# Patient Record
Sex: MALE | Race: WHITE | Employment: OTHER | URBAN - METROPOLITAN AREA
[De-identification: names, ages, dates, MRNs, and addresses within clinical notes are randomized per-mention and may not be internally consistent; named-entity substitution may affect disease eponyms.]

---

## 2017-02-06 ENCOUNTER — OFFICE VISIT (OUTPATIENT)
Dept: CARDIOLOGY CLINIC | Age: 57
End: 2017-02-06

## 2017-02-06 VITALS
HEIGHT: 68 IN | DIASTOLIC BLOOD PRESSURE: 80 MMHG | HEART RATE: 64 BPM | BODY MASS INDEX: 36.59 KG/M2 | WEIGHT: 241.4 LBS | SYSTOLIC BLOOD PRESSURE: 110 MMHG

## 2017-02-06 DIAGNOSIS — I10 ESSENTIAL HYPERTENSION: ICD-10-CM

## 2017-02-06 DIAGNOSIS — I42.9 CARDIOMYOPATHY (HCC): Primary | ICD-10-CM

## 2017-02-06 DIAGNOSIS — R55 PRE-SYNCOPE: ICD-10-CM

## 2017-02-06 PROCEDURE — 99214 OFFICE O/P EST MOD 30 MIN: CPT | Performed by: INTERNAL MEDICINE

## 2017-03-28 ENCOUNTER — OFFICE VISIT (OUTPATIENT)
Dept: CARDIOLOGY CLINIC | Age: 57
End: 2017-03-28

## 2017-03-28 VITALS
HEART RATE: 56 BPM | WEIGHT: 242 LBS | SYSTOLIC BLOOD PRESSURE: 138 MMHG | DIASTOLIC BLOOD PRESSURE: 80 MMHG | BODY MASS INDEX: 36.8 KG/M2

## 2017-03-28 DIAGNOSIS — I10 ESSENTIAL HYPERTENSION: ICD-10-CM

## 2017-03-28 DIAGNOSIS — I42.9 CARDIOMYOPATHY (HCC): ICD-10-CM

## 2017-03-28 DIAGNOSIS — R07.9 CHEST PAIN, UNSPECIFIED TYPE: Primary | ICD-10-CM

## 2017-03-28 DIAGNOSIS — R55 PRE-SYNCOPE: ICD-10-CM

## 2017-03-28 PROCEDURE — 93000 ELECTROCARDIOGRAM COMPLETE: CPT | Performed by: INTERNAL MEDICINE

## 2017-03-28 PROCEDURE — 99214 OFFICE O/P EST MOD 30 MIN: CPT | Performed by: INTERNAL MEDICINE

## 2017-04-05 ENCOUNTER — OFFICE VISIT (OUTPATIENT)
Dept: ORTHOPEDIC SURGERY | Age: 57
End: 2017-04-05

## 2017-04-05 VITALS
DIASTOLIC BLOOD PRESSURE: 77 MMHG | HEIGHT: 68 IN | BODY MASS INDEX: 36.37 KG/M2 | SYSTOLIC BLOOD PRESSURE: 140 MMHG | HEART RATE: 53 BPM | WEIGHT: 240 LBS

## 2017-04-05 DIAGNOSIS — M25.512 LEFT SHOULDER PAIN, UNSPECIFIED CHRONICITY: Primary | ICD-10-CM

## 2017-04-05 DIAGNOSIS — M19.012 PRIMARY OSTEOARTHRITIS OF LEFT SHOULDER: ICD-10-CM

## 2017-04-05 PROCEDURE — 99214 OFFICE O/P EST MOD 30 MIN: CPT | Performed by: ORTHOPAEDIC SURGERY

## 2017-04-05 PROCEDURE — 73030 X-RAY EXAM OF SHOULDER: CPT | Performed by: ORTHOPAEDIC SURGERY

## 2017-04-14 DIAGNOSIS — M19.012 PRIMARY OSTEOARTHRITIS OF LEFT SHOULDER: Primary | ICD-10-CM

## 2017-05-10 ENCOUNTER — OFFICE VISIT (OUTPATIENT)
Dept: ORTHOPEDIC SURGERY | Age: 57
End: 2017-05-10

## 2017-05-10 VITALS
DIASTOLIC BLOOD PRESSURE: 60 MMHG | WEIGHT: 230 LBS | HEIGHT: 68 IN | BODY MASS INDEX: 34.86 KG/M2 | SYSTOLIC BLOOD PRESSURE: 101 MMHG | HEART RATE: 61 BPM

## 2017-05-10 DIAGNOSIS — M19.012 PRIMARY OSTEOARTHRITIS OF LEFT SHOULDER: Primary | ICD-10-CM

## 2017-05-10 PROCEDURE — 99213 OFFICE O/P EST LOW 20 MIN: CPT | Performed by: ORTHOPAEDIC SURGERY

## 2017-05-10 PROCEDURE — L3670 SO ACRO/CLAV CAN WEB PRE OTS: HCPCS | Performed by: ORTHOPAEDIC SURGERY

## 2017-05-12 ENCOUNTER — TELEPHONE (OUTPATIENT)
Dept: ORTHOPEDIC SURGERY | Age: 57
End: 2017-05-12

## 2017-05-22 ENCOUNTER — OFFICE VISIT (OUTPATIENT)
Dept: CARDIOLOGY CLINIC | Age: 57
End: 2017-05-22

## 2017-05-22 VITALS
HEART RATE: 58 BPM | WEIGHT: 232 LBS | DIASTOLIC BLOOD PRESSURE: 68 MMHG | SYSTOLIC BLOOD PRESSURE: 116 MMHG | BODY MASS INDEX: 35.28 KG/M2

## 2017-05-22 DIAGNOSIS — I42.9 CARDIOMYOPATHY (HCC): ICD-10-CM

## 2017-05-22 DIAGNOSIS — I10 ESSENTIAL HYPERTENSION: ICD-10-CM

## 2017-05-22 DIAGNOSIS — R07.9 CHEST PAIN, UNSPECIFIED TYPE: Primary | ICD-10-CM

## 2017-05-22 PROCEDURE — 99214 OFFICE O/P EST MOD 30 MIN: CPT | Performed by: INTERNAL MEDICINE

## 2017-05-30 DIAGNOSIS — I42.9 CARDIOMYOPATHY (HCC): ICD-10-CM

## 2017-06-01 RX ORDER — CARVEDILOL 25 MG/1
25 TABLET ORAL 2 TIMES DAILY WITH MEALS
Qty: 60 TABLET | Refills: 5 | Status: SHIPPED | OUTPATIENT
Start: 2017-06-01 | End: 2017-09-07 | Stop reason: SINTOL

## 2017-07-20 ENCOUNTER — TELEPHONE (OUTPATIENT)
Dept: CARDIOLOGY CLINIC | Age: 57
End: 2017-07-20

## 2017-07-20 DIAGNOSIS — I15.9 SECONDARY HYPERTENSION: ICD-10-CM

## 2017-07-20 DIAGNOSIS — I50.32 CHRONIC DIASTOLIC HF (HEART FAILURE) (HCC): Primary | ICD-10-CM

## 2017-07-20 DIAGNOSIS — I10 ESSENTIAL HYPERTENSION: ICD-10-CM

## 2017-07-20 DIAGNOSIS — I50.22 CHRONIC SYSTOLIC HF (HEART FAILURE) (HCC): ICD-10-CM

## 2017-07-20 RX ORDER — VALSARTAN AND HYDROCHLOROTHIAZIDE 160; 12.5 MG/1; MG/1
1 TABLET, FILM COATED ORAL 2 TIMES DAILY
Qty: 60 TABLET | Refills: 1 | Status: SHIPPED | OUTPATIENT
Start: 2017-07-20 | End: 2017-09-14 | Stop reason: SDUPTHER

## 2017-07-21 DIAGNOSIS — I15.9 SECONDARY HYPERTENSION: ICD-10-CM

## 2017-07-21 DIAGNOSIS — I50.32 CHRONIC DIASTOLIC HF (HEART FAILURE) (HCC): ICD-10-CM

## 2017-07-21 DIAGNOSIS — I50.22 CHRONIC SYSTOLIC HF (HEART FAILURE) (HCC): ICD-10-CM

## 2017-07-21 LAB
ANION GAP SERPL CALCULATED.3IONS-SCNC: 17 MMOL/L (ref 3–16)
BUN BLDV-MCNC: 51 MG/DL (ref 7–20)
CALCIUM SERPL-MCNC: 9.3 MG/DL (ref 8.3–10.6)
CHLORIDE BLD-SCNC: 96 MMOL/L (ref 99–110)
CO2: 23 MMOL/L (ref 21–32)
CREAT SERPL-MCNC: 1.6 MG/DL (ref 0.9–1.3)
GFR AFRICAN AMERICAN: 54
GFR NON-AFRICAN AMERICAN: 44
GLUCOSE BLD-MCNC: 139 MG/DL (ref 70–99)
POTASSIUM SERPL-SCNC: 4.4 MMOL/L (ref 3.5–5.1)
SODIUM BLD-SCNC: 136 MMOL/L (ref 136–145)

## 2017-07-24 ENCOUNTER — OFFICE VISIT (OUTPATIENT)
Dept: CARDIOLOGY CLINIC | Age: 57
End: 2017-07-24

## 2017-07-24 ENCOUNTER — TELEPHONE (OUTPATIENT)
Dept: CARDIOLOGY CLINIC | Age: 57
End: 2017-07-24

## 2017-07-24 VITALS
WEIGHT: 238.6 LBS | BODY MASS INDEX: 36.16 KG/M2 | HEART RATE: 62 BPM | DIASTOLIC BLOOD PRESSURE: 74 MMHG | SYSTOLIC BLOOD PRESSURE: 130 MMHG | HEIGHT: 68 IN

## 2017-07-24 DIAGNOSIS — I42.9 CARDIOMYOPATHY (HCC): Primary | ICD-10-CM

## 2017-07-24 DIAGNOSIS — I10 ESSENTIAL HYPERTENSION: ICD-10-CM

## 2017-07-24 DIAGNOSIS — I50.32 CHRONIC DIASTOLIC HEART FAILURE (HCC): ICD-10-CM

## 2017-07-24 DIAGNOSIS — I50.22 CHRONIC SYSTOLIC HEART FAILURE (HCC): ICD-10-CM

## 2017-07-24 DIAGNOSIS — R07.9 CHEST PAIN, UNSPECIFIED TYPE: ICD-10-CM

## 2017-07-24 DIAGNOSIS — I10 HYPERTENSION, BENIGN: Primary | ICD-10-CM

## 2017-07-24 DIAGNOSIS — I42.9 CARDIOMYOPATHY (HCC): ICD-10-CM

## 2017-07-24 PROCEDURE — 99214 OFFICE O/P EST MOD 30 MIN: CPT | Performed by: INTERNAL MEDICINE

## 2017-07-26 ENCOUNTER — TELEPHONE (OUTPATIENT)
Dept: CARDIOLOGY CLINIC | Age: 57
End: 2017-07-26

## 2017-07-28 ENCOUNTER — TELEPHONE (OUTPATIENT)
Dept: INTERNAL MEDICINE | Age: 57
End: 2017-07-28

## 2017-08-02 ENCOUNTER — TELEPHONE (OUTPATIENT)
Dept: ORTHOPEDIC SURGERY | Age: 57
End: 2017-08-02

## 2017-08-03 ENCOUNTER — OFFICE VISIT (OUTPATIENT)
Dept: CARDIOLOGY CLINIC | Age: 57
End: 2017-08-03

## 2017-08-03 VITALS
DIASTOLIC BLOOD PRESSURE: 60 MMHG | HEART RATE: 60 BPM | SYSTOLIC BLOOD PRESSURE: 100 MMHG | WEIGHT: 240 LBS | BODY MASS INDEX: 36.49 KG/M2

## 2017-08-03 DIAGNOSIS — I42.9 CARDIOMYOPATHY, UNSPECIFIED TYPE (HCC): Primary | ICD-10-CM

## 2017-08-03 DIAGNOSIS — I50.32 CHRONIC DIASTOLIC HEART FAILURE (HCC): ICD-10-CM

## 2017-08-03 DIAGNOSIS — I42.9 CARDIOMYOPATHY, UNSPECIFIED TYPE (HCC): ICD-10-CM

## 2017-08-03 DIAGNOSIS — I50.22 CHRONIC SYSTOLIC HEART FAILURE (HCC): ICD-10-CM

## 2017-08-03 DIAGNOSIS — I10 HYPERTENSION, BENIGN: ICD-10-CM

## 2017-08-03 DIAGNOSIS — I42.9 CARDIOMYOPATHY (HCC): ICD-10-CM

## 2017-08-03 LAB
ANION GAP SERPL CALCULATED.3IONS-SCNC: 15 MMOL/L (ref 3–16)
BUN BLDV-MCNC: 33 MG/DL (ref 7–20)
CALCIUM SERPL-MCNC: 9.2 MG/DL (ref 8.3–10.6)
CHLORIDE BLD-SCNC: 100 MMOL/L (ref 99–110)
CO2: 24 MMOL/L (ref 21–32)
CREAT SERPL-MCNC: 1.4 MG/DL (ref 0.9–1.3)
GFR AFRICAN AMERICAN: >60
GFR NON-AFRICAN AMERICAN: 52
GLUCOSE BLD-MCNC: 106 MG/DL (ref 70–99)
POTASSIUM SERPL-SCNC: 4.2 MMOL/L (ref 3.5–5.1)
SODIUM BLD-SCNC: 139 MMOL/L (ref 136–145)

## 2017-08-03 PROCEDURE — 93000 ELECTROCARDIOGRAM COMPLETE: CPT | Performed by: INTERNAL MEDICINE

## 2017-08-03 PROCEDURE — 99215 OFFICE O/P EST HI 40 MIN: CPT | Performed by: INTERNAL MEDICINE

## 2017-08-03 RX ORDER — ASPIRIN 81 MG/1
81 TABLET ORAL DAILY
Qty: 30 TABLET | Refills: 3 | Status: ON HOLD | OUTPATIENT
Start: 2017-08-03 | End: 2017-10-27 | Stop reason: HOSPADM

## 2017-08-03 RX ORDER — ATORVASTATIN CALCIUM 20 MG/1
20 TABLET, FILM COATED ORAL DAILY
Qty: 30 TABLET | Refills: 3 | Status: SHIPPED | OUTPATIENT
Start: 2017-08-03 | End: 2017-08-15

## 2017-08-04 ENCOUNTER — TELEPHONE (OUTPATIENT)
Dept: ORTHOPEDIC SURGERY | Age: 57
End: 2017-08-04

## 2017-08-15 ENCOUNTER — OFFICE VISIT (OUTPATIENT)
Dept: INTERNAL MEDICINE | Age: 57
End: 2017-08-15

## 2017-08-15 VITALS — BODY MASS INDEX: 36.04 KG/M2 | SYSTOLIC BLOOD PRESSURE: 126 MMHG | WEIGHT: 237 LBS | DIASTOLIC BLOOD PRESSURE: 70 MMHG

## 2017-08-15 DIAGNOSIS — I10 HYPERTENSION, BENIGN: Primary | ICD-10-CM

## 2017-08-15 DIAGNOSIS — I50.22 CHRONIC SYSTOLIC HEART FAILURE (HCC): ICD-10-CM

## 2017-08-15 DIAGNOSIS — Z12.5 PROSTATE CANCER SCREENING: ICD-10-CM

## 2017-08-15 DIAGNOSIS — Z11.4 SCREENING FOR HIV (HUMAN IMMUNODEFICIENCY VIRUS): ICD-10-CM

## 2017-08-15 DIAGNOSIS — N52.9 ERECTILE DYSFUNCTION, UNSPECIFIED ERECTILE DYSFUNCTION TYPE: ICD-10-CM

## 2017-08-15 DIAGNOSIS — E66.8 MODERATE OBESITY: ICD-10-CM

## 2017-08-15 DIAGNOSIS — N18.3 CKD (CHRONIC KIDNEY DISEASE), STAGE 3 (MODERATE): ICD-10-CM

## 2017-08-15 DIAGNOSIS — Z23 NEED FOR PNEUMOCOCCAL VACCINATION: ICD-10-CM

## 2017-08-15 DIAGNOSIS — Z11.59 NEED FOR HEPATITIS C SCREENING TEST: ICD-10-CM

## 2017-08-15 DIAGNOSIS — Z13.1 DIABETES MELLITUS SCREENING: ICD-10-CM

## 2017-08-15 LAB
BASOPHILS ABSOLUTE: 0.1 K/UL (ref 0–0.2)
BASOPHILS RELATIVE PERCENT: 0.9 %
CHOLESTEROL, TOTAL: 200 MG/DL (ref 0–199)
EOSINOPHILS ABSOLUTE: 0.5 K/UL (ref 0–0.6)
EOSINOPHILS RELATIVE PERCENT: 5.7 %
HCT VFR BLD CALC: 40.7 % (ref 40.5–52.5)
HDLC SERPL-MCNC: 33 MG/DL (ref 40–60)
HEMOGLOBIN: 14.2 G/DL (ref 13.5–17.5)
HEPATITIS C ANTIBODY INTERPRETATION: NORMAL
LDL CHOLESTEROL CALCULATED: 119 MG/DL
LYMPHOCYTES ABSOLUTE: 1.8 K/UL (ref 1–5.1)
LYMPHOCYTES RELATIVE PERCENT: 19.3 %
MCH RBC QN AUTO: 30.6 PG (ref 26–34)
MCHC RBC AUTO-ENTMCNC: 34.9 G/DL (ref 31–36)
MCV RBC AUTO: 87.8 FL (ref 80–100)
MONOCYTES ABSOLUTE: 0.6 K/UL (ref 0–1.3)
MONOCYTES RELATIVE PERCENT: 6.8 %
NEUTROPHILS ABSOLUTE: 6.1 K/UL (ref 1.7–7.7)
NEUTROPHILS RELATIVE PERCENT: 67.3 %
PDW BLD-RTO: 13.6 % (ref 12.4–15.4)
PLATELET # BLD: 135 K/UL (ref 135–450)
PMV BLD AUTO: 8.7 FL (ref 5–10.5)
PROSTATE SPECIFIC ANTIGEN: 0.36 NG/ML (ref 0–4)
RBC # BLD: 4.64 M/UL (ref 4.2–5.9)
TRIGL SERPL-MCNC: 241 MG/DL (ref 0–150)
TSH SERPL DL<=0.05 MIU/L-ACNC: 0.85 UIU/ML (ref 0.27–4.2)
VLDLC SERPL CALC-MCNC: 48 MG/DL
WBC # BLD: 9.1 K/UL (ref 4–11)

## 2017-08-15 PROCEDURE — 99214 OFFICE O/P EST MOD 30 MIN: CPT | Performed by: HOSPITALIST

## 2017-08-15 PROCEDURE — 90732 PPSV23 VACC 2 YRS+ SUBQ/IM: CPT | Performed by: HOSPITALIST

## 2017-08-15 PROCEDURE — 90471 IMMUNIZATION ADMIN: CPT | Performed by: HOSPITALIST

## 2017-08-15 RX ORDER — VITAMIN E 268 MG
400 CAPSULE ORAL DAILY
COMMUNITY

## 2017-08-15 RX ORDER — VITAMIN B COMPLEX
4 TABLET ORAL
COMMUNITY

## 2017-08-15 ASSESSMENT — PATIENT HEALTH QUESTIONNAIRE - PHQ9
2. FEELING DOWN, DEPRESSED OR HOPELESS: 1
SUM OF ALL RESPONSES TO PHQ9 QUESTIONS 1 & 2: 2
1. LITTLE INTEREST OR PLEASURE IN DOING THINGS: 1
SUM OF ALL RESPONSES TO PHQ QUESTIONS 1-9: 2

## 2017-08-16 ENCOUNTER — HOSPITAL ENCOUNTER (OUTPATIENT)
Dept: NUCLEAR MEDICINE | Age: 57
Discharge: HOME | End: 2017-08-16
Attending: INTERNAL MEDICINE | Admitting: INTERNAL MEDICINE

## 2017-08-16 DIAGNOSIS — I42.9 CARDIOMYOPATHY, UNSPECIFIED TYPE (HCC): ICD-10-CM

## 2017-08-16 DIAGNOSIS — I42.9 CARDIOMYOPATHY (HCC): ICD-10-CM

## 2017-08-16 LAB
ESTIMATED AVERAGE GLUCOSE: 131.2 MG/DL
HBA1C MFR BLD: 6.2 %
HIV-1 AND HIV-2 ANTIBODIES: NORMAL
LV EF: 47 %
LVEF MODALITY: NORMAL

## 2017-08-16 RX ADMIN — Medication 30 MILLICURIE: at 12:55

## 2017-08-31 ENCOUNTER — TELEPHONE (OUTPATIENT)
Dept: CARDIOLOGY CLINIC | Age: 57
End: 2017-08-31

## 2017-09-01 ENCOUNTER — TELEPHONE (OUTPATIENT)
Dept: INTERNAL MEDICINE | Age: 57
End: 2017-09-01

## 2017-09-07 RX ORDER — METOPROLOL TARTRATE 50 MG/1
50 TABLET, FILM COATED ORAL 2 TIMES DAILY
Qty: 60 TABLET | Refills: 3 | Status: SHIPPED | OUTPATIENT
Start: 2017-09-07 | End: 2018-02-19 | Stop reason: SDUPTHER

## 2017-09-14 DIAGNOSIS — I10 ESSENTIAL HYPERTENSION: ICD-10-CM

## 2017-09-15 RX ORDER — VALSARTAN AND HYDROCHLOROTHIAZIDE 160; 12.5 MG/1; MG/1
TABLET, FILM COATED ORAL
Qty: 60 TABLET | Refills: 6 | Status: SHIPPED | OUTPATIENT
Start: 2017-09-15 | End: 2018-02-20 | Stop reason: ALTCHOICE

## 2017-10-03 ENCOUNTER — TELEPHONE (OUTPATIENT)
Dept: ORTHOPEDIC SURGERY | Age: 57
End: 2017-10-03

## 2017-10-09 RX ORDER — SODIUM CHLORIDE, SODIUM LACTATE, POTASSIUM CHLORIDE, CALCIUM CHLORIDE 600; 310; 30; 20 MG/100ML; MG/100ML; MG/100ML; MG/100ML
INJECTION, SOLUTION INTRAVENOUS CONTINUOUS
Status: CANCELLED | OUTPATIENT
Start: 2017-10-09

## 2017-10-10 ENCOUNTER — HOSPITAL ENCOUNTER (OUTPATIENT)
Dept: PREADMISSION TESTING | Age: 57
Discharge: HOME | End: 2017-10-10
Attending: ORTHOPAEDIC SURGERY | Admitting: ORTHOPAEDIC SURGERY

## 2017-10-10 VITALS
TEMPERATURE: 98.3 F | RESPIRATION RATE: 16 BRPM | DIASTOLIC BLOOD PRESSURE: 70 MMHG | WEIGHT: 240 LBS | HEIGHT: 68 IN | HEART RATE: 50 BPM | BODY MASS INDEX: 36.37 KG/M2 | SYSTOLIC BLOOD PRESSURE: 126 MMHG | OXYGEN SATURATION: 99 %

## 2017-10-10 LAB
ABO/RH: NORMAL
ANION GAP SERPL CALCULATED.3IONS-SCNC: 13 MMOL/L (ref 3–16)
ANTIBODY SCREEN: NORMAL
APTT: 24.1 SEC (ref 24.1–34.9)
BASOPHILS ABSOLUTE: 0.1 K/UL (ref 0–0.2)
BASOPHILS RELATIVE PERCENT: 1 %
BILIRUBIN URINE: NEGATIVE
BLOOD, URINE: NEGATIVE
BUN BLDV-MCNC: 24 MG/DL (ref 7–20)
CALCIUM SERPL-MCNC: 9.3 MG/DL (ref 8.3–10.6)
CHLORIDE BLD-SCNC: 100 MMOL/L (ref 99–110)
CLARITY: CLEAR
CO2: 24 MMOL/L (ref 21–32)
COLOR: YELLOW
CREAT SERPL-MCNC: 1 MG/DL (ref 0.9–1.3)
EKG ATRIAL RATE: 47 BPM
EKG DIAGNOSIS: NORMAL
EKG P AXIS: 40 DEGREES
EKG P-R INTERVAL: 194 MS
EKG Q-T INTERVAL: 454 MS
EKG QRS DURATION: 96 MS
EKG QTC CALCULATION (BAZETT): 401 MS
EKG R AXIS: 28 DEGREES
EKG T AXIS: 1 DEGREES
EKG VENTRICULAR RATE: 47 BPM
EOSINOPHILS ABSOLUTE: 0.4 K/UL (ref 0–0.6)
EOSINOPHILS RELATIVE PERCENT: 4.5 %
GFR AFRICAN AMERICAN: >60
GFR NON-AFRICAN AMERICAN: >60
GLUCOSE BLD-MCNC: 105 MG/DL (ref 70–99)
GLUCOSE URINE: NEGATIVE MG/DL
HCT VFR BLD CALC: 40.7 % (ref 40.5–52.5)
HEMOGLOBIN: 14.3 G/DL (ref 13.5–17.5)
INR BLD: 0.98 (ref 0.85–1.15)
KETONES, URINE: NEGATIVE MG/DL
LEUKOCYTE ESTERASE, URINE: NEGATIVE
LYMPHOCYTES ABSOLUTE: 2 K/UL (ref 1–5.1)
LYMPHOCYTES RELATIVE PERCENT: 20.4 %
MCH RBC QN AUTO: 31.1 PG (ref 26–34)
MCHC RBC AUTO-ENTMCNC: 35.2 G/DL (ref 31–36)
MCV RBC AUTO: 88.5 FL (ref 80–100)
MICROSCOPIC EXAMINATION: NORMAL
MONOCYTES ABSOLUTE: 0.6 K/UL (ref 0–1.3)
MONOCYTES RELATIVE PERCENT: 6.8 %
NEUTROPHILS ABSOLUTE: 6.4 K/UL (ref 1.7–7.7)
NEUTROPHILS RELATIVE PERCENT: 67.3 %
NITRITE, URINE: NEGATIVE
PDW BLD-RTO: 13.3 % (ref 12.4–15.4)
PH UA: 5.5
PLATELET # BLD: 167 K/UL (ref 135–450)
PMV BLD AUTO: 8.3 FL (ref 5–10.5)
POTASSIUM SERPL-SCNC: 4.3 MMOL/L (ref 3.5–5.1)
PROTEIN UA: NEGATIVE MG/DL
PROTHROMBIN TIME: 11.1 SEC (ref 9.6–13)
RBC # BLD: 4.6 M/UL (ref 4.2–5.9)
SODIUM BLD-SCNC: 137 MMOL/L (ref 136–145)
SPECIFIC GRAVITY UA: 1.02
URINE TYPE: NORMAL
UROBILINOGEN, URINE: 0.2 E.U./DL
WBC # BLD: 9.6 K/UL (ref 4–11)

## 2017-10-10 NOTE — PROGRESS NOTES
Snoring? Do you snore loudly (loud enough to be heard through closed doors, or your bed partner elbows you for snoring at night)? Yes    Tired? Do you often feel tired, fatigued, or sleepy during the daytime (such as falling asleep during driving)? Yes    Observed? Has anyone observed you stop breathing or choking/gasping during your sleep? Yes    Pressure? Do you have or are being treated for high blood pressure? Yes    Neck Size? (measured around Conrados apple)  For male, is your shirt collar 17 inches or larger? For female, is your shirt collar 16 inches or larger? No    Age older than 48years old? Yes    Gender = Male  Yes    Body Mass Index more than 35 kg/m2?   Yes    Risk of VERN Scoring criteria:    [] Low risk:  Yes to 0  2 questions    [] Intermediate risk:  Yes to 3  4 questions    [x] High risk:  Yes to 5  8 questions

## 2017-10-10 NOTE — PROGRESS NOTES
(exception would be medication instructions below only)     5. MEDICATIONS    Take the following medications with a SMALL sip of water: Metoprolol   Use your usual dose of inhalers the morning of surgery. BRING your rescue inhaler with you to hospital.    Anesthesia does NOT want you to take insulin the morning of surgery. They will control your blood sugar while you are at the hospital. Please contact your ordering physician for instructions regarding your insulin the night before your procedure. If you have an insulin pump, please keep it set on basal rate. 6. Do not swallow water when brushing teeth. No gum, candy, mints or ice chips. Refrain from smoking or at least decrease the amount. 7. Dress in loose, comfortable clothing appropriate for redressing after your procedure. Do not wear jewelry (including body piercings), make-up (especially NO eye make-up), fingernail polish (NO toenail polish if foot/leg surgery), lotion, powders or metal hairclips. 8. Dentures, glasses, or contacts will need to be removed before your procedure. Bring cases for your glasses, contacts, dentures, or hearing aids to protect them while you are in surgery. 9. If you use a CPAP, please bring it with you on the day of your procedure. 10. We recommend that valuable personal  belongings, such as credit cards, cash, cell phones, e-tablets or jewelry, be left at home during your stay. The hospital will not be responsible for valuables that are not secured in the hospital safe. However, if your insurance requires a co-pay, you may want to bring a method of payment, i.e. check, cash, or credit card, if you wish to pay your co-pay the day of surgery. 11. If you are to stay overnight, you may bring a bag with personal items. Please have any large items you may need brought in by your family after your arrival to your hospital room.     12. If you have a Living Will or Durable Power of , please bring a copy on the day of your procedure. 15.  With your permission, one family member may accompany you while you are being prepared for surgery. Once you are ready, additional family members may join you. HOW WE KEEP YOU SAFE and WORK TO PREVENT SURGICAL SITE INFECTIONS:  1. Health care workers should always check your ID bracelet to verify your name and birth date. You will be asked many times to state your name, date of birth, and allergies. 2. Health care workers should always clean their hands with soap or alcohol gel before providing care to you. It is okay to ask anyone if they cleaned their hands before they touch you. 3. You will be actively involved in verifying the type of procedure you are having and ensuring the correct surgical site. This will be confirmed multiple times prior to your procedure. Do NOT mimi your surgery site UNLESS instructed to by your surgeon. 4. Do not shave or wax for 72 hours prior to procedure near your operative site. Shaving with a razor can irritate your skin and make it easier to develop an infection. On the day of your procedure, any hair that needs to be removed near the surgical site will be clipped by a healthcare worker using a special clippers designed to avoid skin irritation. 5. When you are in the operating room, your surgical site will be cleansed with a special soap, and in most cases, you will be given an antibiotic before the surgery begins. AFTER YOUR PROCEDURE:  1. For comfort and safety, arrange to have someone at home with you for the first 24 hours after discharge. 2. You and your family will be given written instructions about your diet, activity, dressing care, medications, and return visits. 3. Always clean your hands before and after caring for your wound. Do not let your family touch your surgery site without cleaning their hands. 4. Mild nausea, headache, muscle aches, sore throat, or fatigue may occur after anesthesia.  Should

## 2017-10-10 NOTE — PROGRESS NOTES
The following educational items and goals will be achieved upon completion of the patient's Pre-admission testing experience:             Identify the learner who is being assessed for education:  patient                    Ability to Learn:  Exhibits ability to grasp concepts and respond to questions: High  Ready to Learn: Yes  calm   Preferred Method of Learning:  verbal  Barriers to Learning: Verbalizes interest  Special Considerations due to cultural, Yazidism, spiritual beliefs:  No  Language:  English  :  Ciera Lind  [x] Appropriate evaluation / integration of data as delineated by ASPAN Standards of Perianesthesia Nursing Practice    Pain scale and pain management   [x]Patient verbalizes understanding of pain scale and pain management  [x]Pre-operative determination of patients anticipated Post-Operative pain goal:   4 of 10 on 10 point scale post op goal  [] Other     Medication(s) - Compliance with preop medication instructions  [x] Patient verbalizes understanding of preop medications (see [facility] Presurgical Instructions)    Instructions, Pre op                                                                                            [x] Patient verbalizes understanding of presurgical instructions as reviewed with phone interview nurse or in-person nurse review    Fall Risk Potential, Preoperatively                                                                                   [x]No preoperative risk identified  []Preop risk identified:                    []Sensory deficit        []Motor deficit        []Balance problem        []Home medication        []Uses assistive device                    []History of a Fall within the last 30 days    Goal(s) for fall prevention:  [x]Prevent fall or injury by requesting assistance with activities of daily living  [x]Patient / Significant other verbalizes understanding the need to call for assistance prior to getting out of bed during hospitalization      Infection Precautions                                                                                            [x] Patient understands implementation of Surgical Site Infection precautions (see [facility] Presurgical Instructions)     Patient Safety  [x] Patient identification verified  [x] Site verified    Instructions - Discharge Planning for Outpatients  [x] Patient / significant other voices understanding of home care and follow up procedures  [x] Encourage patient / significant other to review discharge instructions the day after procedure due to sedation on day of surgery    Anticipated Special Needs upon discharge:        [] Cooling device        [] Crutches       [] Catherne         [] Wound Support device        [] Drain        [] Other       Instructions - Discharge Planning for Admitted patients  [x] Patient / significant other understands plan for admission after surgery  [] Patient / significant other understands plan for anticipated discharge dispostion        10/10/2017 1:16 PM Denise Fontenot

## 2017-10-11 ENCOUNTER — OFFICE VISIT (OUTPATIENT)
Dept: CARDIOLOGY CLINIC | Age: 57
End: 2017-10-11

## 2017-10-11 ENCOUNTER — TELEPHONE (OUTPATIENT)
Dept: CARDIOLOGY CLINIC | Age: 57
End: 2017-10-11

## 2017-10-11 VITALS
HEIGHT: 68 IN | SYSTOLIC BLOOD PRESSURE: 120 MMHG | BODY MASS INDEX: 36.37 KG/M2 | WEIGHT: 240 LBS | RESPIRATION RATE: 12 BRPM | HEART RATE: 55 BPM | DIASTOLIC BLOOD PRESSURE: 72 MMHG | OXYGEN SATURATION: 98 %

## 2017-10-11 DIAGNOSIS — R07.9 CHEST PAIN, UNSPECIFIED TYPE: ICD-10-CM

## 2017-10-11 DIAGNOSIS — I42.0 DILATED CARDIOMYOPATHY (HCC): ICD-10-CM

## 2017-10-11 DIAGNOSIS — I10 ESSENTIAL HYPERTENSION: ICD-10-CM

## 2017-10-11 DIAGNOSIS — Z01.818 PRE-OP EVALUATION: Primary | ICD-10-CM

## 2017-10-11 LAB
ESTIMATED AVERAGE GLUCOSE: 137 MG/DL
HBA1C MFR BLD: 6.4 %
MRSA SCREEN RT-PCR: NORMAL
URINE CULTURE, ROUTINE: NORMAL

## 2017-10-11 PROCEDURE — 99214 OFFICE O/P EST MOD 30 MIN: CPT | Performed by: INTERNAL MEDICINE

## 2017-10-11 NOTE — PROGRESS NOTES
Subjective:      Patient ID: Nataliia Tee is a 59 y.o. male. HPI: Pt is being seen today for follow up on cardiomyopathy/HTN/pre syncope and pre op for shoulder surgery. Sx stable. No sob. No chest.   No pnd or orthopnea. No edema. No palp/tachycardia. No syncope. Past Medical History:   Diagnosis Date    Arthritis     [facility]estive heart failure) ([facility] [address]     Hypertension, benign     Kidney stone     Lipoma     removal righy shoulder    Other drug allergy     Psoriasis     pt states it is worse since being on Metoprolol    Thyroid disease     enlarged    Unspecified sleep apnea     no CPAP. Has not done a sleep study     Past Surgical History:   Procedure Laterality Date    HERNIA REPAIR      right     LIPOMA RESECTION      TONSILLECTOMY           Allergies   Allergen Reactions    Doxycycline Dermatitis    Iodides     Ketorolac Tromethamine Swelling    Oxycodone-Acetaminophen Hives    Percocet [Oxycodone-Acetaminophen]      Skin falls off hands    Clindamycin/Lincomycin Rash     \"sunburn rash\" and impending feeling of gloom        Social History     Social History    Marital status:      Spouse name: N/A    Number of children: N/A    Years of education: N/A     Occupational History    Not on file. Social History Main Topics    Smoking status: Never Smoker    Smokeless tobacco: Never Used    Alcohol use Yes      Comment: occ    Drug use: No    Sexual activity: Not on file     Other Topics Concern    Not on file     Social History Narrative    No narrative on file        Patient has a family history includes Other in his father and mother. Patient  has a past medical history of Arthritis; CHF (congestive heart failure) ([facility] [address] Hypertension, benign; Kidney stone; Lipoma; Other drug allergy; Psoriasis; Thyroid disease; and Unspecified sleep apnea.      Current Outpatient Prescriptions   Medication Sig Dispense Refill    Zinc Sulfate (ZINC 15 PO) Take by mouth  valsartan-hydrochlorothiazide (DIOVAN-HCT) 160-12.5 MG per tablet TAKE 1 TABLET BY MOUTH TWICE DAILY 60 tablet 6    metoprolol (LOPRESSOR) 50 MG tablet Take 1 tablet by mouth 2 times daily 60 tablet 3    vitamin E 400 UNIT capsule Take 400 Units by mouth daily      Coenzyme Q10 (COQ10) 100 MG CAPS Take 4 tablets by mouth       aspirin EC 81 MG EC tablet Take 1 tablet by mouth daily 30 tablet 3    Multiple Vitamins-Minerals (THERAPEUTIC MULTIVITAMIN-MINERALS) tablet Take 1 tablet by mouth daily      Glucosamine-Chondroit-Vit C-Mn (GLUCOSAMINE 1500 COMPLEX PO) Take by mouth      Omega-3 Fatty Acids (FISH OIL) 1200 MG CAPS Take by mouth 3 tablets once a week      B Complex Vitamins (VITAMIN-B COMPLEX) TABS Take by mouth      magnesium citrate solution Take 296 mLs by mouth once      vitamin D (CHOLECALCIFEROL) 400 UNIT TABS tablet Take 400 Units by mouth 2 times daily. No current facility-administered medications for this visit. Vitals  Weight: 240 lb (108.9 kg)  Blood Pressure: BP: (120)/(72)    Pulse: 55            Review of Systems   Constitutional: Negative for activity change and fatigue. Respiratory: Negative for apnea, cough, choking, chest tightness and shortness of breath. Cardiovascular: Negative for chest pain. Negative for palpitations and leg swelling. No PND or orthopnea. No tachycardia. Gastrointestinal: Negative for abdominal distention. Musculoskeletal: Negative for myalgias. Neurological: Negative for dizziness, syncope and light-headedness. Psychiatric/Behavioral: Negative for agitation, behavioral problems and confusion. Otherwise negative as done. Objective:   Physical Exam   Constitutional: He is oriented to person, place, and time. He appears well-developed and well-nourished. No distress. HENT:   Head: Normocephalic and atraumatic. Eyes: Conjunctivae and EOM are normal. Right eye exhibits no discharge. Left eye exhibits no discharge. Neck: Normal range of motion. Neck supple. No JVD present. Cardiovascular: Normal rate, regular rhythm, S1 normal, S2 normal and normal heart sounds. Exam reveals no gallop. No murmur heard. Pulses:       Radial pulses are 2+ on the right side, and 2+ on the left side. Pulmonary/Chest: Effort normal and breath sounds normal. No respiratory distress. He has no wheezes. He has no rales. Abdominal: Soft. Bowel sounds are normal. There is no tenderness. Musculoskeletal: Normal range of motion. He exhibits no edema. Neurological: He is alert and oriented to person, place, and time. Skin: Skin is warm and dry. Psychiatric: He has a normal mood and affect. His behavior is normal. Thought content normal.       Assessment:      1. Pre-op evaluation     2. Dilated cardiomyopathy ([address]     3. Essential hypertension     4. Chest pain, unspecified type             Plan:      CV stable. Compensated. No exertional sx . EKG shows SB otherwise normal. He is at increased but not prohibitive risk for shoulder surgery. Reviewed previous records and testing including myoview,echo 10/16 and cath 8/16. MUGA 8/17 shows EF 47%. Follow up 3 months.

## 2017-10-17 ENCOUNTER — OFFICE VISIT (OUTPATIENT)
Dept: INTERNAL MEDICINE | Age: 57
End: 2017-10-17

## 2017-10-17 VITALS
BODY MASS INDEX: 36.83 KG/M2 | HEIGHT: 68 IN | SYSTOLIC BLOOD PRESSURE: 118 MMHG | DIASTOLIC BLOOD PRESSURE: 70 MMHG | WEIGHT: 243 LBS

## 2017-10-17 DIAGNOSIS — I10 HYPERTENSION, BENIGN: ICD-10-CM

## 2017-10-17 DIAGNOSIS — E66.8 MODERATE OBESITY: ICD-10-CM

## 2017-10-17 DIAGNOSIS — I50.22 CHRONIC SYSTOLIC HEART FAILURE (HCC): ICD-10-CM

## 2017-10-17 DIAGNOSIS — G89.29 CHRONIC LEFT SHOULDER PAIN: ICD-10-CM

## 2017-10-17 DIAGNOSIS — Z12.11 COLON CANCER SCREENING: ICD-10-CM

## 2017-10-17 DIAGNOSIS — M25.512 CHRONIC LEFT SHOULDER PAIN: ICD-10-CM

## 2017-10-17 DIAGNOSIS — R73.03 PRE-DIABETES: ICD-10-CM

## 2017-10-17 DIAGNOSIS — Z01.818 PRE-OP EXAM: Primary | ICD-10-CM

## 2017-10-17 DIAGNOSIS — Z23 NEED FOR TDAP VACCINATION: ICD-10-CM

## 2017-10-17 PROCEDURE — 90471 IMMUNIZATION ADMIN: CPT | Performed by: HOSPITALIST

## 2017-10-17 PROCEDURE — 99214 OFFICE O/P EST MOD 30 MIN: CPT | Performed by: HOSPITALIST

## 2017-10-17 PROCEDURE — 90715 TDAP VACCINE 7 YRS/> IM: CPT | Performed by: HOSPITALIST

## 2017-10-17 ASSESSMENT — ENCOUNTER SYMPTOMS
NAUSEA: 1
EYES NEGATIVE: 1
COUGH: 1
HEARTBURN: 1

## 2017-10-17 NOTE — PROGRESS NOTES
Pre-Op Examination    Patient:  Zenon Shaw                                               : 1960  Age: 59 y.o. MRN: [id number]  Date : 10/17/2017    Referring Physician: Chino Maciel MD, orthopedic surgery    Procedure: Left shoulder surgery scheduled for 2017    HISTORY OF PRESENT ILLNESS:   The patient is a 59 y.o. male who presents for preoperative examination. Has long standing problem with Left shoulder joint; injured his shoulder about 12 years ago. Was evaluated by Dr Alecia Baugh; had several intra-articular corticosteroid injection with minimal symptom relief. Had MRI of the L shoulder on 17:  CONCLUSION:    1. Advanced osteoarthrosis of the glenohumeral joint with near circumferential tear of the    glenoid labrum, extensive spurring and remodeling. Posterior sagging of the humeral head. 2. A few, smaller 0.4cm in diameter free osteochondral bodies in the posterior joint space. 3. Mild tendinopathy of the conjoined tendon and teres minor. Undersurface fraying and    microtear of the subscapularis footprint. 4. Mild peritendinitis and mild tendinopathy of the biceps long head tendon. 5. Moderate hypertrophic arthrosis of the acromioclavicular joint. Reports 3-4/10 left shoulder pain. Planned anesthesia:  general  Known anesthesia problems: no  Bleeding risk:  no  Personal or FH of DVT/PE:  no  Patient objection to receiving blood products: no    HTN- controlled with current medfs    Cardiomyopathy- compensated; adheres to low sodium diet.     MUGA scan from 17:  Ejection fraction calculated at slightly below normal at 47 %    CKD stage 3 has improved; most recent Cr- 1.0    Pre-diabetes- most recent Hb A1c from 10/10/17- 6.4%    Past Medical History:        Diagnosis Date    Arthritis     CHF (congestive heart failure) ([address]     Hypertension, benign     Kidney stone     Lipoma     removal righy shoulder    Other drug allergy(995.27)     Psoriasis pt states it is worse since being on Metoprolol    Thyroid disease     enlarged    Unspecified sleep apnea     no CPAP. Has not done a sleep study       Past Surgical History:        Procedure Laterality Date    HERNIA REPAIR      right     LIPOMA RESECTION      TONSILLECTOMY         Family History:       Problem Relation Age of Onset    Other Mother      DJD    Other Father      idiopathic cardiomyopathy       Social History:   TOBACCO:   reports that he has never smoked. He has never used smokeless tobacco.  ETOH:   reports that he drinks alcohol. OCCUPATION:      Allergies:  Doxycycline; Iodides; Ketorolac tromethamine; Oxycodone-acetaminophen; Percocet [oxycodone-acetaminophen]; and Clindamycin/lincomycin    Current Medications:    Prior to Admission medications    Medication Sig Start Date End Date Taking? Authorizing Provider   Zinc Sulfate (ZINC 15 PO) Take by mouth   Yes Historical Provider, MD   valsartan-hydrochlorothiazide (DIOVAN-HCT) 160-12.5 MG per tablet TAKE 1 TABLET BY MOUTH TWICE DAILY 9/15/17  Yes Abraham Ramos MD   metoprolol (LOPRESSOR) 50 MG tablet Take 1 tablet by mouth 2 times daily 9/7/17  Yes Abraham Ramos MD   vitamin E 400 UNIT capsule Take 400 Units by mouth daily   Yes Historical Provider, MD   Coenzyme Q10 (COQ10) 100 MG CAPS Take 4 tablets by mouth    Yes Historical Provider, MD   Multiple Vitamins-Minerals (THERAPEUTIC MULTIVITAMIN-MINERALS) tablet Take 1 tablet by mouth daily   Yes Historical Provider, MD   Glucosamine-Chondroit-Vit C-Mn (GLUCOSAMINE 1500 COMPLEX PO) Take by mouth   Yes Historical Provider, MD   Omega-3 Fatty Acids (FISH OIL) 1200 MG CAPS Take by mouth 3 tablets once a week   Yes Historical Provider, MD   B Complex Vitamins (VITAMIN-B COMPLEX) TABS Take by mouth   Yes Historical Provider, MD   vitamin D (CHOLECALCIFEROL) 400 UNIT TABS tablet Take 400 Units by mouth 2 times daily.  8/18/10  Yes Historical Provider, MD   aspirin EC 81 MG EC tablet Take 1 He exhibits no distension and no mass. There is no tenderness. There is no rebound and no guarding. No hernia. Genitourinary: Penis normal. No penile tenderness. Genitourinary Comments: No testicular mass/tenderness to palpation   Musculoskeletal: He exhibits tenderness (Left shoulder). He exhibits no edema or deformity. Mildly reduced range of motion in the left shoulder joint   Lymphadenopathy:     He has no cervical adenopathy. Neurological: He is alert and oriented to person, place, and time. He displays normal reflexes. No sensory deficit. He exhibits normal muscle tone. Coordination normal.   Skin: Skin is warm. Capillary refill takes less than 2 seconds. No rash noted. He is not diaphoretic. No erythema. Psychiatric: He has a normal mood and affect. Thought content normal.   Vitals reviewed. EKG: Sinus mik; no acute ischemic changes     Assessment/Plan:   Misti Lopez was seen today for pre-op exam.    Diagnoses and all orders for this visit:    Pre-op exam    Hypertension, benign  - Low-sodium diet; regular judicious cardiovascular type exercise activity  - Continue current medications    Chronic systolic heart failure ([address]  - compensated; left ventricular ejection fraction has improved in the last 15 months  - continue current meds    Chronic left shoulder pain  - Tylenol as needed  - Will have surgery soon    Pre-diabetes  - Encouraged to adhere to carb controlled diet, continue regular cardiovascular type exercise program, and lose some weight    Moderate obesity  - as above    Colon cancer screening  -     POCT Fecal Immunochemical Test (FIT); Future    Need for Tdap vaccination  -     Tdap (age 10y-63y) IM ([facility])            Known risk factors for perioperative complications: no  No contraindications to planned surgery    1. Preoperative workup as follows: none idicated  2. Change in medication regimen before surgery: no  3.  Prophylaxis for cardiac events with perioperative beta-blockers:  Continue metoprolol 50 mg twice a day  4. Deep vein thrombosis prophylaxis:  Per orthopedic surgery.   Encouraged early ambulation    This form will be routed to Dr. Cherise Mireles M.D.   10/17/2017, 2:07 PM

## 2017-10-23 ENCOUNTER — TELEPHONE (OUTPATIENT)
Dept: INTERNAL MEDICINE | Age: 57
End: 2017-10-23

## 2017-10-23 NOTE — TELEPHONE ENCOUNTER
Call returned to patient's wife. Will need an appointment or see a picture.     Dermatologist will not see patient in hospital.

## 2017-10-25 ENCOUNTER — OFFICE VISIT (OUTPATIENT)
Dept: ORTHOPEDIC SURGERY | Age: 57
End: 2017-10-25

## 2017-10-25 VITALS
SYSTOLIC BLOOD PRESSURE: 124 MMHG | WEIGHT: 242.95 LBS | DIASTOLIC BLOOD PRESSURE: 73 MMHG | HEIGHT: 68 IN | BODY MASS INDEX: 36.82 KG/M2 | HEART RATE: 62 BPM

## 2017-10-25 DIAGNOSIS — M19.012 PRIMARY OSTEOARTHRITIS OF LEFT SHOULDER: Primary | ICD-10-CM

## 2017-10-25 PROCEDURE — 99213 OFFICE O/P EST LOW 20 MIN: CPT | Performed by: ORTHOPAEDIC SURGERY

## 2017-10-26 NOTE — PROGRESS NOTES
History of Present Illness:  [address] for preoperative teaching. He has advanced left shoulder primary osteoarthritis. I saw him recently back in May and we elected to proceed with shoulder replacement surgery later on in the fall. He is scheduled for surgery this coming Friday. He still has questions about my surgical plan. We have discussed resurfacing hemiarthroplasty versus total shoulder replacement. He mentions that he is still quite active and wants to continue working and doing physical work. 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 Medical History:  Patient's medications, allergies, past medical, surgical, social and family histories were reviewed and updated as appropriate. Pertinent items are noted in HPI  Review of systems reviewed from Patient History Form dated on May 10, 2017 and available in the patient's chart under the Media tab. Vital Signs:  Vitals:    10/25/17 1635   BP: 124/73   Pulse: 62     Constitutional: He is in no apparent distress. He appears his stated age of 59. Left Shoulder Examination:    Inspection:  The left shoulder is benign appearing. Palpation:  There is some crepitus and tenderness over the joint line. Active Range of Motion: Active forward elevation to 120°, external rotation at the side is to 20°. Internal rotation to the back to the lumbosacral junction. There is some end range discomfort. Passive Range of Motion:  Similar to his active range of motion. Strength:  Negative belly press test.  Good external rotation strength. Special Tests:  Distal neurovascular exam is intact. Comparison right shoulder examination:    Faint glenohumeral crepitus. Elevation is still preserved with good rhythm to 160°. Internal rotation to the back to L2. Good rotator cuff strength is noted. Radiology:     I reviewed plain radiographs obtained back in the spring. He has a large inferior humeral osteophyte.   The glenoid seems to be less involved. On the axillary lateral it appears to be an A2 glenoid. Assessment :  Advanced left shoulder primary osteoarthritis with concentric glenoid erosion. Given his activity level and relative youth we have discussed resurfacing hemiarthroplasty is a reasonable option. Obviously at surgery of the glenoid bone was soft or if there is significant wear that I have underappreciated then I will have no recourse but to convert to a stemmed anatomic total shoulder replacement. He accepts this possibility. Impression:  Encounter Diagnosis   Name Primary?  Primary osteoarthritis of left shoulder Yes       Office Procedures:  Orders Placed This Encounter   Procedures    DJO ultrasling IV Shoulder Sling     Patient was prescribed a DJO Ultrasling IV Shoulder Brace. The left shoulder will require stabilization / immobilization from this orthosis. The orthosis will assist in protecting the affected area, provide functional support and facilitate healing. The device was ordered and fit on 10/25/17. The patient was educated and fit by a healthcare professional with expert knowledge and specialization in brace application while under the direct supervision of the treating physician. Verbal and written instructions for the use of and application of this item were provided. They were instructed to contact the office immediately should the brace result in increased pain, decreased sensation, increased swelling or worsening of the condition. Treatment Plan:  Risks, benefits and potential complications of prosthetic shoulder arthroplasty surgery were discussed with the patient. We have discussed the possibility of resurfacing hemiarthroplasty versus stemmed anatomic total shoulder replacement.   Risks discussed include but are not limited to bleeding, infection, anesthetic risk, injury to nerves and blood vessels, deep vein thrombosis, residual stiffness and weakness, and the need

## 2017-10-27 ENCOUNTER — HOSPITAL ENCOUNTER (OUTPATIENT)
Dept: PHYSICAL THERAPY | Age: 57
Discharge: HOME | End: 2017-10-31
Attending: ORTHOPAEDIC SURGERY | Admitting: ORTHOPAEDIC SURGERY

## 2017-10-30 ENCOUNTER — HOSPITAL ENCOUNTER (OUTPATIENT)
Dept: PHYSICAL THERAPY | Age: 57
Discharge: HOME | End: 2017-10-30
Admitting: ORTHOPAEDIC SURGERY

## 2017-10-30 ENCOUNTER — TELEPHONE (OUTPATIENT)
Dept: ORTHOPEDIC SURGERY | Age: 57
End: 2017-10-30

## 2017-10-30 NOTE — FLOWSHEET NOTE
[facility]  Orthopaedics and Sports Rehabilitation, Daniel Duran       Physical Therapy Daily Treatment Note  Date:  10/30/2017    Patient Name:  Valerie Garcia    :  1960  MRN: [id number]  Restrictions/Precautions:    Medical/Treatment Diagnosis Information:  · Diagnosis: M19.012 Primary OA of L shoulder  //  S/P: L Shoulder Resurfacing Hemiarthroplasty, OBT 10/27/17  · Treatment Diagnosis: Pt presents post-operatively. Currently has significantly limited ROM in all directions, weakness of entire UE, swelling around shoulder and into forearm and hand, and decreased function. Insurance/Certification information:  PT Insurance Information: PT BENEFITS 2017 FACILITY/ CIGNA/ EFFECTIVE 16/ ACTIVE/ DED 1000 MET/ OOP 5850/ COPAY 50/ PAYS 80%/ 60 VPCY/ 0 AUTH/ 10-27-17 PAG  Physician Information:  Referring Practitioner: Gavin Smith  Plan of care signed (Y/N): pending    Date of Patient follow up with Physician: 17    G-Code (if applicable):      Date G-Code Applied:  10/30/17  PT G-Codes  Functional Assessment Tool Used: UEFI  Score: 47% deficit  Functional Limitation: Carrying, moving and handling objects  Carrying, Moving and Handling Objects Current Status (): At least 40 percent but less than 60 percent impaired, limited or restricted  Carrying, Moving and Handling Objects Goal Status ():  At least 1 percent but less than 20 percent impaired, limited or restricted    Progress Note: [x]  Yes  []  No  Next due by: Visit #10      Latex Allergy:  [x]NO      []YES  Preferred Language for Healthcare:   [x]English       []other:    Visit # Insurance Allowable   1 60 VPCY, 0 auth     Pain level:  0-3/10     SUBJECTIVE:  See eval    OBJECTIVE:   Deferred d/t PO status  ROM PROM AROM  Comment     L R L R     Flexion             Abduction             ER             IR             Other(cervical)             Other                 Deferred d/t PO status  Strength L R Comment   Flexion       Abduction         ER         IR         Supraspinatus         Upper Trap         Lower Trap         Mid Trap         Rhomboids         Biceps         Triceps         Horizontal Abduction         Horizontal Adduction         Lats            Deferred d/t PO status  Special Tests Results/Comment   Nolan Arredondo     OBriens     Other     Neurologic Signs     Functional Reach        Reflexes/Sensation:                         [x]Dermatomes/Myotomes intact                         [x]Reflexes equal and normal bilaterally                        []Other:     Joint mobility:                         []Normal                                   [x]Hypo                        []Hyper     Palpation: generalized TTP of shoulder     Functional Mobility/Transfers: Independent but altered to avoid use L arm     Posture: fair     Bandages/Dressings/Incisions: anterior incision, mesh covering, no active drainage, no sign of infection     Gait: (include devices/WB status):  WNL    RESTRICTIONS/PRECAUTIONS: surgical precautions; CHF, HTN    Exercises/Interventions:   Exercise/Equipment Resistance/Repetitions Other comments   Stretching/PROM     Wand     Table Slides 10 x 10\" Flexion   UE [place]     Pulleys     Pendulum     Elbow PROM 10 x 10\"         Isometrics     Retraction 3 x 10     30x    Weight shift     Flexion     Abduction     External Rotation     Internal Rotation     Biceps     Triceps          PRE's     Flexion     Abduction     External Rotation     Internal Rotation     Shrugs 3 x 10 0#   EXT     Reverse Flys     Serratus     Horizontal Abd with ER     Biceps     Triceps     Retraction          Cable Column/Theraband     External Rotation     Internal Rotation     Shrugs     Lats     Ext     Flex     Scapular Retraction     BIC     TRIC     PNF          Dynamic Stability          Plyoback          Manual interventions                     Therapeutic Exercise and NMR EXR  [x] (09091) Provided

## 2017-10-30 NOTE — PLAN OF CARE
wearing away. Was mildly better through the summer but he knew something needed to be done to fix this issue. Was limited with end-range elevation, reaching behind back or out to the side. Mostly was painful but would hit a point where he couldn't go any further. Pt had surgery on 10/27/17 for resurfacing hemiarthroplasty and OB Tenodesis. Doing okay since then. Has taken pain meds periodically to avoid pain increase and to make it more tolerable. Adjusted brace, which helped with pain. Has taken sling off once each day to change clothes without any issues. Sees Dr. Fay Crabtree in 2 days for post-op visit. Relevant Medical History: R shoulder lipoma removal  Functional Disability Index:PT G-Codes  Functional Assessment Tool Used: UEFI  Score: 47% deficit  Functional Limitation: Carrying, moving and handling objects  Carrying, Moving and Handling Objects Current Status (): At least 40 percent but less than 60 percent impaired, limited or restricted  Carrying, Moving and Handling Objects Goal Status (): At least 1 percent but less than 20 percent impaired, limited or restricted    Pain Scale: 0-3/10, aching  Easing factors:  Having sling on properly, medication  Provocative factors: accidentally moving or bumping arm      Type: []Constant   [x]Intermittent  []Radiating [x]Localized []other:     Numbness/Tingling: none - initially present in thumb and index finger but has subsided    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 Enjoys riding motorcycle, playing with grandchild, softball, lifting weights. Living Status/Prior Level of Function: Independent with ADLs and IADLs, household activities, yard work, work tasks, light recreation. Had to modify activity for the last 10-12 years but was unrestricted prior to initial injury.      OBJECTIVE:   Deferred d/t PO status  ROM PROM AROM  Comment    L R L R    Flexion

## 2017-10-30 NOTE — TELEPHONE ENCOUNTER
Called and spoke with patient's wife. Patient is doing very well with respect to pain and is taking the pain meds as needed. He went to PT today and will follow up for his post op on Wednesday. He has been feeling hot and cold at times. I advised that it is normal to run a low grade fever after surgery but to call us if his temp is concerning. He is also having issues with constipation from the pain meds. I advised a stool softener over the counter.

## 2017-11-01 ENCOUNTER — HOSPITAL ENCOUNTER (OUTPATIENT)
Dept: PHYSICAL THERAPY | Age: 57
Discharge: HOME | End: 2017-11-30
Attending: ORTHOPAEDIC SURGERY | Admitting: ORTHOPAEDIC SURGERY

## 2017-11-01 ENCOUNTER — OFFICE VISIT (OUTPATIENT)
Dept: ORTHOPEDIC SURGERY | Age: 57
End: 2017-11-01

## 2017-11-01 VITALS
SYSTOLIC BLOOD PRESSURE: 126 MMHG | WEIGHT: 235 LBS | DIASTOLIC BLOOD PRESSURE: 68 MMHG | HEIGHT: 68 IN | BODY MASS INDEX: 35.61 KG/M2 | HEART RATE: 53 BPM

## 2017-11-01 DIAGNOSIS — Z96.612 S/P SHOULDER HEMIARTHROPLASTY, LEFT: Primary | ICD-10-CM

## 2017-11-01 DIAGNOSIS — M25.512 LEFT SHOULDER PAIN, UNSPECIFIED CHRONICITY: ICD-10-CM

## 2017-11-01 LAB
ALBUMIN SERPL-MCNC: 4 G/DL (ref 3.4–5)
ANION GAP SERPL CALCULATED.3IONS-SCNC: 14 MMOL/L (ref 3–16)
BUN BLDV-MCNC: 20 MG/DL (ref 7–20)
CALCIUM SERPL-MCNC: 8.9 MG/DL (ref 8.3–10.6)
CHLORIDE BLD-SCNC: 95 MMOL/L (ref 99–110)
CO2: 28 MMOL/L (ref 21–32)
CREAT SERPL-MCNC: 0.9 MG/DL (ref 0.9–1.3)
GFR AFRICAN AMERICAN: >60
GFR NON-AFRICAN AMERICAN: >60
GLUCOSE BLD-MCNC: 111 MG/DL (ref 70–99)
PHOSPHORUS: 3.6 MG/DL (ref 2.5–4.9)
POTASSIUM SERPL-SCNC: 4.1 MMOL/L (ref 3.5–5.1)
SODIUM BLD-SCNC: 137 MMOL/L (ref 136–145)

## 2017-11-01 PROCEDURE — 99024 POSTOP FOLLOW-UP VISIT: CPT | Performed by: ORTHOPAEDIC SURGERY

## 2017-11-01 PROCEDURE — 73030 X-RAY EXAM OF SHOULDER: CPT | Performed by: ORTHOPAEDIC SURGERY

## 2017-11-06 ENCOUNTER — HOSPITAL ENCOUNTER (OUTPATIENT)
Dept: PHYSICAL THERAPY | Age: 57
Discharge: HOME | End: 2017-11-06
Admitting: ORTHOPAEDIC SURGERY

## 2017-11-06 NOTE — FLOWSHEET NOTE
[facility]  Orthopaedics and Sports Rehabilitation, [facility]       Physical Therapy Daily Treatment Note  Date:  2017    Patient Name:  Nataliia Tee    :  1960  MRN: [id number]  Restrictions/Precautions:    Medical/Treatment Diagnosis Information:  · Diagnosis: M19.012 Primary OA of L shoulder  //  S/P: L Shoulder Resurfacing Hemiarthroplasty, OBR 10/27/17  · Treatment Diagnosis: Pt presents post-operatively. Currently has significantly limited ROM in all directions, weakness of entire UE, swelling around shoulder and into forearm and hand, and decreased function. Insurance/Certification information:  PT Insurance Information: PT BENEFITS 2017 FACILITY/ CIGNA/ EFFECTIVE 16/ ACTIVE/ DED 1000 MET/ OOP 5850/ COPAY 50/ PAYS 80%/ 60 VPCY/ 0 AUTH/ 10-27-17 PAG  Physician Information:  Referring Practitioner: Meka Baig  Plan of care signed (Y/N): pending    Date of Patient follow up with Physician: 17    G-Code (if applicable):      Date G-Code Applied:  10/30/17       Progress Note: [x]  Yes  []  No  Next due by: Visit #10      Latex Allergy:  [x]NO      []YES  Preferred Language for Healthcare:   [x]English       []other:    Visit # Insurance Allowable   2 60 VPCY, 0 auth     Pain level:  0-3/10     SUBJECTIVE:  Pt is doing well with stated decreased pain from IE. Reports compliance with HEP without any c/o pain or discomfort. Notes he has increased the motion CPM by himself as he wasn't feeling a stretch. Would like to know if he can help a friend with work.       OBJECTIVE:   Deferred d/t PO status  ROM PROM AROM  Comment     L R L R     Flexion  90           Abduction             ER  0           IR             Other(cervical)             Other                 Deferred d/t PO status  Strength L R Comment   Flexion         Abduction         ER         IR         Supraspinatus         Upper Trap         Lower Trap         Mid Trap         Rhomboids         Biceps       and UE control with self care, reaching, carrying, lifting, house/yardwork, driving/computer work. [x] (69209) Provided verbal/tactile cueing for activities related to improving balance, coordination, kinesthetic sense, posture, motor skill, proprioception  to assist with  scapular, scapulothoracic and UE control with self care, reaching, carrying, lifting, house/yardwork, driving/computer work. Therapeutic Activities:    [x] (96996 or 26153) Provided verbal/tactile cueing for activities related to improving balance, coordination, kinesthetic sense, posture, motor skill, proprioception and motor activation to allow for proper function of scapular, scapulothoracic and UE control with self care, carrying, lifting, driving/computer work.      Home Exercise Program:    [x] (73714) Reviewed/Progressed HEP activities related to strengthening, flexibility, endurance, ROM of scapular, scapulothoracic and UE control with self care, reaching, carrying, lifting, house/yardwork, driving/computer work  [] (34092) Reviewed/Progressed HEP activities related to improving balance, coordination, kinesthetic sense, posture, motor skill, proprioception of scapular, scapulothoracic and UE control with self care, reaching, carrying, lifting, house/yardwork, driving/computer work      Manual Treatments:  PROM / STM / Oscillations-Mobs:  G-I, II, III, IV (PA's, Inf., Post.)  [] (41665) Provided manual therapy to mobilize soft tissue/joints of cervical/CT, scapular GHJ and UE for the purpose of modulating pain, promoting relaxation,  increasing ROM, reducing/eliminating soft tissue swelling/inflammation/restriction, improving soft tissue extensibility and allowing for proper ROM for normal function with self care, reaching, carrying, lifting, house/yardwork, driving/computer work    Modalities: declined    Charges:  Timed Code Treatment Minutes: 30'   Total Treatment Minutes: 39'       [] KRISS (LOW) 66137 (typically 20 minutes face-to-face)  [] EVAL (MOD) 71661 (typically 30 minutes face-to-face)  [] EVAL (HIGH) 27253 (typically 45 minutes face-to-face)  [] RE-EVAL     [x] NI(39816) x  2   [] IONTO  [] NMR (11414) x      [] VASO  [] Manual (72591) x       [] Other:  [x] TA x  1    [] Mech Traction (53157)  [] ES(attended) (15857)      [] ES (un) (43042):     GOALS:  Patient stated goal: Return to PLOF unrestricted.      Therapist goals for Patient:   Short Term Goals: To be achieved in: 2 weeks  1. Independent in HEP and progression per patient tolerance, in order to prevent re-injury. 2. Patient will have a decrease in pain to facilitate improvement in movement, function, and ADLs as indicated by Functional Deficits.     Long Term Goals: To be achieved in: 12 weeks  1. Disability index score of 30% or less for the UEFS to assist with reaching prior level of function. 2. Patient will demonstrate increased AROM to 150 deg flexion to allow for proper joint functioning as indicated by patients Functional Deficits. 3. Patient will demonstrate an increase in Strength to good scapular and core control  for UE to allow for proper functional mobility as indicated by patients Functional Deficits. 4. Patient will return to household functional activities without increased symptoms or restriction. 5. Pt will be able to reach behind back without restriction or symptoms. (patient specific functional goal)                         Progression Towards Functional goals:  [] Patient is progressing as expected towards functional goals listed. [] Progression is slowed due to complexities listed. [] Progression has been slowed due to co-morbidities. [x] Plan just implemented, too soon to assess goals progression  [] Other:     ASSESSMENT:  Pt tolerated treatment significantly well without any cues for technique and demonstrated a strong understanding of his HEP.   However, pt did require strong verbal cueing to not overdo exercises and stay

## 2017-11-10 ENCOUNTER — OFFICE VISIT (OUTPATIENT)
Dept: INTERNAL MEDICINE | Age: 57
End: 2017-11-10

## 2017-11-10 VITALS
WEIGHT: 243 LBS | BODY MASS INDEX: 36.83 KG/M2 | HEIGHT: 68 IN | SYSTOLIC BLOOD PRESSURE: 120 MMHG | DIASTOLIC BLOOD PRESSURE: 80 MMHG

## 2017-11-10 DIAGNOSIS — I10 HYPERTENSION, BENIGN: ICD-10-CM

## 2017-11-10 DIAGNOSIS — R23.4 PEELING SKIN: Primary | ICD-10-CM

## 2017-11-10 DIAGNOSIS — R21 SKIN RASH: ICD-10-CM

## 2017-11-10 LAB
ALBUMIN SERPL-MCNC: 3.9 G/DL (ref 3.4–5)
ANION GAP SERPL CALCULATED.3IONS-SCNC: 14 MMOL/L (ref 3–16)
BUN BLDV-MCNC: 19 MG/DL (ref 7–20)
CALCIUM SERPL-MCNC: 9.1 MG/DL (ref 8.3–10.6)
CHLORIDE BLD-SCNC: 100 MMOL/L (ref 99–110)
CO2: 27 MMOL/L (ref 21–32)
CREAT SERPL-MCNC: 0.9 MG/DL (ref 0.9–1.3)
GFR AFRICAN AMERICAN: >60
GFR NON-AFRICAN AMERICAN: >60
GLUCOSE BLD-MCNC: 130 MG/DL (ref 70–99)
PHOSPHORUS: 3 MG/DL (ref 2.5–4.9)
POTASSIUM SERPL-SCNC: 4.1 MMOL/L (ref 3.5–5.1)
SODIUM BLD-SCNC: 141 MMOL/L (ref 136–145)

## 2017-11-10 PROCEDURE — 99214 OFFICE O/P EST MOD 30 MIN: CPT | Performed by: HOSPITALIST

## 2017-11-10 RX ORDER — CLOBETASOL PROPIONATE 0.5 MG/G
OINTMENT TOPICAL
Qty: 60 G | Refills: 2 | Status: SHIPPED | OUTPATIENT
Start: 2017-11-10 | End: 2018-08-02 | Stop reason: SDUPTHER

## 2017-11-10 ASSESSMENT — PATIENT HEALTH QUESTIONNAIRE - PHQ9
1. LITTLE INTEREST OR PLEASURE IN DOING THINGS: 0
SUM OF ALL RESPONSES TO PHQ9 QUESTIONS 1 & 2: 0
SUM OF ALL RESPONSES TO PHQ QUESTIONS 1-9: 0
2. FEELING DOWN, DEPRESSED OR HOPELESS: 0

## 2017-11-10 NOTE — PROGRESS NOTES
Follow Up Visit Established Patient Visit    Patient:  Rebel Akins                                               : 1960  Age: 59 y.o. MRN: [id number]  Date : 11/10/2017    Rebel Akins is a 59 y.o. male who presents for :  Follow-up appointment. Chief Complaint   Patient presents with    Medication Reaction     hands peeling skin and burning,started after taking Earna Drop underwent left shoulder hemiarthroplasty on 10/28/17. Used Norco for several days after surgery for pain control. Reports skin peeling from his both hands. Had similar symptoms after using Percocet in the past.  No fever/chills. No nausea, no vomiting, no diarrhea. He reported episode of acute kidney injury in the past after use of Percocet. He also reports skin rash, possibly, secondary to psoriasis on his right shin and left knee areas. No chest pain. No cough. No dysuria/ hematuria. He doesn't report left shoulder pain. Doesn't check his blood pressure at home. Past Medical History:   Diagnosis Date    Arthritis     CHF (congestive heart failure) ([address]     [facility]     Hypertension, benign     Kidney stone     Lipoma     removal righy shoulder    Other drug allergy(995.27)     Psoriasis     pt states it is worse since being on Metoprolol    Thyroid disease     enlarged    Unspecified sleep apnea     no CPAP. Has not done a sleep study       Past Surgical History:   Procedure Laterality Date    HERNIA REPAIR      right     JOINT REPLACEMENT  10/27/2017    left shoulder    LIPOMA RESECTION      OTHER SURGICAL HISTORY Left 10/27/2017    LEFT SHOULDER RESURFACTING HEMIARTHROPLASTY, OPEN BICEPS    TONSILLECTOMY         Current Outpatient Prescriptions   Medication Sig Dispense Refill    clobetasol (TEMOVATE) 0.05 % ointment Apply topically 2 times daily.  60 g 2    aspirin 325 MG EC tablet Take 1 tablet by mouth 2 times daily 30 tablet 3    Zinc Sulfate (ZINC

## 2017-11-15 ENCOUNTER — OFFICE VISIT (OUTPATIENT)
Dept: ORTHOPEDIC SURGERY | Age: 57
End: 2017-11-15

## 2017-11-15 VITALS
WEIGHT: 243 LBS | HEART RATE: 51 BPM | BODY MASS INDEX: 36.83 KG/M2 | DIASTOLIC BLOOD PRESSURE: 85 MMHG | HEIGHT: 68 IN | SYSTOLIC BLOOD PRESSURE: 134 MMHG

## 2017-11-15 DIAGNOSIS — Z96.612 S/P SHOULDER HEMIARTHROPLASTY, LEFT: Primary | ICD-10-CM

## 2017-11-15 DIAGNOSIS — M25.512 LEFT SHOULDER PAIN, UNSPECIFIED CHRONICITY: ICD-10-CM

## 2017-11-15 PROCEDURE — 99024 POSTOP FOLLOW-UP VISIT: CPT | Performed by: ORTHOPAEDIC SURGERY

## 2017-11-19 NOTE — PROGRESS NOTES
History of Present Illness:  Cindy Costello is a 60 yo M here for follow up of his left shoulder. He underwent a left hemiarthroplasty on 10/27/17. He is currently 3 weeks post op. He reports that he is feeling good and that the he has very limited pain. He states he does wear his sling but has taken it off at home and at nighttime if it gets too uncomfortable. He states he has only needed to go to physical therapy once a week. He states he feels like he can do more than his current restrictions allow him to do. He denies any fevers, chills, numbness or tingling. Medical History:  Patient's medications, allergies, past medical, surgical, social and family histories were reviewed and updated as appropriate. No notes on file    Review of Systems  A 14 point review of systems was completed by the patient on 11/15/2017 and is available in the media section of the scanned medical record and was reviewed on 11/18/2017. The review is negative with the exception of those things mentioned in the HPI and Past Medical History    Vital Signs:  Vitals:    11/15/17 1653   BP: 134/85   Pulse: 51       General/Appearance: Alert and oriented and in no apparent distress. Skin:  There are no skin lesions, cellulitis, or extreme edema. The patient has warm and well-perfused Bilateral upper extremities with brisk capillary refill. Left Shoulder Exam:  Inspection: his anterior shoulder incision is C/D/I. It is healing well without any erythema or drainage. No gross deformities, no signs of infection. Palpation:  No crepitus    Active Range of Motion: deferred    Passive Range of Motion: Forward elevation of 120, external rotation of 20    Strength:  deferred    Special Tests:  No Estuardo muscle deformity.     Neurovascular: Sensation to light touch is intact, no motor deficits, palpable radial pulses 2+         Assessment :  Mr. Cindy Costello is a 59 y.o. yr old patient who is 3 weeks s/p from a left hemiarthroplasty. He is off to a very good start. Impression:  Encounter Diagnoses   Name Primary?  S/P shoulder hemiarthroplasty, left Yes    Left shoulder pain, unspecified chronicity        Office Procedures:  Orders Placed This Encounter   Procedures    Ambulatory referral to Physical Therapy     Referral Priority:   Routine     Referral Type:   Eval and Treat     Referral Reason:   Specialty Services Required     Requested Specialty:   Physical Therapy     Number of Visits Requested:   1       Treatment Plan:  He was again told the importance of following the given instrucitons and restrictions regardless of well he feels like he is doing. He was asked to wear the sling while in a crowd. He can discontinue it while at home. He was told that he may drive an car if it is automatic but would need to wait until he is 6 weeks post op to drive a manual car. He was given updated physical therapy orders. All of his questions were answered. We will see him back in 3 weeks for follow up.        7:20 PM      Torsten Ricardo, [address] Physician Assistant    During this examination, Torsten GRAVES PA-C, functioned as a scribe for Dr. Pedro Pablo Dalton. This dictation was performed with a verbal recognition program (DRAGON) and it was checked for errors. It is possible that there are still dictated errors within this office note. If so, please bring any errors to my attention for an addendum. All efforts were made to ensure that this office note is accurate.  _____  ELY, Dr. Pedro Pablo Dalton, personally performed the services described in this documentation as described by Torsten Ricardo PA-C in my presence, and it is both accurate and complete. Anel Whitfield MD, PhD  11/15/2017

## 2017-11-20 ENCOUNTER — HOSPITAL ENCOUNTER (OUTPATIENT)
Dept: PHYSICAL THERAPY | Age: 57
Discharge: HOME | End: 2017-11-20
Admitting: ORTHOPAEDIC SURGERY

## 2017-11-20 NOTE — FLOWSHEET NOTE
[facility] [employer]  Orthopaedics and Sports Rehabilitation, Augustina Perez       Physical Therapy Daily Treatment Note  Date:  2017    Patient Name:  Santos Fontenot    :  1960  MRN: [id number]  Restrictions/Precautions:    Medical/Treatment Diagnosis Information:  · Diagnosis: M19.012 Primary OA of L shoulder  //  S/P: L Shoulder Resurfacing Hemiarthroplasty, OBR 10/27/17  · Treatment Diagnosis: Pt presents post-operatively. Currently has significantly limited ROM in all directions, weakness of entire UE, swelling around shoulder and into forearm and hand, and decreased function. Insurance/Certification information:  PT Insurance Information: PT BENEFITS 2017 FACILITY/ CIGNA/ EFFECTIVE 16/ ACTIVE/ DED 1000 MET/ OOP 5850/ COPAY 50/ PAYS 80%/ 60 VPCY/ 0 AUTH/ 10-27-17 PAG  Physician Information:  Referring Practitioner: Francine Lafleur  Plan of care signed (Y/N): pending    Date of Patient follow up with Physician: 17    G-Code (if applicable):      Date G-Code Applied:  10/30/17       Progress Note: [x]  Yes  []  No  Next due by: Visit #10      Latex Allergy:  [x]NO      []YES  Preferred Language for Healthcare:   [x]English       []other:    Visit # Insurance Allowable   3 60 VPCY, 0 auth     Pain level:  0/10     SUBJECTIVE:  Pt is doing well without any c/o pain or discomfort, unless he \"moves it the wrong way\". Notes seeing MD with a good report and is able to start driving and not wear his sling unless he is in public. Reports compliance with HEP without any c/o pain or discomfort.       OBJECTIVE:   Deferred d/t PO status          ROM PROM AROM  Comment     L R L R     Flexion  120           Abduction             ER  20           IR             Other(cervical)             Other                 Deferred d/t PO status  Strength L R Comment   Flexion         Abduction         ER         IR            Deferred d/t PO status  Special Tests Results/Comment   Nolan Ontiveros   Speeds     [place]ri[place]     Other     Neurologic Signs     Functional Reach        Reflexes/Sensation:                         [x]Dermatomes/Myotomes intact                         [x]Reflexes equal and normal bilaterally                        []Other:     Joint mobility:                         []Normal                                   [x]Hypo                        []Hyper     Palpation: generalized TTP of shoulder     Functional Mobility/Transfers: Independent but altered to avoid use L arm     Posture: fair     Bandages/Dressings/Incisions: anterior incision, mesh covering, no active drainage, no sign of infection     Gait: (include devices/WB status): WNL    RESTRICTIONS/PRECAUTIONS: surgical precautions; CHF, HTN    Exercises/Interventions:   Exercise/Equipment Resistance/Repetitions Other comments   Stretching/PROM     Wand     Table Slides 10 x 10\" Flexion to 120deg   UE [place]     Pulleys     Pendulum     Elbow AROM 30 x 10\"         Isometrics     Retraction 3 x 15     Blue, 30x    Weight shift     Flexion     Abduction     External Rotation     Internal Rotation     Biceps     Triceps          PRE's     Flexion     Abduction     External Rotation     Internal Rotation     Shrugs 3 x 15 0#   EXT     Reverse Flys     Serratus     Horizontal Abd with ER     Biceps     Triceps     Retraction          Cable Column/Theraband     External Rotation     Internal Rotation     Shrugs     Lats     Ext     Flex     Scapular Retraction     BIC     TRIC     PNF          Dynamic Stability          [place]          Manual interventions                     Therapeutic Exercise and NMR EXR  [x] (58719) Provided verbal/tactile cueing for activities related to strengthening, flexibility, endurance, ROM  for improvements in scapular, scapulothoracic and UE control with self care, reaching, carrying, lifting, house/yardwork, driving/computer work.     [x] (44968) Provided verbal/tactile cueing for activities related to NMR (45309) x      [] VASO  [] Manual (96697) x       [] Other:  [] TA x       [] Mech Traction (00592)  [] ES(attended) (96716)      [] ES (un) (55992):     GOALS:  Patient stated goal: Return to PLOF unrestricted.      Therapist goals for Patient:   Short Term Goals: To be achieved in: 2 weeks  1. Independent in HEP and progression per patient tolerance, in order to prevent re-injury. 2. Patient will have a decrease in pain to facilitate improvement in movement, function, and ADLs as indicated by Functional Deficits.     Long Term Goals: To be achieved in: 12 weeks  1. Disability index score of 30% or less for the UEFS to assist with reaching prior level of function. 2. Patient will demonstrate increased AROM to 150 deg flexion to allow for proper joint functioning as indicated by patients Functional Deficits. 3. Patient will demonstrate an increase in Strength to good scapular and core control  for UE to allow for proper functional mobility as indicated by patients Functional Deficits. 4. Patient will return to household functional activities without increased symptoms or restriction. 5. Pt will be able to reach behind back without restriction or symptoms. (patient specific functional goal)                         Progression Towards Functional goals:  [] Patient is progressing as expected towards functional goals listed. [] Progression is slowed due to complexities listed. [] Progression has been slowed due to co-morbidities. [x] Plan just implemented, too soon to assess goals progression  [] Other:     ASSESSMENT:  Pt tolerated treatment significantly well without any cues for technique and demonstrated a strong understanding of his HEP. However, pt did require strong verbal cueing to not overdo exercises and stay within parameters of protocol to allow shoulder to heal appropriately. Pt verbalized understanding with all questions answered.       Treatment/Activity Tolerance:  [x] Patient tolerated treatment well [x] Patient limited by fatique  [] Patient limited by pain  [] Patient limited by other medical complications  [] Other:     Patient education:  10/30/17: surgical restrictions/precautions, HEP, icing, sling usage    Prognosis: [x] Good [] Fair  [] Poor    Patient Requires Follow-up: [x] Yes  [] No    PLAN: See eval  [x] Continue per plan of care [] Alter current plan (see comments)  [] Plan of care initiated [] Hold pending MD visit [] Discharge    Electronically signed by: Nery Martel PT, DPT      Torsten Morris. Leora Verdugo, DPT, [id number]  Physical Therapist  [email or web address]

## 2017-12-01 ENCOUNTER — HOSPITAL ENCOUNTER (OUTPATIENT)
Dept: PHYSICAL THERAPY | Age: 57
Discharge: HOME | End: 2017-12-31
Attending: ORTHOPAEDIC SURGERY | Admitting: ORTHOPAEDIC SURGERY

## 2017-12-15 ENCOUNTER — HOSPITAL ENCOUNTER (OUTPATIENT)
Dept: PHYSICAL THERAPY | Age: 57
Discharge: HOME | End: 2017-12-15
Admitting: ORTHOPAEDIC SURGERY

## 2017-12-15 NOTE — FLOWSHEET NOTE
[facility] [employer]  Orthopaedics and Sports Rehabilitation, Cindy Pate       Physical Therapy Daily Treatment Note  Date:  12/15/2017    Patient Name:  Berta Ramirez    :  1960  MRN: [id number]  Restrictions/Precautions:    Medical/Treatment Diagnosis Information:  · Diagnosis: M19.012 Primary OA of L shoulder  //  S/P: L Shoulder Resurfacing Hemiarthroplasty, OBR 10/27/17  · Treatment Diagnosis: Pt presents post-operatively. Currently has significantly limited ROM in all directions, weakness of entire UE, swelling around shoulder and into forearm and hand, and decreased function. Insurance/Certification information:  PT Insurance Information: PT BENEFITS 2017 FACILITY/ CIGNA/ EFFECTIVE 16/ ACTIVE/ DED 1000 MET/ OOP 5850/ COPAY 50/ PAYS 80%/ 60 VPCY/ 0 AUTH/ 10-27-17 PAG  Physician Information:  Referring Practitioner: Shana Baez  Plan of care signed (Y/N): pending    Date of Patient follow up with Physician: 17    G-Code (if applicable):      Date G-Code Applied:  10/30/17       Progress Note: [x]  Yes  []  No  Next due by: Visit #10      Latex Allergy:  [x]NO      []YES  Preferred Language for Healthcare:   [x]English       []other:    Visit # Insurance Allowable   4 60 VPCY, 0 auth     Pain level:  0/10     SUBJECTIVE:  Pt is doing well without any c/o pain or discomfort, unless he \"moves it the wrong way\", where it can spike to a 5/10. Reports compliance with HEP without any c/o pain or discomfort. Although, readily admits he has been doing more with his arm than he should.       OBJECTIVE:   Deferred d/t PO status          ROM PROM AROM  Comment     L R L R     Flexion  120           Abduction             ER  20           IR             Other(cervical)             Other                 Deferred d/t PO status  Strength L R Comment   Flexion         Abduction         ER         IR            Deferred d/t PO status  Special Tests Results/Comment   Nolan Arredondo   understanding with all questions answered. Treatment/Activity Tolerance:  [x] Patient tolerated treatment well [x] Patient limited by fatique  [] Patient limited by pain  [] Patient limited by other medical complications  [] Other:     Patient education:  10/30/17: surgical restrictions/precautions, HEP, icing, sling usage    Prognosis: [x] Good [] Fair  [] Poor    Patient Requires Follow-up: [x] Yes  [] No    PLAN: See eval  [x] Continue per plan of care [] Alter current plan (see comments)  [] Plan of care initiated [] Hold pending MD visit [] Discharge    Electronically signed by: Bhakti Lancaster PT, DPT      Herb Pederson. Philip Velasquez, WILT, [id number]  Physical Therapist  [email or web address]

## 2017-12-20 ENCOUNTER — HOSPITAL ENCOUNTER (OUTPATIENT)
Dept: PHYSICAL THERAPY | Age: 57
Discharge: HOME | End: 2017-12-20
Admitting: ORTHOPAEDIC SURGERY

## 2017-12-20 NOTE — FLOWSHEET NOTE
[facility] [employer]  Orthopaedics and Sports Rehabilitation, Candice Lange       Physical Therapy Daily Treatment Note  Date:  2017    Patient Name:  Bev Stewart    :  1960  MRN: [id number]  Restrictions/Precautions:    Medical/Treatment Diagnosis Information:  · Diagnosis: M19.012 Primary OA of L shoulder  //  S/P: L Shoulder Resurfacing Hemiarthroplasty, OBR 10/27/17  · Treatment Diagnosis: Pt presents post-operatively. Currently has significantly limited ROM in all directions, weakness of entire UE, swelling around shoulder and into forearm and hand, and decreased function. Insurance/Certification information:  PT Insurance Information: PT BENEFITS 2017 FACILITY/ CIGNA/ EFFECTIVE 16/ ACTIVE/ DED 1000 MET/ OOP 5850/ COPAY 50/ PAYS 80%/ 60 VPCY/ 0 AUTH/ 10-27-17 PAG  Physician Information:  Referring Practitioner: Mylene Bazan  Plan of care signed (Y/N): pending    Date of Patient follow up with Physician: 17    G-Code (if applicable):      Date G-Code Applied:  10/30/17       Progress Note: [x]  Yes  []  No  Next due by: Visit #10      Latex Allergy:  [x]NO      []YES  Preferred Language for Healthcare:   [x]English       []other:    Visit # Insurance Allowable   5 60 VPCY, 0 auth     Pain level:  0-3/10     SUBJECTIVE:  Pt states he is doing alright with intermittent throbbing down the back of his shoulder and to his elbow. Notes working yesterday carrying equipment and working on his bike. Reports compliance with HEP without any c/o pain or discomfort. Denies any pain or soreness after last visit.       OBJECTIVE:   Deferred d/t PO status          ROM PROM AROM  Comment     L R L R     Flexion  120           Abduction             ER  20           IR             Other(cervical)             Other                 Deferred d/t PO status  Strength L R Comment   Flexion         Abduction         ER         IR            Deferred d/t PO status  Special Tests Results/Comment and implementation; pt verbalized understanding with all questions answered. Treatment/Activity Tolerance:  [x] Patient tolerated treatment well [x] Patient limited by fatique  [] Patient limited by pain  [] Patient limited by other medical complications  [] Other:     Patient education:  10/30/17: surgical restrictions/precautions, HEP, icing, sling usage    Prognosis: [x] Good [] Fair  [] Poor    Patient Requires Follow-up: [x] Yes  [] No    PLAN: See eval  [x] Continue per plan of care [] Alter current plan (see comments)  [] Plan of care initiated [] Hold pending MD visit [] Discharge    Electronically signed by: Natalie Call PT, DPT      Fely Martini. Jelena Carcamo, WILT, [id number]  Physical Therapist  [email or web address]

## 2017-12-28 ENCOUNTER — HOSPITAL ENCOUNTER (OUTPATIENT)
Dept: PHYSICAL THERAPY | Age: 57
Discharge: HOME | End: 2017-12-28
Admitting: ORTHOPAEDIC SURGERY

## 2017-12-28 NOTE — FLOWSHEET NOTE
carrying, lifting, house/yardwork, driving/computer work. [x] (60866) Provided verbal/tactile cueing for activities related to improving balance, coordination, kinesthetic sense, posture, motor skill, proprioception  to assist with  scapular, scapulothoracic and UE control with self care, reaching, carrying, lifting, house/yardwork, driving/computer work. Therapeutic Activities:    [x] (28051 or 15617) Provided verbal/tactile cueing for activities related to improving balance, coordination, kinesthetic sense, posture, motor skill, proprioception and motor activation to allow for proper function of scapular, scapulothoracic and UE control with self care, carrying, lifting, driving/computer work.      Home Exercise Program:    [x] (12668) Reviewed/Progressed HEP activities related to strengthening, flexibility, endurance, ROM of scapular, scapulothoracic and UE control with self care, reaching, carrying, lifting, house/yardwork, driving/computer work  [] (13562) Reviewed/Progressed HEP activities related to improving balance, coordination, kinesthetic sense, posture, motor skill, proprioception of scapular, scapulothoracic and UE control with self care, reaching, carrying, lifting, house/yardwork, driving/computer work      Manual Treatments:  PROM / STM / Oscillations-Mobs:  G-I, II, III, IV (PA's, Inf., Post.)  [] (07428) Provided manual therapy to mobilize soft tissue/joints of cervical/CT, scapular GHJ and UE for the purpose of modulating pain, promoting relaxation,  increasing ROM, reducing/eliminating soft tissue swelling/inflammation/restriction, improving soft tissue extensibility and allowing for proper ROM for normal function with self care, reaching, carrying, lifting, house/yardwork, driving/computer work    Modalities: declined    Charges:  Timed Code Treatment Minutes: 40   Total Treatment Minutes: 40       [] EVAL (LOW) 24054 (typically 20 minutes face-to-face)  [] EVAL (MOD) 33826 (typically 30 minutes face-to-face)  [] EVAL (HIGH) 33739 (typically 45 minutes face-to-face)  [] RE-EVAL     [x] DS(98066) x  2   [] IONTO  [] NMR (84493) x      [] VASO  [] Manual (51663) x       [] Other:  [x] TA x  1    [] Mech Traction (69273)  [] ES(attended) (23695)      [] ES (un) (05547):     GOALS:  Patient stated goal: Return to PLOF unrestricted.      Therapist goals for Patient:   Short Term Goals: To be achieved in: 2 weeks  1. Independent in HEP and progression per patient tolerance, in order to prevent re-injury. 2. Patient will have a decrease in pain to facilitate improvement in movement, function, and ADLs as indicated by Functional Deficits.     Long Term Goals: To be achieved in: 12 weeks  1. Disability index score of 30% or less for the UEFS to assist with reaching prior level of function. 2. Patient will demonstrate increased AROM to 150 deg flexion to allow for proper joint functioning as indicated by patients Functional Deficits. 3. Patient will demonstrate an increase in Strength to good scapular and core control  for UE to allow for proper functional mobility as indicated by patients Functional Deficits. 4. Patient will return to household functional activities without increased symptoms or restriction. 5. Pt will be able to reach behind back without restriction or symptoms. (patient specific functional goal)                         Progression Towards Functional goals:  [] Patient is progressing as expected towards functional goals listed. [] Progression is slowed due to complexities listed. [] Progression has been slowed due to co-morbidities. [x] Plan just implemented, too soon to assess goals progression  [] Other:     ASSESSMENT:  Pt tolerated treatment significantly well demonstrating a continued strong understanding of his HEP and progressions. Pt did require minimal verbal cueing to keep arm at side during ER stretch.    PT was able to progress pt to bicep and tricep strengthening this date without difficulty. PT updated HEP and reviewed with pt in regard to progression and implementation; pt verbalized understanding with all questions answered. Treatment/Activity Tolerance:  [x] Patient tolerated treatment well [x] Patient limited by fatique  [] Patient limited by pain  [] Patient limited by other medical complications  [] Other:     Patient education:  10/30/17: surgical restrictions/precautions, HEP, icing, sling usage    Prognosis: [x] Good [] Fair  [] Poor    Patient Requires Follow-up: [x] Yes  [] No    PLAN: See eval  [x] Continue per plan of care [] Alter current plan (see comments)  [] Plan of care initiated [] Hold pending MD visit [] Discharge    Electronically signed by: Soraida Christopher PT, DPT      Walker County Hospitalcathy Macias. WIL WhiteT, [id number]  Physical Therapist  [email or web address]

## 2018-01-01 ENCOUNTER — HOSPITAL ENCOUNTER (OUTPATIENT)
Dept: PHYSICAL THERAPY | Age: 58
Discharge: HOME | End: 2018-01-31
Attending: ORTHOPAEDIC SURGERY | Admitting: ORTHOPAEDIC SURGERY

## 2018-01-03 ENCOUNTER — OFFICE VISIT (OUTPATIENT)
Dept: ORTHOPEDIC SURGERY | Age: 58
End: 2018-01-03

## 2018-01-03 VITALS
HEIGHT: 68 IN | DIASTOLIC BLOOD PRESSURE: 74 MMHG | HEART RATE: 58 BPM | WEIGHT: 243 LBS | BODY MASS INDEX: 36.83 KG/M2 | SYSTOLIC BLOOD PRESSURE: 117 MMHG

## 2018-01-03 DIAGNOSIS — Z96.612 STATUS POST LEFT SHOULDER HEMIARTHROPLASTY: Primary | ICD-10-CM

## 2018-01-03 PROCEDURE — 73030 X-RAY EXAM OF SHOULDER: CPT | Performed by: ORTHOPAEDIC SURGERY

## 2018-01-03 PROCEDURE — 99024 POSTOP FOLLOW-UP VISIT: CPT | Performed by: ORTHOPAEDIC SURGERY

## 2018-01-04 ENCOUNTER — HOSPITAL ENCOUNTER (OUTPATIENT)
Dept: PHYSICAL THERAPY | Age: 58
Discharge: HOME | End: 2018-01-04
Admitting: ORTHOPAEDIC SURGERY

## 2018-01-11 ENCOUNTER — HOSPITAL ENCOUNTER (OUTPATIENT)
Dept: PHYSICAL THERAPY | Age: 58
Discharge: HOME | End: 2018-01-11
Admitting: ORTHOPAEDIC SURGERY

## 2018-01-11 NOTE — FLOWSHEET NOTE
[facility] [employer] [employer]  Orthopaedics and Sports Rehabilitation, [facility]       Physical Therapy Daily Treatment Note  Date:  2018    Patient Name:  Percio Paul    :  1960  MRN: [id number]  Restrictions/Precautions:    Medical/Treatment Diagnosis Information:  · Diagnosis: M19.012 Primary OA of L shoulder  //  S/P: L Shoulder Resurfacing Hemiarthroplasty, OBR 10/27/17  · Treatment Diagnosis: Pt presents post-operatively. Currently has significantly limited ROM in all directions, weakness of entire UE, swelling around shoulder and into forearm and hand, and decreased function. Insurance/Certification information:  PT Insurance Information: PT BENEFITS 2017 FACILITY/ CIGNA/ EFFECTIVE 16/ ACTIVE/ DED 1000 MET/ OOP 5850/ COPAY 50/ PAYS 80%/ 60 VPCY/ 0 AUTH/ 10-27-17 PAG  Physician Information:  Referring Practitioner: Beverely Pap  Plan of care signed (Y/N): pending    Date of Patient follow up with Physician: 17    G-Code (if applicable):      Date G-Code Applied:  10/30/17       Progress Note: [x]  Yes  []  No  Next due by: Visit #10      Latex Allergy:  [x]NO      []YES  Preferred Language for Healthcare:   [x]English       []other:    Visit # Insurance Allowable   8  2 in 2018 60 VPCY, 0 auth     Pain level:  0-3/10     SUBJECTIVE:  Pt states he is doing well without c/o pain and only minor discomfort with increased work. Overall no changes to report from last session. Does state having minimal increase in shoulder agitation/stiffness this week with weather changes. Denies any pain or soreness after last visit.        OBJECTIVE:   Deferred d/t PO status          ROM PROM AROM  Comment     L R L R     Flexion  140           Abduction             ER  30           IR             Other(cervical)             Other                 Deferred d/t PO status  Strength L R Comment   Flexion  4-       Abduction  4-       ER  3       IR  3-          Deferred d/t PO status  Special Tests quickly. Treatment/Activity Tolerance:  [x] Patient tolerated treatment well [x] Patient limited by fatique  [] Patient limited by pain  [] Patient limited by other medical complications  [] Other:     Patient education:  10/30/17: surgical restrictions/precautions, HEP, icing, sling usage    Prognosis: [x] Good [] Fair  [] Poor    Patient Requires Follow-up: [x] Yes  [] No    PLAN: See eval  [x] Continue per plan of care [] Alter current plan (see comments)  [] Plan of care initiated [] Hold pending MD visit [] Discharge    Electronically signed by: Jeremiah Partida PT, DPT      Torsten Hilton. Dami Win DPT, [id number]  Physical Therapist  [email or web address]

## 2018-01-18 ENCOUNTER — HOSPITAL ENCOUNTER (OUTPATIENT)
Dept: PHYSICAL THERAPY | Age: 58
Discharge: HOME | End: 2018-01-18
Admitting: ORTHOPAEDIC SURGERY

## 2018-01-18 NOTE — FLOWSHEET NOTE
[facility] [employer] [employer]  Orthopaedics and Sports Rehabilitation, Jo Ann Arias       Physical Therapy Daily Treatment Note  Date:  2018    Patient Name:  Perico Paul    :  1960  MRN: [id number]  Restrictions/Precautions:    Medical/Treatment Diagnosis Information:  · Diagnosis: M19.012 Primary OA of L shoulder  //  S/P: L Shoulder Resurfacing Hemiarthroplasty, OBR 10/27/17  · Treatment Diagnosis: Pt presents post-operatively. Currently has significantly limited ROM in all directions, weakness of entire UE, swelling around shoulder and into forearm and hand, and decreased function. Insurance/Certification information:  PT Insurance Information: PT BENEFITS 2018 FACILITY/ [employer]/ EFFECTIVE 18/ ACTIVE/ DED 1000/ COPAY 50/ PAYS 100%/ OOP 6875/ 0 AUTH/ FAX/ 18 PAG/    Physician Information:  Referring Practitioner: Beverely Pap  Plan of care signed (Y/N): pending    Date of Patient follow up with Physician: 18    G-Code (if applicable):      Date G-Code Applied:  10/30/17       Progress Note: [x]  Yes  []  No  Next due by: Visit #10      Latex Allergy:  [x]NO      []YES  Preferred Language for Healthcare:   [x]English       []other:    Visit # Insurance Allowable   8  3 in 2018 60 VPCY, 0 auth     Pain level:  0-3/10     SUBJECTIVE:  Patient states he typically has no c/o of pain in his shoulder, only when reaching overhead. Pain will subside immediately after returning from overhead position. Had no c/o of pain or stiffness in shoulder from previous visit.       OBJECTIVE:   Deferred d/t PO status          ROM PROM AROM  Comment     L R L R     Flexion  140           Abduction             ER  30           IR             Other(cervical)             Other                 Deferred d/t PO status  Strength L R Comment   Flexion  4-       Abduction  4-       ER  3       IR  3-          Deferred d/t PO status  Special Tests Results/Comment   Nolan Stone (21690) Provided verbal/tactile cueing for activities related to improving balance, coordination, kinesthetic sense, posture, motor skill, proprioception  to assist with  scapular, scapulothoracic and UE control with self care, reaching, carrying, lifting, house/yardwork, driving/computer work. Therapeutic Activities:    [x] (87661 or 23499) Provided verbal/tactile cueing for activities related to improving balance, coordination, kinesthetic sense, posture, motor skill, proprioception and motor activation to allow for proper function of scapular, scapulothoracic and UE control with self care, carrying, lifting, driving/computer work.      Home Exercise Program:    [x] (17414) Reviewed/Progressed HEP activities related to strengthening, flexibility, endurance, ROM of scapular, scapulothoracic and UE control with self care, reaching, carrying, lifting, house/yardwork, driving/computer work  [] (05767) Reviewed/Progressed HEP activities related to improving balance, coordination, kinesthetic sense, posture, motor skill, proprioception of scapular, scapulothoracic and UE control with self care, reaching, carrying, lifting, house/yardwork, driving/computer work      Manual Treatments:  PROM / STM / Oscillations-Mobs:  G-I, II, III, IV (PA's, Inf., Post.)  [] (73077) Provided manual therapy to mobilize soft tissue/joints of cervical/CT, scapular GHJ and UE for the purpose of modulating pain, promoting relaxation,  increasing ROM, reducing/eliminating soft tissue swelling/inflammation/restriction, improving soft tissue extensibility and allowing for proper ROM for normal function with self care, reaching, carrying, lifting, house/yardwork, driving/computer work    Modalities: declined    Charges:  Timed Code Treatment Minutes: 30   Total Treatment Minutes: 30       [] EVAL (LOW) 80754 (typically 20 minutes face-to-face)  [] EVAL (MOD) 92590 (typically 30 minutes face-to-face)  [] EVAL (HIGH) 67112 (typically 45 minutes

## 2018-02-01 ENCOUNTER — HOSPITAL ENCOUNTER (OUTPATIENT)
Dept: PHYSICAL THERAPY | Age: 58
Discharge: HOME | End: 2018-02-28
Attending: ORTHOPAEDIC SURGERY | Admitting: ORTHOPAEDIC SURGERY

## 2018-02-07 ENCOUNTER — OFFICE VISIT (OUTPATIENT)
Dept: ORTHOPEDIC SURGERY | Age: 58
End: 2018-02-07

## 2018-02-07 VITALS
SYSTOLIC BLOOD PRESSURE: 105 MMHG | HEART RATE: 59 BPM | DIASTOLIC BLOOD PRESSURE: 67 MMHG | BODY MASS INDEX: 36.82 KG/M2 | WEIGHT: 242.95 LBS | HEIGHT: 68 IN

## 2018-02-07 DIAGNOSIS — Z96.612 STATUS POST LEFT SHOULDER HEMIARTHROPLASTY: ICD-10-CM

## 2018-02-07 DIAGNOSIS — Z96.612 S/P SHOULDER HEMIARTHROPLASTY, LEFT: Primary | ICD-10-CM

## 2018-02-07 PROCEDURE — 99213 OFFICE O/P EST LOW 20 MIN: CPT | Performed by: ORTHOPAEDIC SURGERY

## 2018-02-07 NOTE — PROGRESS NOTES
History of Present Illness:  Pantera Ann is a 49-year-old gentleman here for follow-up regarding his left shoulder. He underwent a left hemiarthroplasty on 10/27/2017. He is currently a bit over 3 months postop. Overall he reports that he is going to physical therapy once every 2-3 weeks. He reports that he's been ahead of schedule this full-time. He also reports that he tries to do his exercises at home however he is unable to do it 3 times a day. He tries to do at least once or twice. Additionally he has been doing curls with increased weights the knee was doing in physical therapy. Patient notes that he was curling 35 pound weights. He denies any specific injuries. He reports that his range of motion is at where he would like to be however he realizes that this is mainly because he is not doing his stretches at home. Medical History:  Patient's medications, allergies, past medical, surgical, social and family histories were reviewed and updated as appropriate. No notes on file    Review of Systems  A 14 point review of systems was completed by the patient on 11/15/17 and is available in the media section of the scanned medical record and was reviewed on 2/7/2018. The review is negative with the exception of those things mentioned in the HPI and Past Medical History    Vital Signs:  Vitals:    02/07/18 1551   BP: 105/67   Pulse: 59       General/Appearance: Alert and oriented and in no apparent distress. Skin:  There are no skin lesions, cellulitis, or extreme edema. The patient has warm and well-perfused Bilateral upper extremities with brisk capillary refill. left Shoulder Exam:  Inspection: His incisions are fully healed, No gross deformities, no signs of infection. Palpation:  No crepitus    Active Range of Motion:  Forward elevation 140° and external rotation of 30°    Passive Range of Motion:  Similar to active    Strength:  4/5 external/internal rotation with resistance    Special Tests:  No Estuardo muscle deformity. Neurovascular: Sensation to light touch is intact, no motor deficits, palpable radial pulses 2+    Assessment :  Mr. Alfred Call is a 59 y.o. yr old patient who underwent a left shoulder hemiarthroplasty on 10/27/2017. He is doing well but trending a bit tight. Impression:  Encounter Diagnoses   Name Primary?  S/P shoulder hemiarthroplasty, left Yes    Status post left shoulder hemiarthroplasty        Office Procedures:  Orders Placed This Encounter   Procedures    Ambulatory referral to Physical Therapy     Referral Priority:   Routine     Referral Type:   Eval and Treat     Referral Reason:   Specialty Services Required     Requested Specialty:   Physical Therapy     Number of Visits Requested:   1       Treatment Plan: At this time we recommend the patient focus on doing his stretches on a daily basis especially with internal rotation and forward elevation we do feel that he can improve on that this time. We will recommend that he continue to with physical therapy although he may change the frequency of this visit depending on his schedule. All his questions were fully answered today. We given a updated physical therapy order also. We will like to see him back in 4-6 weeks for reevaluation. 4:25 PM      Boo Raymundo PA-C  Orthopaedic Sports Medicine Physician Assistant    During this examination, IBoo PA-C, functioned as a scribe for Dr. Aneesh Fuentes. This dictation was performed with a verbal recognition program (DRAGON) and it was checked for errors. It is possible that there are still dictated errors within this office note. If so, please bring any errors to my attention for an addendum.   All efforts were made to ensure that this office note is accurate.  ________________  I, Dr. Aneesh Fuentes, personally performed the services described in this documentation as described by Boo Raymundo PA-C in my presence,

## 2018-02-08 ENCOUNTER — HOSPITAL ENCOUNTER (OUTPATIENT)
Dept: PHYSICAL THERAPY | Age: 58
Discharge: HOME | End: 2018-02-09
Admitting: ORTHOPAEDIC SURGERY

## 2018-02-08 NOTE — FLOWSHEET NOTE
ER/IR 10x10\"    Pendulum             Isometrics           Weight shift     Flexion     Abduction     External Rotation Black, 10x10\"    Internal Rotation     Biceps     Triceps          PRE's     Flexion 3#, x30 S/L   Abduction 3#, x30 S/L    External Rotation     Internal Rotation     Shrugs 12#, x45    EXT     Reverse Flys     Chest Press 8#, x45 plus   Horizontal Abd with ER     Biceps 12#, x45 standing   Triceps 12#, x45 supine   Retraction          Cable Column/Theraband     External Rotation     Internal Rotation     Shrugs     Lats pl5, x30 LUE only in scapular plan   Ext pl6, x30    Flex     Scapular Retraction pl7, x30    BIC     TRIC     PNF          Dynamic Stability          Plyoback          Manual interventions                 Therapeutic Exercise and NMR EXR  [x] (12711) Provided verbal/tactile cueing for activities related to strengthening, flexibility, endurance, ROM  for improvements in scapular, scapulothoracic and UE control with self care, reaching, carrying, lifting, house/yardwork, driving/computer work. [x] (75750) Provided verbal/tactile cueing for activities related to improving balance, coordination, kinesthetic sense, posture, motor skill, proprioception  to assist with  scapular, scapulothoracic and UE control with self care, reaching, carrying, lifting, house/yardwork, driving/computer work. Therapeutic Activities:    [x] (40277 or 05468) Provided verbal/tactile cueing for activities related to improving balance, coordination, kinesthetic sense, posture, motor skill, proprioception and motor activation to allow for proper function of scapular, scapulothoracic and UE control with self care, carrying, lifting, driving/computer work.      Home Exercise Program:    [x] (05084) Reviewed/Progressed HEP activities related to strengthening, flexibility, endurance, ROM of scapular, scapulothoracic and UE control with self care, reaching, carrying, lifting, house/yardwork, driving/computer work  [] (77038) Reviewed/Progressed HEP activities related to improving balance, coordination, kinesthetic sense, posture, motor skill, proprioception of scapular, scapulothoracic and UE control with self care, reaching, carrying, lifting, house/yardwork, driving/computer work      Manual Treatments:  PROM / STM / Oscillations-Mobs:  G-I, II, III, IV (PA's, Inf., Post.)  [] ([id number]) Provided manual therapy to mobilize soft tissue/joints of cervical/CT, scapular GHJ and UE for the purpose of modulating pain, promoting relaxation,  increasing ROM, reducing/eliminating soft tissue swelling/inflammation/restriction, improving soft tissue extensibility and allowing for proper ROM for normal function with self care, reaching, carrying, lifting, house/yardwork, driving/computer work    Modalities: declined    Charges:  Timed Code Treatment Minutes: 60   Total Treatment Minutes: 60       [] EVAL (LOW) 64770 (typically 20 minutes face-to-face)  [] EVAL (MOD) 52412 (typically 30 minutes face-to-face)  [] EVAL (HIGH) 423 8935 (typically 45 minutes face-to-face)  [] RE-EVAL     [x] XI(54253) x  2   [] IONTO  [] NMR (71866) x      [] VASO  [] Manual ([id number]) x       [] Other:  [x] TA x  2    [] Mech Traction (72826)  [] ES(attended) (54793)      [] ES (un) (18504):     GOALS:  Patient stated goal: Return to PLOF unrestricted.      Therapist goals for Patient:   Short Term Goals: To be achieved in: 2 weeks  1. Independent in HEP and progression per patient tolerance, in order to prevent re-injury. MET  2. Patient will have a decrease in pain to facilitate improvement in movement, function, and ADLs as indicated by Functional Deficits. MET     Long Term Goals: To be achieved in: 12 weeks  1. Disability index score of 30% or less for the UEFS to assist with reaching prior level of function. ONGOING  2.  Patient will demonstrate increased AROM to 150 deg flexion to allow for proper joint functioning as indicated by patients Functional Deficits. PARTIALLY MET  3. Patient will demonstrate an increase in Strength to good scapular and core control  for UE to allow for proper functional mobility as indicated by patients Functional Deficits. PARTIALLY MET  4. Patient will return to household functional activities without increased symptoms or restriction. MET  5. Pt will be able to reach behind back without restriction or symptoms. (patient specific functional goal) NOT MET     Progression Towards Functional goals:  [x] Patient is progressing as expected towards functional goals listed. [] Progression is slowed due to complexities listed. [] Progression has been slowed due to co-morbidities. [] Plan just implemented, too soon to assess goals progression  [] Other:     ASSESSMENT:  Patient tolerated increased resistance/reps of current program in addition to progressive shoulder strengthening with S/L sequence without c/o of significant pain or discomfort. However, noted being sore and fatigued at end of today's session. PT updated HEP and reviewed with pt in regard to progression and implementation; pt verbalized understanding with all questions answered. Treatment/Activity Tolerance:  [x] Patient tolerated treatment well [x] Patient limited by fatique  [] Patient limited by pain  [] Patient limited by other medical complications  [] Other:     Patient education:  10/30/17: surgical restrictions/precautions, HEP, icing, sling usage    Prognosis: [x] Good [] Fair  [] Poor    Patient Requires Follow-up: [x] Yes  [] No    PLAN: See eval  [x] Continue per plan of care [] Alter current plan (see comments)  [] Plan of care initiated [] Hold pending MD visit [] Discharge    Electronically signed by: Ethel Almanza PT, DPT      Lui Renee. WIL CuevasT, [id number]  Physical Therapist  [email or web address]

## 2018-02-15 ENCOUNTER — HOSPITAL ENCOUNTER (OUTPATIENT)
Dept: PHYSICAL THERAPY | Age: 58
Discharge: HOME | End: 2018-02-16
Admitting: ORTHOPAEDIC SURGERY

## 2018-02-15 NOTE — FLOWSHEET NOTE
bilaterally                        []Other:     Joint mobility:                         []Normal                                   [x]Hypo                        []Hyper     Palpation: no ttp     Functional Mobility/Transfers: Independent      Posture: fair     Bandages/Dressings/Incisions: anterior incision, mesh covering, no active drainage, no sign of infection     Gait: (include devices/WB status): WNL    RESTRICTIONS/PRECAUTIONS: surgical precautions; CHF, HTN    Exercises/Interventions:   Exercise/Equipment Resistance/Repetitions Other comments   Stretching/PROM     Wall Walk 30x10\" Flexion, Abd   Flexion, ABd   UE [place] 10x10\" ER at 90/90   HADd PostCap 10x10\"    BbIR 10x10\" strap   Sleeper ER/IR 10x10\"    Pendulum             Isometrics           Weight shift     Flexion     Abduction     External Rotation Black, 10x10\"    Internal Rotation     Biceps     Triceps          PRE's     Flexion 5#, x30 S/L   Abduction 5#, x30 S/L    External Rotation     Internal Rotation     Shrugs 12#, x45    EXT     Reverse Flys     Chest Press 12#, x45 plus   Horizontal Abd with ER     Biceps 12#, x45 standing   Triceps 12#, x45 supine   Retraction          Cable Column/Theraband     External Rotation     Internal Rotation     Shrugs     Lats pl5, x45 LUE only in scapular plan   Ext pl6, x45    Flex     Scapular Retraction pl7, x45    BIC     TRIC     PNF          Dynamic Stability     Pushup+ x30 at counter        Plyoback          Manual interventions                 Therapeutic Exercise and NMR EXR  [x] (89905) Provided verbal/tactile cueing for activities related to strengthening, flexibility, endurance, ROM  for improvements in scapular, scapulothoracic and UE control with self care, reaching, carrying, lifting, house/yardwork, driving/computer work.     [x] (05136) Provided verbal/tactile cueing for activities related to improving balance, coordination, kinesthetic sense, posture, motor skill, proprioception  to assist

## 2018-02-19 ENCOUNTER — HOSPITAL ENCOUNTER (OUTPATIENT)
Dept: OTHER | Age: 58
Discharge: HOME | End: 2018-02-28
Attending: INTERNAL MEDICINE | Admitting: INTERNAL MEDICINE

## 2018-02-19 ENCOUNTER — OFFICE VISIT (OUTPATIENT)
Dept: INTERNAL MEDICINE | Age: 58
End: 2018-02-19

## 2018-02-19 VITALS
HEIGHT: 68 IN | BODY MASS INDEX: 37.13 KG/M2 | DIASTOLIC BLOOD PRESSURE: 82 MMHG | SYSTOLIC BLOOD PRESSURE: 110 MMHG | WEIGHT: 245 LBS

## 2018-02-19 DIAGNOSIS — Z12.11 COLON CANCER SCREENING: ICD-10-CM

## 2018-02-19 DIAGNOSIS — E66.8 MODERATE OBESITY: ICD-10-CM

## 2018-02-19 DIAGNOSIS — R73.03 PREDIABETES: ICD-10-CM

## 2018-02-19 DIAGNOSIS — I50.22 CHRONIC SYSTOLIC HEART FAILURE (HCC): ICD-10-CM

## 2018-02-19 DIAGNOSIS — G47.33 OBSTRUCTIVE SLEEP APNEA SYNDROME: ICD-10-CM

## 2018-02-19 DIAGNOSIS — I10 HYPERTENSION, BENIGN: Primary | ICD-10-CM

## 2018-02-19 PROCEDURE — 99214 OFFICE O/P EST MOD 30 MIN: CPT | Performed by: HOSPITALIST

## 2018-02-19 RX ORDER — METOPROLOL TARTRATE 50 MG/1
50 TABLET, FILM COATED ORAL 2 TIMES DAILY
Qty: 180 TABLET | Refills: 3 | Status: SHIPPED | OUTPATIENT
Start: 2018-02-19 | End: 2018-02-20 | Stop reason: ALTCHOICE

## 2018-02-19 ASSESSMENT — PATIENT HEALTH QUESTIONNAIRE - PHQ9
SUM OF ALL RESPONSES TO PHQ9 QUESTIONS 1 & 2: 0
SUM OF ALL RESPONSES TO PHQ QUESTIONS 1-9: 0
2. FEELING DOWN, DEPRESSED OR HOPELESS: 0
1. LITTLE INTEREST OR PLEASURE IN DOING THINGS: 0

## 2018-02-19 NOTE — PROGRESS NOTES
Normal rate, regular rhythm, normal heart sounds and intact distal pulses.    No murmur heard. Pulmonary/Chest: Effort normal and breath sounds normal. No respiratory distress. He has no wheezes. He has no rales. He exhibits no tenderness. Abdominal: Soft. Bowel sounds are normal. He exhibits no distension and no mass. There is no tenderness. There is no rebound and no guarding. No hernia. Abdomen is mildly obese. Musculoskeletal: He exhibits no edema, tenderness or deformity. Mildly reduced range of motion in his left shoulder joint  Lymphadenopathy:     He has no cervical adenopathy. Neurological: He is alert and oriented to person, place, and time. He has normal reflexes. He exhibits normal muscle tone. Coordination normal.   Skin: Skin is warm. He is not diaphoretic. No erythema. He has peeling skin on his both hands. He also has several psoriasis take plaques on his distal half of the right shin   Psychiatric: He has a normal mood and affect. Thought content normal.        Assessment/Plan:     Shivani Elena was seen today for post-op check. Diagnoses and all orders for this visit:    Hypertension, benign       -    Low salt diet; increase aerobic type exercise       -    Weight loss was strongly encouraged       -    Continue current meds    Chronic systolic heart failure (HCC)  -     [facility]  -     ECHO 2D WO Color Doppler Complete; Future  -     Continue current meds  -      Encouraged to adhere to low sodium diet and lose some weight    Moderate obesity         -      Encouraged to reduce calorie intake with meals and increase cardiovascular type exercise    Obstructive sleep apnea syndrome  -     Baseline Diagnostic Sleep Study; Future    Prediabetes        -    Strongly encouraged to lose some weight; adhere to carb control diet; and increase exercise     Colon cancer screening  -     POCT Fecal Immunochemical Test (FIT);  Future    Other orders  -     metoprolol tartrate (LOPRESSOR) 50 MG tablet;  Take 1 tablet by mouth 2 times daily        Follow up in 3 months        Lidia Headley M.D.   2/19/2018, 3:03 PM

## 2018-02-20 ENCOUNTER — OFFICE VISIT (OUTPATIENT)
Dept: CARDIOLOGY CLINIC | Age: 58
End: 2018-02-20

## 2018-02-20 VITALS
BODY MASS INDEX: 37.25 KG/M2 | WEIGHT: 245 LBS | DIASTOLIC BLOOD PRESSURE: 70 MMHG | HEART RATE: 60 BPM | SYSTOLIC BLOOD PRESSURE: 128 MMHG

## 2018-02-20 DIAGNOSIS — I50.32 CHRONIC DIASTOLIC HEART FAILURE (HCC): ICD-10-CM

## 2018-02-20 DIAGNOSIS — I50.22 CHRONIC SYSTOLIC CONGESTIVE HEART FAILURE (HCC): ICD-10-CM

## 2018-02-20 DIAGNOSIS — I10 ESSENTIAL HYPERTENSION: ICD-10-CM

## 2018-02-20 DIAGNOSIS — B33.24 VIRAL CARDIOMYOPATHY (HCC): ICD-10-CM

## 2018-02-20 DIAGNOSIS — I50.22 CHRONIC SYSTOLIC CONGESTIVE HEART FAILURE (HCC): Primary | ICD-10-CM

## 2018-02-20 LAB
ANION GAP SERPL CALCULATED.3IONS-SCNC: 17 MMOL/L (ref 3–16)
BUN BLDV-MCNC: 31 MG/DL (ref 7–20)
CALCIUM SERPL-MCNC: 9.3 MG/DL (ref 8.3–10.6)
CHLORIDE BLD-SCNC: 100 MMOL/L (ref 99–110)
CO2: 25 MMOL/L (ref 21–32)
CREAT SERPL-MCNC: 1.5 MG/DL (ref 0.9–1.3)
GFR AFRICAN AMERICAN: 58
GFR NON-AFRICAN AMERICAN: 48
GLUCOSE BLD-MCNC: 130 MG/DL (ref 70–99)
MAGNESIUM: 2.1 MG/DL (ref 1.8–2.4)
POTASSIUM SERPL-SCNC: 4.5 MMOL/L (ref 3.5–5.1)
PRO-BNP: 126 PG/ML (ref 0–124)
SODIUM BLD-SCNC: 142 MMOL/L (ref 136–145)

## 2018-02-20 PROCEDURE — 99214 OFFICE O/P EST MOD 30 MIN: CPT | Performed by: NURSE PRACTITIONER

## 2018-02-20 RX ORDER — VALSARTAN AND HYDROCHLOROTHIAZIDE 320; 25 MG/1; MG/1
1 TABLET, FILM COATED ORAL DAILY
Qty: 30 TABLET | Refills: 3 | Status: SHIPPED | OUTPATIENT
Start: 2018-02-20 | End: 2018-02-21 | Stop reason: ALTCHOICE

## 2018-02-20 RX ORDER — CARVEDILOL 25 MG/1
25 TABLET ORAL 2 TIMES DAILY WITH MEALS
Qty: 60 TABLET | Refills: 3 | Status: SHIPPED | OUTPATIENT
Start: 2018-02-20 | End: 2018-05-29 | Stop reason: SDUPTHER

## 2018-02-20 ASSESSMENT — ENCOUNTER SYMPTOMS
ABDOMINAL DISTENTION: 1
COUGH: 1
RHINORRHEA: 1

## 2018-02-20 NOTE — PROGRESS NOTES
10/10/2017    RDW 13.6 08/15/2017     10/28/2017     10/10/2017     08/15/2017     BMP:  Lab Results   Component Value Date     11/10/2017     11/01/2017     10/10/2017    K 4.1 11/10/2017    K 4.1 11/01/2017    K 4.3 10/10/2017     11/10/2017    CL 95 11/01/2017     10/10/2017    CO2 27 11/10/2017    CO2 28 11/01/2017    CO2 24 10/10/2017    PHOS 3.0 11/10/2017    PHOS 3.6 11/01/2017    BUN 19 11/10/2017    BUN 20 11/01/2017    BUN 24 10/10/2017    CREATININE 0.9 11/10/2017    CREATININE 0.9 11/01/2017    CREATININE 1.0 10/10/2017     BNP: No results found for: PROBNP     Cardiac Imaging:   MUGA 8/17: EF 47%  Echo 10/20/16   The left ventricular wall thickness appears normal.   The left ventricle appears dilated.   Globally reduced left ventricular systolic function with an estimated   ejection fraction of 25-30%.   Cannot r/o regional wall motion abnormalities.  A false tendon is noted in the left ventricle (normal variant).   There is reversal of E/A inflow velocities across the mitral valve   suggesting impaired left ventricular relaxation.   Mild mitral regurgitation.   Trace pulmonic and tricuspid regurgitation. RVSP 30 mmHg.   Slight improvement in ejection fraction from prior echo on07/27/16. Device: No, had EF recovery  ICD counseling:Yes      VERN Evaulation:  No, PCP referred    Pt Education: The patient has received education on the following topics: dietary sodium restriction, heart failure medications, the importance of physical activity, symptom management and weight monitoring    Assessment:    1. Chronic  congestive heart failure (HCC) - fluid balance positive, increase HCTZ   2. Essential hypertension - increase ARB, stop lopressor, cont coreg, VERN eval   3. Viral cardiomyopathy ([facility] [address] - recovered, cont GMT         Plan:   1. Medications: stop metoprolol, continue coreg 25mg twice a day, increase valsartan-HCT to 320/25mg once day  2.  Labs: today - BNP, BMP  3. Referrals: get sleep apnea referral  4. Lifestyle Recommendations: Weigh yourself every day in the morning after urination, call Fidel Kwong if wt increases 2-3lb in one day or 5lb in one week, Limit sodium to 2000mg/day and fluids to 2L or 64oz/day. Add a fish oil supplement if you are not already taking one.   5. Follow up: 3months with marissa, call sooner if BP remains elevated    CHF Resource Line: [phone]      I appreciate the opportunity of cooperating in the care of this individual.    Kala Gutierrez CNP, 2/20/2018, 3:13 PM    QUALITY MEASURES  1. Tobacco Cessation Counseling: NA  2. Retake of BP if >140/90:   NA  3. Documentation to PCP/referring for new patient:  Sent to PCP at close of office visit  4. CAD patient on anti-platelet: NA  5. CAD patient on STATIN therapy:  NA  6. Patient with CHF and aFib on anticoagulation:  NA   7. Patient Education:  Yes   8. BB for LVSD :  Yes   9. ACE/ARB for LVSD:  Yes   10.  Left Ventricular Ejection Fraction (LVEF) Assessment:  Yes

## 2018-02-20 NOTE — PATIENT INSTRUCTIONS
supplements you take. Taking some medicines together can cause problems. Where can you learn more? Go to [email or web address] and sign in to your [employer] account. Enter H744 in the [facility] box to learn more about \"Learning About Diuretics for High Blood Pressure. \"     If you do not have an account, please click on the \"Sign Up Now\" link. Current as of: September 21, 2016  Content Version: 11.5  © 7441-6150 Healthwise, Incorporated. Care instructions adapted under license by [facility] ([facility]). If you have questions about a medical condition or this instruction, always ask your healthcare professional. [address] any warranty or liability for your use of this information.

## 2018-02-21 DIAGNOSIS — I50.22 CHRONIC SYSTOLIC CONGESTIVE HEART FAILURE (HCC): ICD-10-CM

## 2018-02-21 RX ORDER — VALSARTAN AND HYDROCHLOROTHIAZIDE 160; 12.5 MG/1; MG/1
1 TABLET, FILM COATED ORAL DAILY
Qty: 30 TABLET | Refills: 3
Start: 2018-02-21 | End: 2018-04-19 | Stop reason: SDUPTHER

## 2018-02-22 ENCOUNTER — HOSPITAL ENCOUNTER (OUTPATIENT)
Dept: PHYSICAL THERAPY | Age: 58
Discharge: HOME | End: 2018-02-23
Admitting: ORTHOPAEDIC SURGERY

## 2018-02-22 NOTE — FLOWSHEET NOTE
equal and normal bilaterally                        []Other:     Joint mobility:                         []Normal                                   [x]Hypo                        []Hyper     Palpation: no ttp     Functional Mobility/Transfers: Independent      Posture: fair     Bandages/Dressings/Incisions: anterior incision, mesh covering, no active drainage, no sign of infection     Gait: (include devices/WB status): WNL    RESTRICTIONS/PRECAUTIONS: surgical precautions; CHF, HTN    Exercises/Interventions:   Exercise/Equipment Resistance/Repetitions Other comments   Stretching/PROM     Wall Walk 30x10\" Flexion, Abd   Flexion, ABd   UE [place] 10x10\" ER at 90/90   HADd PostCap 10x10\"    BbIR 10x10\" strap   Sleeper ER/IR 10x10\"    Pendulum             Isometrics           Weight shift     Flexion     Abduction     External Rotation Black, 10x10\"    Internal Rotation     Biceps     Triceps          PRE's     Flexion 5#, x45 S/L   Abduction 5#, x45 S/L    External Rotation 3#, x30    Internal Rotation     Shrugs 12#, x45    EXT     Reverse Flys     Chest Press 12#, x45 plus   Horizontal Abd with ER     Biceps 12#, x45 standing   Triceps 12#, x45 supine   Retraction          Cable Column/Theraband     External Rotation     Internal Rotation     Shrugs     Lats pl5, x45 LUE only in scapular plan   Ext pl6, x45    Flex     Scapular Retraction pl7, x45    BIC     TRIC     PNF          Dynamic Stability     Pushup+ x45 at counter   BoW 3x30\" Cw/CCw        Plyoback          Manual interventions                 Therapeutic Exercise and NMR EXR  [x] (37647) Provided verbal/tactile cueing for activities related to strengthening, flexibility, endurance, ROM  for improvements in scapular, scapulothoracic and UE control with self care, reaching, carrying, lifting, house/yardwork, driving/computer work.     [x] (85088) Provided verbal/tactile cueing for activities related to improving balance, coordination, kinesthetic sense, toby  [] Patient limited by pain  [] Patient limited by other medical complications  [] Other:     Patient education:  10/30/17: surgical restrictions/precautions, HEP, icing, sling usage    Prognosis: [x] Good [] Fair  [] Poor    Patient Requires Follow-up: [x] Yes  [] No    PLAN: See eval  [x] Continue per plan of care [] Alter current plan (see comments)  [] Plan of care initiated [] Hold pending MD visit [] Discharge    Electronically signed by: Ignacia Aiken PT, DPT      Ramón Burgos. Johnny Kitchen DPT, [id number]  Physical Therapist  [email or web address]

## 2018-03-01 ENCOUNTER — HOSPITAL ENCOUNTER (OUTPATIENT)
Dept: PHYSICAL THERAPY | Age: 58
Discharge: HOME | End: 2018-03-02
Admitting: ORTHOPAEDIC SURGERY

## 2018-03-01 ENCOUNTER — HOSPITAL ENCOUNTER (OUTPATIENT)
Dept: PHYSICAL THERAPY | Age: 58
Discharge: HOME | End: 2018-03-31
Attending: ORTHOPAEDIC SURGERY | Admitting: ORTHOPAEDIC SURGERY

## 2018-03-01 ENCOUNTER — HOSPITAL ENCOUNTER (OUTPATIENT)
Dept: OTHER | Age: 58
Discharge: HOME | End: 2018-03-31
Attending: INTERNAL MEDICINE | Admitting: INTERNAL MEDICINE

## 2018-03-01 NOTE — FLOWSHEET NOTE
kinesthetic sense, posture, motor skill, proprioception  to assist with  scapular, scapulothoracic and UE control with self care, reaching, carrying, lifting, house/yardwork, driving/computer work. Therapeutic Activities:    [x] (72722 or 89931) Provided verbal/tactile cueing for activities related to improving balance, coordination, kinesthetic sense, posture, motor skill, proprioception and motor activation to allow for proper function of scapular, scapulothoracic and UE control with self care, carrying, lifting, driving/computer work.      Home Exercise Program:    [x] (54041) Reviewed/Progressed HEP activities related to strengthening, flexibility, endurance, ROM of scapular, scapulothoracic and UE control with self care, reaching, carrying, lifting, house/yardwork, driving/computer work  [] (60972) Reviewed/Progressed HEP activities related to improving balance, coordination, kinesthetic sense, posture, motor skill, proprioception of scapular, scapulothoracic and UE control with self care, reaching, carrying, lifting, house/yardwork, driving/computer work      Manual Treatments:  PROM / STM / Oscillations-Mobs:  G-I, II, III, IV (PA's, Inf., Post.)  [] (81197) Provided manual therapy to mobilize soft tissue/joints of cervical/CT, scapular GHJ and UE for the purpose of modulating pain, promoting relaxation,  increasing ROM, reducing/eliminating soft tissue swelling/inflammation/restriction, improving soft tissue extensibility and allowing for proper ROM for normal function with self care, reaching, carrying, lifting, house/yardwork, driving/computer work    Modalities: declined    Charges:  Timed Code Treatment Minutes: 80   Total Treatment Minutes: 80       [] EVAL (LOW) 43236 (typically 20 minutes face-to-face)  [] EVAL (MOD) 18564 (typically 30 minutes face-to-face)  [] EVAL (HIGH) 75692 (typically 45 minutes face-to-face)  [] RE-EVAL     [x] HC(03875) x  2   [] IONTO  [] NMR (21131) x      [] VASO  [] Treatment/Activity Tolerance:  [x] Patient tolerated treatment well [x] Patient limited by fatique  [] Patient limited by pain  [] Patient limited by other medical complications  [] Other:     Patient education:  10/30/17: surgical restrictions/precautions, HEP, icing, sling usage    Prognosis: [x] Good [] Fair  [] Poor    Patient Requires Follow-up: [x] Yes  [] No    PLAN: See eval  [x] Continue per plan of care [] Alter current plan (see comments)  [] Plan of care initiated [] Hold pending MD visit [] Discharge    Electronically signed by: Lidia Villalta PT, DPT      Stalin May. Elder Alfaro DPT, [id number]  Physical Therapist  [email or web address]

## 2018-03-15 ENCOUNTER — HOSPITAL ENCOUNTER (OUTPATIENT)
Dept: PHYSICAL THERAPY | Age: 58
Discharge: HOME | End: 2018-03-16
Admitting: ORTHOPAEDIC SURGERY

## 2018-03-15 NOTE — FLOWSHEET NOTE
related to improving balance, coordination, kinesthetic sense, posture, motor skill, proprioception  to assist with  scapular, scapulothoracic and UE control with self care, reaching, carrying, lifting, house/yardwork, driving/computer work. Therapeutic Activities:    [x] (41253 or 94003) Provided verbal/tactile cueing for activities related to improving balance, coordination, kinesthetic sense, posture, motor skill, proprioception and motor activation to allow for proper function of scapular, scapulothoracic and UE control with self care, carrying, lifting, driving/computer work.      Home Exercise Program:    [x] (88694) Reviewed/Progressed HEP activities related to strengthening, flexibility, endurance, ROM of scapular, scapulothoracic and UE control with self care, reaching, carrying, lifting, house/yardwork, driving/computer work  [] (31948) Reviewed/Progressed HEP activities related to improving balance, coordination, kinesthetic sense, posture, motor skill, proprioception of scapular, scapulothoracic and UE control with self care, reaching, carrying, lifting, house/yardwork, driving/computer work      Manual Treatments:  PROM / STM / Oscillations-Mobs:  G-I, II, III, IV (PA's, Inf., Post.)  [] (15305) Provided manual therapy to mobilize soft tissue/joints of cervical/CT, scapular GHJ and UE for the purpose of modulating pain, promoting relaxation,  increasing ROM, reducing/eliminating soft tissue swelling/inflammation/restriction, improving soft tissue extensibility and allowing for proper ROM for normal function with self care, reaching, carrying, lifting, house/yardwork, driving/computer work    Modalities: declined    Charges:  Timed Code Treatment Minutes: 100   Total Treatment Minutes: 100       [] EVAL (LOW) 00718 (typically 20 minutes face-to-face)  [] EVAL (MOD) 85510 (typically 30 minutes face-to-face)  [] EVAL (HIGH) 77010 (typically 45 minutes face-to-face)  [] RE-EVAL     [x] CG(81194) x  2   [] cable column exercises. PT updated HEP; pt verbalized understanding c questions answered. Treatment/Activity Tolerance:  [x] Patient tolerated treatment well [x] Patient limited by fatique  [] Patient limited by pain  [] Patient limited by other medical complications  [] Other:     Patient education:  10/30/17: surgical restrictions/precautions, HEP, icing, sling usage    Prognosis: [x] Good [] Fair  [] Poor    Patient Requires Follow-up: [x] Yes  [] No    PLAN: See eval  [x] Continue per plan of care [] Alter current plan (see comments)  [] Plan of care initiated [] Hold pending MD visit [] Discharge    Electronically signed by: Tomeka Smith PT, DPT  BKAARI Gao   Therapist was present, directed the patient's care, made skilled judgement, and was  responsible for assessment and treatment of the patient. Leslie Esqueda. Erik Soto, WILT, [id number]  Physical Therapist  [email or web address]

## 2018-03-22 ENCOUNTER — HOSPITAL ENCOUNTER (OUTPATIENT)
Dept: PHYSICAL THERAPY | Age: 58
Discharge: HOME | End: 2018-03-23
Admitting: ORTHOPAEDIC SURGERY

## 2018-03-22 NOTE — FLOWSHEET NOTE
[facility]  Orthopaedics and Sports Rehabilitation, Bora Gallo      Physical Therapy Daily Treatment Note  Date:  3/22/2018    Patient Name:  Tami Hawkins    :  1960  MRN: [id number]  Restrictions/Precautions:    Medical/Treatment Diagnosis Information:  · Diagnosis: M19.012 Primary OA of L shoulder  //  S/P: L Shoulder Resurfacing Hemiarthroplasty, OBR 10/27/17  · Treatment Diagnosis: Pt presents post-operatively. Currently has significantly limited ROM in all directions, weakness of entire UE, swelling around shoulder and into forearm and hand, and decreased function. Insurance/Certification information:  PT Insurance Information: PT BENEFITS 2018 FACILITY/ [employer]/ EFFECTIVE 18/ ACTIVE/ DED 1000/ COPAY 50/ PAYS 100%/ OOP 6875/ 0 AUTH/ FAX/ 18 PAG/    Physician Information:  Referring Practitioner: Tommas Gosselin  Plan of care signed (Y/N): pending    Date of Patient follow up with Physician: 18    G-Code (if applicable):      Date G-Code Applied:  10/30/17       Progress Note: [x]  Yes  []  No  Next due by: Visit #10      Latex Allergy:  [x]NO      []YES  Preferred Language for Healthcare:   [x]English       []other:    Visit # Insurance Allowable   14  9 in 2018 60 VPCY, 0 auth     Pain level:  0/10     SUBJECTIVE:  Pt is 21 WEEKS PO. Patient reports feeling sore for a few days after last tx. Still has difficulty lifting arm at times. Reports completing HEP ~1x/day more stretching than strengthening due to stiffness.      OBJECTIVE:   Deferred d/t PO status          ROM PROM AROM  Comment     L R L R     Flexion  140           Abduction  135           ER  80           IR  20           Other(cervical)             Other                   Strength L R Comment   Flexion  4+       Abduction  4+       ER  4       IR  4            Reflexes/Sensation:                         [x]Dermatomes/Myotomes intact                         [x]Reflexes equal and normal bilaterally skill, proprioception  to assist with  scapular, scapulothoracic and UE control with self care, reaching, carrying, lifting, house/yardwork, driving/computer work. Therapeutic Activities:    [x] (79919 or 38649) Provided verbal/tactile cueing for activities related to improving balance, coordination, kinesthetic sense, posture, motor skill, proprioception and motor activation to allow for proper function of scapular, scapulothoracic and UE control with self care, carrying, lifting, driving/computer work.      Home Exercise Program:    [x] (84306) Reviewed/Progressed HEP activities related to strengthening, flexibility, endurance, ROM of scapular, scapulothoracic and UE control with self care, reaching, carrying, lifting, house/yardwork, driving/computer work  [] (47959) Reviewed/Progressed HEP activities related to improving balance, coordination, kinesthetic sense, posture, motor skill, proprioception of scapular, scapulothoracic and UE control with self care, reaching, carrying, lifting, house/yardwork, driving/computer work      Manual Treatments:  PROM / STM / Oscillations-Mobs:  G-I, II, III, IV (PA's, Inf., Post.)  [] (71348) Provided manual therapy to mobilize soft tissue/joints of cervical/CT, scapular GHJ and UE for the purpose of modulating pain, promoting relaxation,  increasing ROM, reducing/eliminating soft tissue swelling/inflammation/restriction, improving soft tissue extensibility and allowing for proper ROM for normal function with self care, reaching, carrying, lifting, house/yardwork, driving/computer work    Modalities: declined    Charges:  Timed Code Treatment Minutes: 90   Total Treatment Minutes: 90       [] EVAL (LOW) 03317 (typically 20 minutes face-to-face)  [] EVAL (MOD) 52290 (typically 30 minutes face-to-face)  [] EVAL (HIGH) 59084 (typically 45 minutes face-to-face)  [] RE-EVAL     [x] LP(22628) x  2   [] IONTO  [x] NMR (54361) x  1   [] VASO  [] Manual (12223) x       [] Other:  [x]

## 2018-03-29 ENCOUNTER — HOSPITAL ENCOUNTER (OUTPATIENT)
Dept: PHYSICAL THERAPY | Age: 58
Discharge: HOME | End: 2018-03-30
Admitting: ORTHOPAEDIC SURGERY

## 2018-03-29 NOTE — FLOWSHEET NOTE
scapulothoracic and UE control with self care, reaching, carrying, lifting, house/yardwork, driving/computer work. Therapeutic Activities:    [x] (79798 or 51244) Provided verbal/tactile cueing for activities related to improving balance, coordination, kinesthetic sense, posture, motor skill, proprioception and motor activation to allow for proper function of scapular, scapulothoracic and UE control with self care, carrying, lifting, driving/computer work.      Home Exercise Program:    [x] (50364) Reviewed/Progressed HEP activities related to strengthening, flexibility, endurance, ROM of scapular, scapulothoracic and UE control with self care, reaching, carrying, lifting, house/yardwork, driving/computer work  [] (93432) Reviewed/Progressed HEP activities related to improving balance, coordination, kinesthetic sense, posture, motor skill, proprioception of scapular, scapulothoracic and UE control with self care, reaching, carrying, lifting, house/yardwork, driving/computer work      Manual Treatments:  PROM / STM / Oscillations-Mobs:  G-I, II, III, IV (PA's, Inf., Post.)  [] (31049) Provided manual therapy to mobilize soft tissue/joints of cervical/CT, scapular GHJ and UE for the purpose of modulating pain, promoting relaxation,  increasing ROM, reducing/eliminating soft tissue swelling/inflammation/restriction, improving soft tissue extensibility and allowing for proper ROM for normal function with self care, reaching, carrying, lifting, house/yardwork, driving/computer work    Modalities: declined    Charges:  Timed Code Treatment Minutes: 65   Total Treatment Minutes: 65       [] EVAL (LOW) 84026 (typically 20 minutes face-to-face)  [] EVAL (MOD) 29912 (typically 30 minutes face-to-face)  [] EVAL (HIGH) 36767 (typically 45 minutes face-to-face)  [] RE-EVAL     [x] WE(76205) x  2   [] IONTO  [x] NMR (24732) x  1   [] VASO  [] Manual (06847) x       [] Other:  [x] TA x  1    [] Mech Traction (67528)   [] ES(attended) (53547)      [] ES (un) (40154):     GOALS:  Patient stated goal: Return to PLOF unrestricted.      Therapist goals for Patient:   Short Term Goals: To be achieved in: 2 weeks  1. Independent in HEP and progression per patient tolerance, in order to prevent re-injury. MET  2. Patient will have a decrease in pain to facilitate improvement in movement, function, and ADLs as indicated by Functional Deficits. MET     Long Term Goals: To be achieved in: 12 weeks  1. Disability index score of 30% or less for the UEFS to assist with reaching prior level of function. ONGOING  2. Patient will demonstrate increased AROM to 150 deg flexion to allow for proper joint functioning as indicated by patients Functional Deficits. PARTIALLY MET  3. Patient will demonstrate an increase in Strength to good scapular and core control  for UE to allow for proper functional mobility as indicated by patients Functional Deficits. PARTIALLY MET  4. Patient will return to household functional activities without increased symptoms or restriction. MET  5. Pt will be able to reach behind back without restriction or symptoms. (patient specific functional goal) NOT MET     Progression Towards Functional goals:  [x] Patient is progressing as expected towards functional goals listed. [] Progression is slowed due to complexities listed. [] Progression has been slowed due to co-morbidities. [] Plan just implemented, too soon to assess goals progression  [] Other:     ASSESSMENT:  Patient tolerated treatment well c good carryover of exercises. Able to increase exercises c increased reps and resistance. Pt req tactile cues c S/L ER at post delt to prevent muscular compensation and rotation. Changes made to current exercises to incorporate functional movements, such as scaption, D2 flexion, and standing hor Abd c band. Pt improved overall in strength and ROM, however expresses frustration that he is not at his PLOF.  Pt needs freq

## 2018-04-01 ENCOUNTER — HOSPITAL ENCOUNTER (OUTPATIENT)
Dept: PHYSICAL THERAPY | Age: 58
Discharge: HOME | End: 2018-04-30
Attending: ORTHOPAEDIC SURGERY | Admitting: ORTHOPAEDIC SURGERY

## 2018-04-23 RX ORDER — VALSARTAN AND HYDROCHLOROTHIAZIDE 160; 12.5 MG/1; MG/1
TABLET, FILM COATED ORAL
Qty: 60 TABLET | Refills: 5 | Status: SHIPPED | OUTPATIENT
Start: 2018-04-23 | End: 2018-05-29 | Stop reason: SDUPTHER

## 2018-04-26 ENCOUNTER — HOSPITAL ENCOUNTER (OUTPATIENT)
Dept: PHYSICAL THERAPY | Age: 58
Discharge: HOME | End: 2018-04-27
Admitting: ORTHOPAEDIC SURGERY

## 2018-05-01 ENCOUNTER — HOSPITAL ENCOUNTER (OUTPATIENT)
Dept: PHYSICAL THERAPY | Age: 58
Discharge: HOME | End: 2018-05-31
Attending: ORTHOPAEDIC SURGERY | Admitting: ORTHOPAEDIC SURGERY

## 2018-05-02 ENCOUNTER — OFFICE VISIT (OUTPATIENT)
Dept: ORTHOPEDIC SURGERY | Age: 58
End: 2018-05-02

## 2018-05-02 VITALS
WEIGHT: 245 LBS | DIASTOLIC BLOOD PRESSURE: 82 MMHG | BODY MASS INDEX: 37.13 KG/M2 | SYSTOLIC BLOOD PRESSURE: 156 MMHG | HEART RATE: 58 BPM | HEIGHT: 68 IN

## 2018-05-02 DIAGNOSIS — Z96.612 S/P SHOULDER HEMIARTHROPLASTY, LEFT: Primary | ICD-10-CM

## 2018-05-02 DIAGNOSIS — Z96.612 STATUS POST LEFT SHOULDER HEMIARTHROPLASTY: ICD-10-CM

## 2018-05-02 DIAGNOSIS — M25.512 LEFT SHOULDER PAIN, UNSPECIFIED CHRONICITY: ICD-10-CM

## 2018-05-02 PROCEDURE — 99213 OFFICE O/P EST LOW 20 MIN: CPT | Performed by: ORTHOPAEDIC SURGERY

## 2018-05-21 ENCOUNTER — OFFICE VISIT (OUTPATIENT)
Dept: INTERNAL MEDICINE | Age: 58
End: 2018-05-21

## 2018-05-21 VITALS
SYSTOLIC BLOOD PRESSURE: 120 MMHG | BODY MASS INDEX: 37.13 KG/M2 | WEIGHT: 245 LBS | DIASTOLIC BLOOD PRESSURE: 82 MMHG | HEIGHT: 68 IN

## 2018-05-21 DIAGNOSIS — I10 HYPERTENSION, BENIGN: ICD-10-CM

## 2018-05-21 DIAGNOSIS — E66.8 MODERATE OBESITY: ICD-10-CM

## 2018-05-21 DIAGNOSIS — G47.33 OBSTRUCTIVE SLEEP APNEA SYNDROME: ICD-10-CM

## 2018-05-21 DIAGNOSIS — N18.30 CKD (CHRONIC KIDNEY DISEASE) STAGE 3, GFR 30-59 ML/MIN (HCC): ICD-10-CM

## 2018-05-21 DIAGNOSIS — I50.22 CHRONIC SYSTOLIC HEART FAILURE (HCC): ICD-10-CM

## 2018-05-21 DIAGNOSIS — I10 HYPERTENSION, BENIGN: Primary | ICD-10-CM

## 2018-05-21 LAB
ALBUMIN SERPL-MCNC: 4.5 G/DL (ref 3.4–5)
ANION GAP SERPL CALCULATED.3IONS-SCNC: 14 MMOL/L (ref 3–16)
BUN BLDV-MCNC: 26 MG/DL (ref 7–20)
CALCIUM SERPL-MCNC: 9.5 MG/DL (ref 8.3–10.6)
CHLORIDE BLD-SCNC: 99 MMOL/L (ref 99–110)
CO2: 28 MMOL/L (ref 21–32)
CREAT SERPL-MCNC: 1.2 MG/DL (ref 0.9–1.3)
GFR AFRICAN AMERICAN: >60
GFR NON-AFRICAN AMERICAN: >60
GLUCOSE BLD-MCNC: 134 MG/DL (ref 70–99)
PHOSPHORUS: 2.9 MG/DL (ref 2.5–4.9)
POTASSIUM SERPL-SCNC: 5.2 MMOL/L (ref 3.5–5.1)
SODIUM BLD-SCNC: 141 MMOL/L (ref 136–145)

## 2018-05-21 PROCEDURE — 99214 OFFICE O/P EST MOD 30 MIN: CPT | Performed by: HOSPITALIST

## 2018-05-21 ASSESSMENT — PATIENT HEALTH QUESTIONNAIRE - PHQ9
SUM OF ALL RESPONSES TO PHQ9 QUESTIONS 1 & 2: 0
SUM OF ALL RESPONSES TO PHQ QUESTIONS 1-9: 0
1. LITTLE INTEREST OR PLEASURE IN DOING THINGS: 0
2. FEELING DOWN, DEPRESSED OR HOPELESS: 0

## 2018-05-29 ENCOUNTER — OFFICE VISIT (OUTPATIENT)
Dept: CARDIOLOGY CLINIC | Age: 58
End: 2018-05-29

## 2018-05-29 VITALS
BODY MASS INDEX: 37.56 KG/M2 | OXYGEN SATURATION: 97 % | WEIGHT: 247 LBS | HEART RATE: 65 BPM | SYSTOLIC BLOOD PRESSURE: 128 MMHG | DIASTOLIC BLOOD PRESSURE: 70 MMHG

## 2018-05-29 DIAGNOSIS — I50.32 CHRONIC DIASTOLIC HEART FAILURE (HCC): ICD-10-CM

## 2018-05-29 DIAGNOSIS — I50.22 CHRONIC SYSTOLIC CONGESTIVE HEART FAILURE (HCC): ICD-10-CM

## 2018-05-29 DIAGNOSIS — B33.24 VIRAL CARDIOMYOPATHY (HCC): ICD-10-CM

## 2018-05-29 DIAGNOSIS — I50.22 CHRONIC SYSTOLIC CONGESTIVE HEART FAILURE (HCC): Primary | ICD-10-CM

## 2018-05-29 DIAGNOSIS — I10 HYPERTENSION, BENIGN: ICD-10-CM

## 2018-05-29 PROCEDURE — 99213 OFFICE O/P EST LOW 20 MIN: CPT | Performed by: NURSE PRACTITIONER

## 2018-05-29 RX ORDER — VALSARTAN AND HYDROCHLOROTHIAZIDE 160; 12.5 MG/1; MG/1
1 TABLET, FILM COATED ORAL DAILY
Qty: 90 TABLET | Refills: 3 | Status: SHIPPED | OUTPATIENT
Start: 2018-05-29 | End: 2018-11-26 | Stop reason: SDUPTHER

## 2018-05-29 RX ORDER — CARVEDILOL 25 MG/1
25 TABLET ORAL 2 TIMES DAILY WITH MEALS
Qty: 180 TABLET | Refills: 3 | Status: SHIPPED | OUTPATIENT
Start: 2018-05-29 | End: 2019-05-24 | Stop reason: SDUPTHER

## 2018-05-29 ASSESSMENT — ENCOUNTER SYMPTOMS
RESPIRATORY NEGATIVE: 1
RHINORRHEA: 1
ABDOMINAL DISTENTION: 1

## 2018-06-05 ENCOUNTER — TELEPHONE (OUTPATIENT)
Dept: INTERNAL MEDICINE | Age: 58
End: 2018-06-05

## 2018-06-05 RX ORDER — AMOXICILLIN 500 MG/1
1 TABLET, FILM COATED ORAL 3 TIMES DAILY
Qty: 21 TABLET | Refills: 0 | Status: SHIPPED | OUTPATIENT
Start: 2018-06-05 | End: 2018-06-12

## 2018-08-02 ENCOUNTER — TELEPHONE (OUTPATIENT)
Dept: INTERNAL MEDICINE | Age: 58
End: 2018-08-02

## 2018-08-02 DIAGNOSIS — R21 SKIN RASH: ICD-10-CM

## 2018-08-02 RX ORDER — CLOBETASOL PROPIONATE 0.5 MG/G
OINTMENT TOPICAL
Qty: 60 G | Refills: 2 | Status: SHIPPED | OUTPATIENT
Start: 2018-08-02 | End: 2019-07-17 | Stop reason: SDUPTHER

## 2018-11-26 RX ORDER — VALSARTAN AND HYDROCHLOROTHIAZIDE 160; 12.5 MG/1; MG/1
2 TABLET, FILM COATED ORAL DAILY
Qty: 180 TABLET | Refills: 3 | Status: SHIPPED | OUTPATIENT
Start: 2018-11-26 | End: 2019-01-14 | Stop reason: ALTCHOICE

## 2018-11-27 ENCOUNTER — TELEPHONE (OUTPATIENT)
Dept: CARDIOLOGY CLINIC | Age: 58
End: 2018-11-27

## 2018-11-27 ENCOUNTER — TELEPHONE (OUTPATIENT)
Dept: INTERNAL MEDICINE CLINIC | Age: 58
End: 2018-11-27

## 2018-11-27 DIAGNOSIS — N20.0 KIDNEY STONE: Primary | ICD-10-CM

## 2018-11-29 ENCOUNTER — OFFICE VISIT (OUTPATIENT)
Dept: CARDIOLOGY CLINIC | Age: 58
End: 2018-11-29
Payer: COMMERCIAL

## 2018-11-29 VITALS
HEART RATE: 63 BPM | BODY MASS INDEX: 37.25 KG/M2 | DIASTOLIC BLOOD PRESSURE: 88 MMHG | WEIGHT: 245 LBS | SYSTOLIC BLOOD PRESSURE: 122 MMHG

## 2018-11-29 DIAGNOSIS — R94.39 ABNORMAL MYOCARDIAL PERFUSION STUDY: ICD-10-CM

## 2018-11-29 DIAGNOSIS — B33.24 VIRAL CARDIOMYOPATHY (HCC): Primary | ICD-10-CM

## 2018-11-29 DIAGNOSIS — I10 HYPERTENSION, BENIGN: ICD-10-CM

## 2018-11-29 DIAGNOSIS — I50.22 CHRONIC SYSTOLIC HEART FAILURE (HCC): ICD-10-CM

## 2018-11-29 PROCEDURE — 99214 OFFICE O/P EST MOD 30 MIN: CPT | Performed by: NURSE PRACTITIONER

## 2018-11-29 ASSESSMENT — ENCOUNTER SYMPTOMS
RHINORRHEA: 1
RESPIRATORY NEGATIVE: 1
ABDOMINAL DISTENTION: 1

## 2018-11-29 NOTE — PROGRESS NOTES
Lipoma; Other drug allergy(995.27); Psoriasis; Thyroid disease; and Unspecified sleep apnea. Surgical History:   has a past surgical history that includes Tonsillectomy; hernia repair; lipoma resection; joint replacement (10/27/2017); and other surgical history (Left, 10/27/2017). Social History:   reports that he has never smoked. He has never used smokeless tobacco. He reports that he drinks alcohol. He reports that he does not use drugs. Family History:   Family History   Problem Relation Age of Onset    Other Mother         DJD    Other Father         idiopathic cardiomyopathy       Home Medications:  Prior to Admission medications    Medication Sig Start Date End Date Taking? Authorizing Provider   valsartan-hydrochlorothiazide (DIOVAN-HCT) 160-12.5 MG per tablet Take 2 tablets by mouth daily 11/26/18   MYKEL Alvarado CNP   clobetasol (TEMOVATE) 0.05 % ointment Apply topically 2 times daily.  8/2/18   Teodoro Uriostegui MD   carvedilol (COREG) 25 MG tablet Take 1 tablet by mouth 2 times daily (with meals) 5/29/18   MYKEL Alvarado CNP   aspirin 325 MG EC tablet Take 1 tablet by mouth 2 times daily 10/28/17   Bina Calero MD   Zinc Sulfate (ZINC 15 PO) Take by mouth    Historical Provider, MD   vitamin E 400 UNIT capsule Take 400 Units by mouth daily    Historical Provider, MD   Coenzyme Q10 (COQ10) 100 MG CAPS Take 4 tablets by mouth     Historical Provider, MD   Multiple Vitamins-Minerals (THERAPEUTIC MULTIVITAMIN-MINERALS) tablet Take 1 tablet by mouth daily    Historical Provider, MD   Glucosamine-Chondroit-Vit C-Mn (GLUCOSAMINE 1500 COMPLEX PO) Take by mouth    Historical Provider, MD   Omega-3 Fatty Acids (FISH OIL) 1200 MG CAPS Take by mouth 3 tablets once a week    Historical Provider, MD   B Complex Vitamins (VITAMIN-B COMPLEX) TABS Take by mouth    Historical Provider, MD   magnesium citrate solution Take 296 mLs by mouth once    Historical Provider, MD   vitamin D 10/10/2017    HGB 14.2 08/15/2017    HCT 34.6 10/28/2017    HCT 40.7 10/10/2017    HCT 40.7 08/15/2017    MCV 89.0 10/28/2017    MCV 88.5 10/10/2017    MCV 87.8 08/15/2017    RDW 13.1 10/28/2017    RDW 13.3 10/10/2017    RDW 13.6 08/15/2017     10/28/2017     10/10/2017     08/15/2017     BMP:  Lab Results   Component Value Date     05/21/2018     02/20/2018     11/10/2017    K 5.2 05/21/2018    K 4.5 02/20/2018    K 4.1 11/10/2017    CL 99 05/21/2018     02/20/2018     11/10/2017    CO2 28 05/21/2018    CO2 25 02/20/2018    CO2 27 11/10/2017    PHOS 2.9 05/21/2018    PHOS 3.0 11/10/2017    PHOS 3.6 11/01/2017    BUN 26 05/21/2018    BUN 31 02/20/2018    BUN 19 11/10/2017    CREATININE 1.2 05/21/2018    CREATININE 1.5 02/20/2018    CREATININE 0.9 11/10/2017     BNP:   Lab Results   Component Value Date    PROBNP 126 02/20/2018           VERN Evaulation:  No, PCP referred - not done yet    Pt Education: The patient has received education on the following topics: dietary sodium restriction, heart failure medications, the importance of physical activity, symptom management and weight monitoring    Assessment/Plan:    Encounter Diagnoses        Hypertension, benign controlled    Viral cardiomyopathy ([address] On GDT, recheck echo    Chronic systolic heart failure (HCC) compensated    Abnormal myocardial perfusion study Coronary calcium scoring, check lipids       Intructions:   1. Medications:continue current meds   2. Labs: [facility] for lipid particle size, other fasting labs  3. Referrals: echo, calcium CT score  4. Lifestyle Recommendations: Weigh yourself every day in the morning after urination, call Eleni Voss if wt increases 2-3lb in one day or 5lb in one week, Limit sodium to 2000mg/day and fluids to 2L or 64oz/day.  Add a fish oil supplement if you are not already taking one.   5. Follow up: after testing    CHF Resource Line: [phone]      I appreciate the

## 2018-11-30 DIAGNOSIS — I10 HYPERTENSION, BENIGN: ICD-10-CM

## 2018-11-30 DIAGNOSIS — B33.24 VIRAL CARDIOMYOPATHY (HCC): ICD-10-CM

## 2018-11-30 DIAGNOSIS — I50.22 CHRONIC SYSTOLIC HEART FAILURE (HCC): ICD-10-CM

## 2018-11-30 LAB
A/G RATIO: 1.7 (ref 1.1–2.2)
ALBUMIN SERPL-MCNC: 4.5 G/DL (ref 3.4–5)
ALP BLD-CCNC: 97 U/L (ref 40–129)
ALT SERPL-CCNC: 24 U/L (ref 10–40)
ANION GAP SERPL CALCULATED.3IONS-SCNC: 14 MMOL/L (ref 3–16)
AST SERPL-CCNC: 18 U/L (ref 15–37)
BILIRUB SERPL-MCNC: 0.3 MG/DL (ref 0–1)
BUN BLDV-MCNC: 29 MG/DL (ref 7–20)
C-REACTIVE PROTEIN, HIGH SENSITIVITY: 4.98 MG/L (ref 0.16–3)
CALCIUM SERPL-MCNC: 9.3 MG/DL (ref 8.3–10.6)
CHLORIDE BLD-SCNC: 101 MMOL/L (ref 99–110)
CHOLESTEROL, TOTAL: 174 MG/DL (ref 0–199)
CO2: 24 MMOL/L (ref 21–32)
CREAT SERPL-MCNC: 1.2 MG/DL (ref 0.8–1.3)
GFR AFRICAN AMERICAN: >60
GFR NON-AFRICAN AMERICAN: >60
GLOBULIN: 2.7 G/DL
GLUCOSE BLD-MCNC: 145 MG/DL (ref 70–99)
HDLC SERPL-MCNC: 33 MG/DL (ref 40–60)
HOMOCYSTEINE: 13 UMOL/L (ref 0–10)
LDL CHOLESTEROL CALCULATED: 114 MG/DL
POTASSIUM SERPL-SCNC: 4.9 MMOL/L (ref 3.5–5.1)
PRO-BNP: 82 PG/ML (ref 0–124)
SODIUM BLD-SCNC: 139 MMOL/L (ref 136–145)
TOTAL PROTEIN: 7.2 G/DL (ref 6.4–8.2)
TRIGL SERPL-MCNC: 133 MG/DL (ref 0–150)
VITAMIN D 25-HYDROXY: 46.9 NG/ML
VLDLC SERPL CALC-MCNC: 27 MG/DL

## 2018-12-01 LAB
ESTIMATED AVERAGE GLUCOSE: 131.2 MG/DL
HBA1C MFR BLD: 6.2 %

## 2018-12-05 ENCOUNTER — OFFICE VISIT (OUTPATIENT)
Dept: ORTHOPEDIC SURGERY | Age: 58
End: 2018-12-05
Payer: COMMERCIAL

## 2018-12-05 VITALS
HEART RATE: 53 BPM | WEIGHT: 250 LBS | HEIGHT: 68 IN | SYSTOLIC BLOOD PRESSURE: 142 MMHG | DIASTOLIC BLOOD PRESSURE: 81 MMHG | BODY MASS INDEX: 37.89 KG/M2

## 2018-12-05 DIAGNOSIS — Z96.612 STATUS POST LEFT SHOULDER HEMIARTHROPLASTY: Primary | ICD-10-CM

## 2018-12-05 DIAGNOSIS — M25.512 LEFT SHOULDER PAIN, UNSPECIFIED CHRONICITY: ICD-10-CM

## 2018-12-05 PROCEDURE — 99213 OFFICE O/P EST LOW 20 MIN: CPT | Performed by: ORTHOPAEDIC SURGERY

## 2018-12-07 LAB — PREGNENOLONE: 32 NG/DL (ref 23–173)

## 2019-01-08 ENCOUNTER — HOSPITAL ENCOUNTER (OUTPATIENT)
Dept: NON INVASIVE DIAGNOSTICS | Age: 59
Discharge: HOME | End: 2019-01-08
Payer: COMMERCIAL

## 2019-01-08 ENCOUNTER — HOSPITAL ENCOUNTER (OUTPATIENT)
Dept: CT IMAGING | Age: 59
Discharge: HOME | End: 2019-01-08
Payer: COMMERCIAL

## 2019-01-08 DIAGNOSIS — B33.24 VIRAL CARDIOMYOPATHY (HCC): ICD-10-CM

## 2019-01-08 DIAGNOSIS — I50.22 CHRONIC SYSTOLIC HEART FAILURE (HCC): ICD-10-CM

## 2019-01-08 DIAGNOSIS — I10 HYPERTENSION, BENIGN: ICD-10-CM

## 2019-01-08 LAB
LV EF: 30 %
LVEF MODALITY: NORMAL

## 2019-01-08 PROCEDURE — 75571 CT HRT W/O DYE W/CA TEST: CPT

## 2019-01-08 PROCEDURE — 6360000004 HC RX CONTRAST MEDICATION: Performed by: INTERNAL MEDICINE

## 2019-01-08 PROCEDURE — C8929 TTE W OR WO FOL WCON,DOPPLER: HCPCS

## 2019-01-08 RX ADMIN — PERFLUTREN 2.2 MG: 6.52 INJECTION, SUSPENSION INTRAVENOUS at 16:09

## 2019-01-14 ENCOUNTER — TELEPHONE (OUTPATIENT)
Dept: CARDIOLOGY CLINIC | Age: 59
End: 2019-01-14

## 2019-01-14 RX ORDER — FUROSEMIDE 20 MG/1
20 TABLET ORAL DAILY
Qty: 60 TABLET | Refills: 3 | Status: SHIPPED | OUTPATIENT
Start: 2019-01-14 | End: 2019-02-26 | Stop reason: SDUPTHER

## 2019-01-14 RX ORDER — VALSARTAN 160 MG/1
160 TABLET ORAL DAILY
Qty: 30 TABLET | Refills: 0 | Status: SHIPPED | OUTPATIENT
Start: 2019-01-14 | End: 2019-01-16 | Stop reason: SDUPTHER

## 2019-01-16 RX ORDER — VALSARTAN 160 MG/1
160 TABLET ORAL 2 TIMES DAILY
Qty: 60 TABLET | Refills: 3 | Status: SHIPPED | OUTPATIENT
Start: 2019-01-16 | End: 2019-03-27 | Stop reason: ALTCHOICE

## 2019-01-24 ENCOUNTER — OFFICE VISIT (OUTPATIENT)
Dept: CARDIOLOGY CLINIC | Age: 59
End: 2019-01-24
Payer: COMMERCIAL

## 2019-01-24 VITALS
DIASTOLIC BLOOD PRESSURE: 66 MMHG | WEIGHT: 250.2 LBS | SYSTOLIC BLOOD PRESSURE: 100 MMHG | HEART RATE: 60 BPM | BODY MASS INDEX: 38.04 KG/M2

## 2019-01-24 DIAGNOSIS — B33.24 VIRAL CARDIOMYOPATHY (HCC): ICD-10-CM

## 2019-01-24 DIAGNOSIS — L40.9 PSORIASIS: ICD-10-CM

## 2019-01-24 DIAGNOSIS — I50.22 CHRONIC SYSTOLIC HEART FAILURE (HCC): Primary | ICD-10-CM

## 2019-01-24 DIAGNOSIS — I10 HYPERTENSION, BENIGN: ICD-10-CM

## 2019-01-24 PROCEDURE — 99215 OFFICE O/P EST HI 40 MIN: CPT | Performed by: INTERNAL MEDICINE

## 2019-02-12 DIAGNOSIS — I50.22 CHRONIC SYSTOLIC HEART FAILURE (HCC): ICD-10-CM

## 2019-02-12 LAB
ANION GAP SERPL CALCULATED.3IONS-SCNC: 16 MMOL/L (ref 3–16)
BUN BLDV-MCNC: 18 MG/DL (ref 7–20)
C-REACTIVE PROTEIN: 5.8 MG/L (ref 0–5.1)
CALCIUM SERPL-MCNC: 9.3 MG/DL (ref 8.3–10.6)
CHLORIDE BLD-SCNC: 102 MMOL/L (ref 99–110)
CO2: 22 MMOL/L (ref 21–32)
CREAT SERPL-MCNC: 0.9 MG/DL (ref 0.8–1.3)
FERRITIN: 137.9 NG/ML (ref 30–400)
GFR AFRICAN AMERICAN: >60
GFR NON-AFRICAN AMERICAN: >60
GLUCOSE BLD-MCNC: 128 MG/DL (ref 70–99)
POTASSIUM SERPL-SCNC: 4.3 MMOL/L (ref 3.5–5.1)
SODIUM BLD-SCNC: 140 MMOL/L (ref 136–145)
TSH REFLEX: 0.63 UIU/ML (ref 0.27–4.2)

## 2019-02-13 LAB
ANGIOTENSIN CONVERTING ENZYME: 53 U/L (ref 9–67)
ANTI-NUCLEAR ANTIBODY (ANA): NEGATIVE

## 2019-02-16 LAB
KAPPA, FREE LIGHT CHAINS, SERUM: 14.88 MG/L (ref 3.3–19.4)
KAPPA/LAMBDA RATIO: 1.01 (ref 0.26–1.65)
KAPPA/LAMBDA TEST COMMENT: NORMAL
LAMBDA, FREE LIGHT CHAINS, SERUM: 14.79 MG/L (ref 5.71–26.3)

## 2019-02-26 ENCOUNTER — OFFICE VISIT (OUTPATIENT)
Dept: CARDIOLOGY CLINIC | Age: 59
End: 2019-02-26
Payer: COMMERCIAL

## 2019-02-26 VITALS
SYSTOLIC BLOOD PRESSURE: 140 MMHG | WEIGHT: 251.8 LBS | HEART RATE: 64 BPM | DIASTOLIC BLOOD PRESSURE: 80 MMHG | BODY MASS INDEX: 38.29 KG/M2

## 2019-02-26 DIAGNOSIS — I10 HYPERTENSION, BENIGN: ICD-10-CM

## 2019-02-26 DIAGNOSIS — I50.22 CHRONIC SYSTOLIC HEART FAILURE (HCC): Primary | ICD-10-CM

## 2019-02-26 DIAGNOSIS — G47.33 OBSTRUCTIVE SLEEP APNEA SYNDROME: ICD-10-CM

## 2019-02-26 DIAGNOSIS — I42.8 NONISCHEMIC CARDIOMYOPATHY (HCC): ICD-10-CM

## 2019-02-26 PROCEDURE — 99215 OFFICE O/P EST HI 40 MIN: CPT | Performed by: INTERNAL MEDICINE

## 2019-02-26 RX ORDER — SPIRONOLACTONE 25 MG/1
25 TABLET ORAL DAILY
Qty: 30 TABLET | Refills: 5 | Status: SHIPPED | OUTPATIENT
Start: 2019-02-26 | End: 2019-08-21 | Stop reason: SDUPTHER

## 2019-02-26 RX ORDER — FUROSEMIDE 20 MG/1
20 TABLET ORAL DAILY PRN
Qty: 60 TABLET | Refills: 3 | Status: SHIPPED | OUTPATIENT
Start: 2019-02-26 | End: 2019-08-21 | Stop reason: SDUPTHER

## 2019-03-12 ENCOUNTER — TELEPHONE (OUTPATIENT)
Dept: CARDIOLOGY CLINIC | Age: 59
End: 2019-03-12

## 2019-03-19 ENCOUNTER — TELEPHONE (OUTPATIENT)
Dept: CARDIOLOGY CLINIC | Age: 59
End: 2019-03-19

## 2019-03-29 ENCOUNTER — TELEPHONE (OUTPATIENT)
Dept: CARDIOLOGY CLINIC | Age: 59
End: 2019-03-29

## 2019-03-29 RX ORDER — LISINOPRIL 10 MG/1
10 TABLET ORAL DAILY
Qty: 90 TABLET | Refills: 1 | Status: SHIPPED | OUTPATIENT
Start: 2019-03-29 | End: 2019-10-23

## 2019-04-01 ENCOUNTER — TELEPHONE (OUTPATIENT)
Dept: INTERNAL MEDICINE CLINIC | Age: 59
End: 2019-04-01

## 2019-04-01 RX ORDER — CIPROFLOXACIN 500 MG/1
500 TABLET, FILM COATED ORAL 2 TIMES DAILY
Qty: 14 TABLET | Refills: 0 | Status: SHIPPED | OUTPATIENT
Start: 2019-04-01 | End: 2019-04-08

## 2019-04-01 NOTE — TELEPHONE ENCOUNTER
Pt spouse reports that patient passed a very bruno Kidney Stone today that's causing pt to have a burning pain after urination. She's requesting an antibiotic to help treat. Please advise.     [facility], [address] [facility] [phone]

## 2019-04-03 RX ORDER — LISINOPRIL 10 MG/1
10 TABLET ORAL DAILY
Qty: 90 TABLET | Refills: 3 | Status: SHIPPED | OUTPATIENT
Start: 2019-04-03 | End: 2019-10-23 | Stop reason: ALTCHOICE

## 2019-04-11 ENCOUNTER — TELEPHONE (OUTPATIENT)
Dept: CARDIOLOGY CLINIC | Age: 59
End: 2019-04-11

## 2019-04-11 NOTE — TELEPHONE ENCOUNTER
Pt calling to report that his new RX for Lisinopril is making him itch and he hasn't slept in days due to discomfort    Please call to advise

## 2019-04-22 DIAGNOSIS — R21 RASH DUE TO ALLERGY: Primary | ICD-10-CM

## 2019-04-22 DIAGNOSIS — T78.40XA RASH DUE TO ALLERGY: Primary | ICD-10-CM

## 2019-05-24 DIAGNOSIS — I50.22 CHRONIC SYSTOLIC CONGESTIVE HEART FAILURE (HCC): ICD-10-CM

## 2019-05-24 RX ORDER — CARVEDILOL 25 MG/1
25 TABLET ORAL 2 TIMES DAILY WITH MEALS
Qty: 180 TABLET | Refills: 3 | Status: SHIPPED | OUTPATIENT
Start: 2019-05-24 | End: 2019-08-21 | Stop reason: SDUPTHER

## 2019-06-17 ENCOUNTER — TELEPHONE (OUTPATIENT)
Dept: INTERNAL MEDICINE CLINIC | Age: 59
End: 2019-06-17

## 2019-06-17 NOTE — TELEPHONE ENCOUNTER
Pt calling to see if he can get an order for Elaine-IR which is a insulin resistance test to see what is causing his heart issues because the cardiologist does not know

## 2019-07-17 ENCOUNTER — TELEPHONE (OUTPATIENT)
Dept: INTERNAL MEDICINE CLINIC | Age: 59
End: 2019-07-17

## 2019-07-17 DIAGNOSIS — R21 SKIN RASH: ICD-10-CM

## 2019-07-17 RX ORDER — CLOBETASOL PROPIONATE 0.5 MG/G
OINTMENT TOPICAL
Qty: 60 G | Refills: 2 | Status: SHIPPED | OUTPATIENT
Start: 2019-07-17 | End: 2020-06-16

## 2019-08-05 DIAGNOSIS — T78.40XA RASH DUE TO ALLERGY: ICD-10-CM

## 2019-08-05 DIAGNOSIS — R21 RASH DUE TO ALLERGY: ICD-10-CM

## 2019-08-06 LAB
A/G RATIO: 1.5 (ref 1.1–2.2)
ALBUMIN SERPL-MCNC: 4.4 G/DL (ref 3.4–5)
ALP BLD-CCNC: 119 U/L (ref 40–129)
ALT SERPL-CCNC: 22 U/L (ref 10–40)
ANION GAP SERPL CALCULATED.3IONS-SCNC: 15 MMOL/L (ref 3–16)
AST SERPL-CCNC: 18 U/L (ref 15–37)
BASOPHILS ABSOLUTE: 0.1 K/UL (ref 0–0.2)
BASOPHILS RELATIVE PERCENT: 1.3 %
BILIRUB SERPL-MCNC: 0.5 MG/DL (ref 0–1)
BUN BLDV-MCNC: 16 MG/DL (ref 7–20)
CALCIUM SERPL-MCNC: 9.5 MG/DL (ref 8.3–10.6)
CHLORIDE BLD-SCNC: 101 MMOL/L (ref 99–110)
CO2: 23 MMOL/L (ref 21–32)
CREAT SERPL-MCNC: 1.1 MG/DL (ref 0.8–1.3)
EOSINOPHILS ABSOLUTE: 0.4 K/UL (ref 0–0.6)
EOSINOPHILS RELATIVE PERCENT: 3.9 %
GFR AFRICAN AMERICAN: >60
GFR NON-AFRICAN AMERICAN: >60
GLOBULIN: 2.9 G/DL
GLUCOSE BLD-MCNC: 133 MG/DL (ref 70–99)
HCT VFR BLD CALC: 44.3 % (ref 40.5–52.5)
HEMOGLOBIN: 15.3 G/DL (ref 13.5–17.5)
LYMPHOCYTES ABSOLUTE: 2 K/UL (ref 1–5.1)
LYMPHOCYTES RELATIVE PERCENT: 20.6 %
MCH RBC QN AUTO: 30.8 PG (ref 26–34)
MCHC RBC AUTO-ENTMCNC: 34.5 G/DL (ref 31–36)
MCV RBC AUTO: 89.3 FL (ref 80–100)
MONOCYTES ABSOLUTE: 0.7 K/UL (ref 0–1.3)
MONOCYTES RELATIVE PERCENT: 6.8 %
NEUTROPHILS ABSOLUTE: 6.5 K/UL (ref 1.7–7.7)
NEUTROPHILS RELATIVE PERCENT: 67.4 %
PDW BLD-RTO: 13.8 % (ref 12.4–15.4)
PLATELET # BLD: 157 K/UL (ref 135–450)
PMV BLD AUTO: 8.7 FL (ref 5–10.5)
POTASSIUM SERPL-SCNC: 4.3 MMOL/L (ref 3.5–5.1)
RBC # BLD: 4.95 M/UL (ref 4.2–5.9)
SODIUM BLD-SCNC: 139 MMOL/L (ref 136–145)
TOTAL PROTEIN: 7.3 G/DL (ref 6.4–8.2)
WBC # BLD: 9.6 K/UL (ref 4–11)

## 2019-08-21 DIAGNOSIS — I50.22 CHRONIC SYSTOLIC CONGESTIVE HEART FAILURE (HCC): ICD-10-CM

## 2019-08-25 RX ORDER — SPIRONOLACTONE 25 MG/1
25 TABLET ORAL DAILY
Qty: 90 TABLET | Refills: 3 | Status: SHIPPED | OUTPATIENT
Start: 2019-08-25 | End: 2019-08-26 | Stop reason: SDUPTHER

## 2019-08-25 RX ORDER — CARVEDILOL 25 MG/1
25 TABLET ORAL 2 TIMES DAILY WITH MEALS
Qty: 180 TABLET | Refills: 3 | Status: SHIPPED | OUTPATIENT
Start: 2019-08-25 | End: 2019-08-26 | Stop reason: SDUPTHER

## 2019-08-25 RX ORDER — FUROSEMIDE 20 MG/1
20 TABLET ORAL DAILY PRN
Qty: 180 TABLET | Refills: 3 | Status: SHIPPED | OUTPATIENT
Start: 2019-08-25 | End: 2019-08-26 | Stop reason: SDUPTHER

## 2019-08-26 DIAGNOSIS — I50.22 CHRONIC SYSTOLIC CONGESTIVE HEART FAILURE (HCC): ICD-10-CM

## 2019-08-26 RX ORDER — FUROSEMIDE 20 MG/1
20 TABLET ORAL DAILY PRN
Qty: 180 TABLET | Refills: 3 | Status: SHIPPED | OUTPATIENT
Start: 2019-08-26 | End: 2019-10-23 | Stop reason: SDUPTHER

## 2019-08-26 RX ORDER — CARVEDILOL 25 MG/1
25 TABLET ORAL 2 TIMES DAILY WITH MEALS
Qty: 180 TABLET | Refills: 3 | Status: SHIPPED | OUTPATIENT
Start: 2019-08-26 | End: 2020-08-04

## 2019-08-26 RX ORDER — SPIRONOLACTONE 25 MG/1
25 TABLET ORAL DAILY
Qty: 90 TABLET | Refills: 3 | Status: SHIPPED | OUTPATIENT
Start: 2019-08-26 | End: 2020-08-04

## 2019-10-14 DIAGNOSIS — Z79.899 LONG TERM USE OF DRUG: ICD-10-CM

## 2019-10-14 DIAGNOSIS — I50.22 CHRONIC SYSTOLIC CONGESTIVE HEART FAILURE (HCC): Primary | ICD-10-CM

## 2019-10-18 DIAGNOSIS — Z79.899 LONG TERM USE OF DRUG: ICD-10-CM

## 2019-10-18 DIAGNOSIS — I50.22 CHRONIC SYSTOLIC CONGESTIVE HEART FAILURE (HCC): ICD-10-CM

## 2019-10-18 LAB
ANION GAP SERPL CALCULATED.3IONS-SCNC: 17 MMOL/L (ref 3–16)
BUN BLDV-MCNC: 17 MG/DL (ref 7–20)
CALCIUM SERPL-MCNC: 9 MG/DL (ref 8.3–10.6)
CHLORIDE BLD-SCNC: 103 MMOL/L (ref 99–110)
CO2: 23 MMOL/L (ref 21–32)
CREAT SERPL-MCNC: 0.9 MG/DL (ref 0.8–1.3)
GFR AFRICAN AMERICAN: >60
GFR NON-AFRICAN AMERICAN: >60
GLUCOSE BLD-MCNC: 182 MG/DL (ref 70–99)
POTASSIUM SERPL-SCNC: 4.7 MMOL/L (ref 3.5–5.1)
PRO-BNP: 101 PG/ML (ref 0–124)
SODIUM BLD-SCNC: 143 MMOL/L (ref 136–145)

## 2019-10-23 ENCOUNTER — OFFICE VISIT (OUTPATIENT)
Dept: CARDIOLOGY CLINIC | Age: 59
End: 2019-10-23
Payer: COMMERCIAL

## 2019-10-23 VITALS
OXYGEN SATURATION: 98 % | WEIGHT: 244 LBS | SYSTOLIC BLOOD PRESSURE: 104 MMHG | HEART RATE: 54 BPM | DIASTOLIC BLOOD PRESSURE: 62 MMHG | HEIGHT: 68 IN | BODY MASS INDEX: 36.98 KG/M2 | RESPIRATION RATE: 18 BRPM

## 2019-10-23 DIAGNOSIS — I42.8 NONISCHEMIC CARDIOMYOPATHY (HCC): Primary | ICD-10-CM

## 2019-10-23 DIAGNOSIS — I50.22 CHRONIC SYSTOLIC HEART FAILURE (HCC): ICD-10-CM

## 2019-10-23 DIAGNOSIS — I10 HYPERTENSION, BENIGN: ICD-10-CM

## 2019-10-23 PROCEDURE — 99214 OFFICE O/P EST MOD 30 MIN: CPT | Performed by: INTERNAL MEDICINE

## 2019-10-23 RX ORDER — FUROSEMIDE 20 MG/1
20 TABLET ORAL DAILY PRN
Qty: 180 TABLET | Refills: 3 | Status: SHIPPED | OUTPATIENT
Start: 2019-10-23 | End: 2020-07-17

## 2019-10-23 RX ORDER — LISINOPRIL 5 MG/1
5 TABLET ORAL DAILY
Qty: 30 TABLET | Refills: 5 | Status: SHIPPED | OUTPATIENT
Start: 2019-10-23 | End: 2020-01-29

## 2019-10-23 RX ORDER — MAGNESIUM 30 MG
400 TABLET ORAL DAILY
COMMUNITY

## 2019-12-04 ENCOUNTER — OFFICE VISIT (OUTPATIENT)
Dept: ORTHOPEDIC SURGERY | Age: 59
End: 2019-12-04
Payer: COMMERCIAL

## 2019-12-04 VITALS
WEIGHT: 247 LBS | DIASTOLIC BLOOD PRESSURE: 89 MMHG | SYSTOLIC BLOOD PRESSURE: 110 MMHG | HEIGHT: 68 IN | HEART RATE: 60 BPM | BODY MASS INDEX: 37.44 KG/M2

## 2019-12-04 DIAGNOSIS — Z96.612 STATUS POST SHOULDER HEMIARTHROPLASTY, LEFT: Primary | ICD-10-CM

## 2019-12-04 DIAGNOSIS — M25.512 LEFT SHOULDER PAIN, UNSPECIFIED CHRONICITY: ICD-10-CM

## 2019-12-04 PROCEDURE — 99213 OFFICE O/P EST LOW 20 MIN: CPT | Performed by: ORTHOPAEDIC SURGERY

## 2019-12-04 ASSESSMENT — ENCOUNTER SYMPTOMS
GASTROINTESTINAL NEGATIVE: 1
ALLERGIC/IMMUNOLOGIC NEGATIVE: 1
EYES NEGATIVE: 1
ROS SKIN COMMENTS: SKIN CONDITION / CANCER
SHORTNESS OF BREATH: 1

## 2020-01-03 ENCOUNTER — APPOINTMENT (OUTPATIENT)
Dept: CT IMAGING | Age: 60
End: 2020-01-03
Payer: COMMERCIAL

## 2020-01-03 ENCOUNTER — HOSPITAL ENCOUNTER (EMERGENCY)
Age: 60
Discharge: HOME | End: 2020-01-03
Attending: EMERGENCY MEDICINE
Payer: COMMERCIAL

## 2020-01-03 VITALS
SYSTOLIC BLOOD PRESSURE: 135 MMHG | DIASTOLIC BLOOD PRESSURE: 70 MMHG | OXYGEN SATURATION: 100 % | BODY MASS INDEX: 38.77 KG/M2 | TEMPERATURE: 97.7 F | WEIGHT: 255 LBS | RESPIRATION RATE: 16 BRPM | HEART RATE: 59 BPM

## 2020-01-03 LAB
BILIRUBIN URINE: NEGATIVE
BLOOD, URINE: NEGATIVE
CLARITY: CLEAR
COLOR: YELLOW
GLUCOSE URINE: NEGATIVE MG/DL
KETONES, URINE: NEGATIVE MG/DL
LEUKOCYTE ESTERASE, URINE: NEGATIVE
MICROSCOPIC EXAMINATION: NORMAL
NITRITE, URINE: NEGATIVE
PH UA: 5.5 (ref 5–8)
PROTEIN UA: NEGATIVE MG/DL
SPECIFIC GRAVITY UA: 1.02 (ref 1–1.03)
URINE REFLEX TO CULTURE: NORMAL
URINE TYPE: NORMAL
UROBILINOGEN, URINE: 0.2 E.U./DL

## 2020-01-03 PROCEDURE — 81003 URINALYSIS AUTO W/O SCOPE: CPT

## 2020-01-03 PROCEDURE — 74176 CT ABD & PELVIS W/O CONTRAST: CPT

## 2020-01-03 PROCEDURE — 99284 EMERGENCY DEPT VISIT MOD MDM: CPT

## 2020-01-03 ASSESSMENT — PAIN DESCRIPTION - DESCRIPTORS: DESCRIPTORS: ACHING

## 2020-01-03 ASSESSMENT — PAIN DESCRIPTION - LOCATION: LOCATION: FLANK

## 2020-01-03 ASSESSMENT — PAIN SCALES - GENERAL: PAINLEVEL_OUTOF10: 2

## 2020-01-03 ASSESSMENT — PAIN DESCRIPTION - ORIENTATION: ORIENTATION: RIGHT

## 2020-01-03 NOTE — ED NOTES
Discharge instructions reviewed with patient. Pt verbalized understanding. Pt discharged home.       Tacos Mcallister RN  01/03/20 0254

## 2020-01-03 NOTE — ED TRIAGE NOTES
Pt woke up this morning with right flank pain that radiated to his right testicle as well as RLQ abdominal pain. Pt has a hx of kidney stones.

## 2020-01-24 DIAGNOSIS — I50.22 CHRONIC SYSTOLIC HEART FAILURE (HCC): ICD-10-CM

## 2020-01-24 LAB
ANION GAP SERPL CALCULATED.3IONS-SCNC: 14 MMOL/L (ref 3–16)
BUN BLDV-MCNC: 27 MG/DL (ref 7–20)
CALCIUM SERPL-MCNC: 9.2 MG/DL (ref 8.3–10.6)
CHLORIDE BLD-SCNC: 97 MMOL/L (ref 99–110)
CO2: 27 MMOL/L (ref 21–32)
CREAT SERPL-MCNC: 1.2 MG/DL (ref 0.8–1.3)
GFR AFRICAN AMERICAN: >60
GFR NON-AFRICAN AMERICAN: >60
GLUCOSE BLD-MCNC: 143 MG/DL (ref 70–99)
POTASSIUM SERPL-SCNC: 4.3 MMOL/L (ref 3.5–5.1)
SODIUM BLD-SCNC: 138 MMOL/L (ref 136–145)

## 2020-01-29 ENCOUNTER — OFFICE VISIT (OUTPATIENT)
Dept: CARDIOLOGY CLINIC | Age: 60
End: 2020-01-29
Payer: COMMERCIAL

## 2020-01-29 VITALS
DIASTOLIC BLOOD PRESSURE: 80 MMHG | SYSTOLIC BLOOD PRESSURE: 132 MMHG | BODY MASS INDEX: 37.46 KG/M2 | WEIGHT: 246.4 LBS | HEART RATE: 60 BPM

## 2020-01-29 PROCEDURE — 99214 OFFICE O/P EST MOD 30 MIN: CPT | Performed by: INTERNAL MEDICINE

## 2020-01-29 RX ORDER — LISINOPRIL 10 MG/1
10 TABLET ORAL DAILY
Qty: 90 TABLET | Refills: 3 | Status: SHIPPED | OUTPATIENT
Start: 2020-01-29 | End: 2020-07-17

## 2020-01-29 NOTE — PROGRESS NOTES
Date End Date Taking? Authorizing Provider   lisinopril (PRINIVIL;ZESTRIL) 10 MG tablet Take 1 tablet by mouth daily 1/29/20  Yes Kathie Dennis MD   magnesium 30 MG tablet Take 400 mg by mouth daily    Yes Historical Provider, MD   SELENIUM PO Take by mouth   Yes Historical Provider, MD   furosemide (LASIX) 20 MG tablet Take 1 tablet by mouth daily as needed (weight gain > 3-5 lbs) 10/23/19  Yes Kathie Dennis MD   spironolactone (ALDACTONE) 25 MG tablet Take 1 tablet by mouth daily 8/26/19  Yes Kathie Dennis MD   carvedilol (COREG) 25 MG tablet Take 1 tablet by mouth 2 times daily (with meals) 8/26/19  Yes Kathie Dennis MD   clobetasol (TEMOVATE) 0.05 % ointment Apply topically 2 times daily. 7/17/19  Yes Wilda Llanes MD   Zinc Sulfate (ZINC 15 PO) Take by mouth   Yes Historical Provider, MD   vitamin E 400 UNIT capsule Take 400 Units by mouth daily   Yes Historical Provider, MD   Coenzyme Q10 (COQ10) 100 MG CAPS Take 4 tablets by mouth    Yes Historical Provider, MD   Multiple Vitamins-Minerals (THERAPEUTIC MULTIVITAMIN-MINERALS) tablet Take 1 tablet by mouth daily   Yes Historical Provider, MD   Glucosamine-Chondroit-Vit C-Mn (GLUCOSAMINE 1500 COMPLEX PO) Take by mouth   Yes Historical Provider, MD   Omega-3 Fatty Acids (FISH OIL) 1200 MG CAPS Take by mouth 3 tablets once a week   Yes Historical Provider, MD   B Complex Vitamins (VITAMIN-B COMPLEX) TABS Take by mouth   Yes Historical Provider, MD   magnesium citrate solution Take 296 mLs by mouth once   Yes Historical Provider, MD   vitamin D (CHOLECALCIFEROL) 400 UNIT TABS tablet Take 400 Units by mouth 2 times daily. 8/18/10  Yes Historical Provider, MD          Allergy:     Doxycycline; Hydrocodone; Iodides; Ketorolac tromethamine; Naproxen; Norco [hydrocodone-acetaminophen]; Oxycodone-acetaminophen; Percocet [oxycodone-acetaminophen]; and Clindamycin/lincomycin       Review of Systems:     All 12 point review of symptoms completed.  Pertinent positives identified in the HPI, all other review of symptoms negative as below. CONSTITUTIONAL: No fatigue  SKIN: No rash or pruritis. EYES: No visual changes or diplopia. No scleral icterus. ENT: No Headaches, hearing loss or vertigo. No mouth sores or sore throat. CARDIOVASCULAR: No chest pain/chest pressure/chest discomfort. No palpitations. No edema. RESPIRATORY: No dyspnea. No cough or wheezing, no sputum production. GASTROINTESTINAL: No N/V/D. No abdominal pain, appetite loss, blood in stools. GENITOURINARY: No dysuria, trouble voiding, or hematuria. MUSCULOSKELETAL:  No gait disturbance, weakness or joint complaints. NEUROLOGICAL: No headache, diplopia, change in muscle strength, numbness or tingling. No change in gait, balance, coordination, mood, affect, memory, mentation, behavior. PSHYCH: No anxiety, loss of interest, change in sexual behavior, feelings of self-harm, or confusion. ENDOCRINE: No excessive thirst, fluid intake, or urination. No tremor. HEMATOLOGIC: No abnormal bruising or bleeding. ALLERGY: No nasal congestion or hives.       Physical Examination:     Vitals:    01/29/20 1544 01/29/20 1557   BP: (!) 166/88 132/80   Pulse: 60    Weight: 246 lb 6.4 oz (111.8 kg)        Wt Readings from Last 3 Encounters:   01/29/20 246 lb 6.4 oz (111.8 kg)   01/03/20 255 lb (115.7 kg)   12/04/19 247 lb (112 kg)         General Appearance:  Alert, cooperative, no distress, appears stated age Appropriate weight   Head:  Normocephalic, without obvious abnormality, atraumatic   Eyes:  PERRL, conjunctiva/corneas clear EOM intact  Ears normal   Throat no lesions       Nose: Nares normal, no drainage or sinus tenderness   Throat: Lips, mucosa, and tongue normal   Neck: Supple, symmetrical, trachea midline, no adenopathy, thyroid: not enlarged, symmetric, no tenderness/mass/nodules, no carotid bruit       Lungs:   Clear to auscultation bilaterally, respirations unlabored   Chest Wall:  No tenderness or participate in the care of your patient. If you have any questions, please do not hesitate to contact me.      Brian Reese MD, [facility] [place] [address] Phone: [phone]  Heart Failure Hotline: [phone]  Fax: [phone]

## 2020-05-22 ENCOUNTER — TELEPHONE (OUTPATIENT)
Dept: INTERNAL MEDICINE CLINIC | Age: 60
End: 2020-05-22

## 2020-05-22 NOTE — TELEPHONE ENCOUNTER
Pt requesting a prescription for Allopurinol for his gout on left foot. Pt states his foot light red, puff,warm and toes are stiff and sore.  Pt states he had gout in past.       Please be advise

## 2020-05-22 NOTE — TELEPHONE ENCOUNTER
Per Dr. Chyrel Bosworth ok to order Allopurinol 100 mg once a day #30 and needs an appointment. Spouse advised and appointment made for 5/27/2020. Spouse advised script will be sent.

## 2020-05-23 RX ORDER — ALLOPURINOL 100 MG/1
100 TABLET ORAL DAILY
Qty: 30 TABLET | Refills: 0 | Status: SHIPPED | OUTPATIENT
Start: 2020-05-23 | End: 2020-05-27 | Stop reason: SDUPTHER

## 2020-05-27 ENCOUNTER — OFFICE VISIT (OUTPATIENT)
Dept: INTERNAL MEDICINE CLINIC | Age: 60
End: 2020-05-27
Payer: COMMERCIAL

## 2020-05-27 VITALS
HEART RATE: 50 BPM | SYSTOLIC BLOOD PRESSURE: 134 MMHG | DIASTOLIC BLOOD PRESSURE: 77 MMHG | HEIGHT: 68 IN | WEIGHT: 241 LBS | BODY MASS INDEX: 36.53 KG/M2 | TEMPERATURE: 97.7 F

## 2020-05-27 DIAGNOSIS — I10 ESSENTIAL HYPERTENSION: ICD-10-CM

## 2020-05-27 DIAGNOSIS — L40.9 PSORIASIS: ICD-10-CM

## 2020-05-27 DIAGNOSIS — Z12.5 PROSTATE CANCER SCREENING: ICD-10-CM

## 2020-05-27 DIAGNOSIS — M1A.0720 IDIOPATHIC CHRONIC GOUT OF LEFT FOOT WITHOUT TOPHUS: ICD-10-CM

## 2020-05-27 LAB
A/G RATIO: 1.6 (ref 1.1–2.2)
ALBUMIN SERPL-MCNC: 4.4 G/DL (ref 3.4–5)
ALP BLD-CCNC: 102 U/L (ref 40–129)
ALT SERPL-CCNC: 29 U/L (ref 10–40)
ANION GAP SERPL CALCULATED.3IONS-SCNC: 11 MMOL/L (ref 3–16)
AST SERPL-CCNC: 21 U/L (ref 15–37)
BASOPHILS ABSOLUTE: 0.1 K/UL (ref 0–0.2)
BASOPHILS RELATIVE PERCENT: 1 %
BILIRUB SERPL-MCNC: 0.6 MG/DL (ref 0–1)
BUN BLDV-MCNC: 20 MG/DL (ref 7–20)
CALCIUM SERPL-MCNC: 9 MG/DL (ref 8.3–10.6)
CHLORIDE BLD-SCNC: 99 MMOL/L (ref 99–110)
CHOLESTEROL, TOTAL: 180 MG/DL (ref 0–199)
CO2: 23 MMOL/L (ref 21–32)
CREAT SERPL-MCNC: 0.9 MG/DL (ref 0.8–1.3)
EOSINOPHILS ABSOLUTE: 0.4 K/UL (ref 0–0.6)
EOSINOPHILS RELATIVE PERCENT: 4.1 %
GFR AFRICAN AMERICAN: >60
GFR NON-AFRICAN AMERICAN: >60
GLOBULIN: 2.8 G/DL
GLUCOSE BLD-MCNC: 183 MG/DL (ref 70–99)
HCT VFR BLD CALC: 43.8 % (ref 40.5–52.5)
HDLC SERPL-MCNC: 41 MG/DL (ref 40–60)
HEMOGLOBIN: 15.1 G/DL (ref 13.5–17.5)
LDL CHOLESTEROL CALCULATED: 104 MG/DL
LYMPHOCYTES ABSOLUTE: 1.7 K/UL (ref 1–5.1)
LYMPHOCYTES RELATIVE PERCENT: 17.6 %
MCH RBC QN AUTO: 30.9 PG (ref 26–34)
MCHC RBC AUTO-ENTMCNC: 34.4 G/DL (ref 31–36)
MCV RBC AUTO: 90 FL (ref 80–100)
MONOCYTES ABSOLUTE: 0.7 K/UL (ref 0–1.3)
MONOCYTES RELATIVE PERCENT: 7.1 %
NEUTROPHILS ABSOLUTE: 6.9 K/UL (ref 1.7–7.7)
NEUTROPHILS RELATIVE PERCENT: 70.2 %
PDW BLD-RTO: 13.5 % (ref 12.4–15.4)
PLATELET # BLD: 160 K/UL (ref 135–450)
PMV BLD AUTO: 8.9 FL (ref 5–10.5)
POTASSIUM SERPL-SCNC: 4.5 MMOL/L (ref 3.5–5.1)
PROSTATE SPECIFIC ANTIGEN: 0.4 NG/ML (ref 0–4)
RBC # BLD: 4.87 M/UL (ref 4.2–5.9)
SODIUM BLD-SCNC: 133 MMOL/L (ref 136–145)
TOTAL PROTEIN: 7.2 G/DL (ref 6.4–8.2)
TRIGL SERPL-MCNC: 175 MG/DL (ref 0–150)
TSH SERPL DL<=0.05 MIU/L-ACNC: 0.41 UIU/ML (ref 0.27–4.2)
URIC ACID, SERUM: 6.5 MG/DL (ref 3.5–7.2)
VITAMIN D 25-HYDROXY: 74 NG/ML
VLDLC SERPL CALC-MCNC: 35 MG/DL
WBC # BLD: 9.8 K/UL (ref 4–11)

## 2020-05-27 PROCEDURE — 99214 OFFICE O/P EST MOD 30 MIN: CPT | Performed by: HOSPITALIST

## 2020-05-27 RX ORDER — ALLOPURINOL 100 MG/1
100 TABLET ORAL DAILY
Qty: 30 TABLET | Refills: 5 | Status: SHIPPED | OUTPATIENT
Start: 2020-05-27 | End: 2020-12-14

## 2020-05-27 ASSESSMENT — PATIENT HEALTH QUESTIONNAIRE - PHQ9
1. LITTLE INTEREST OR PLEASURE IN DOING THINGS: 0
2. FEELING DOWN, DEPRESSED OR HOPELESS: 0
SUM OF ALL RESPONSES TO PHQ QUESTIONS 1-9: 0
SUM OF ALL RESPONSES TO PHQ QUESTIONS 1-9: 0
SUM OF ALL RESPONSES TO PHQ9 QUESTIONS 1 & 2: 0

## 2020-05-27 NOTE — PROGRESS NOTES
Follow Up Visit Established Patient Visit    Patient:  Jd Jefferson                                               : 1960  Age: 62 y.o. MRN: <[id number]>  Date : 2020    Jd Jefferson is a 62 y.o. male who presents for : Follow-up evaluation. Chief Complaint   Patient presents with    Foot Swelling     left foot     Has long history of left foot gout. Started to have L foot gout exacerbation about 1 week ago. Restarted PO Allopurinol and reduced purine intake in his diet. Reports worsening of psoriasis. Checks his BP infrequently. Does not report shortness of breath/dyspnea on exertion. No heart palpitations. Last echocardiogram was performed on 2019:  Technically difficult examination. Left ventricular cavity size is normal. Normal left ventricular wall   thickness. Overall left ventricular systolic function appears reduced with an ejection   fraction of 30%. There is diffuse hypokinesis. Normal diastolic function. Trivial pulmonic regurgitation present. The aortic root is borderline dilated measuring 3.8cm. Past Medical History:   Diagnosis Date    Arthritis     CHF (congestive heart failure) ([address]     [facility]     Hypertension, benign     Kidney stone     Lipoma     removal righy shoulder    Other drug allergy(995.27)     Psoriasis     pt states it is worse since being on Metoprolol    Thyroid disease     enlarged    Unspecified sleep apnea     no CPAP.  Has not done a sleep study       Past Surgical History:   Procedure Laterality Date    HERNIA REPAIR      right     JOINT REPLACEMENT  10/27/2017    left shoulder    LIPOMA RESECTION      OTHER SURGICAL HISTORY Left 10/27/2017    LEFT SHOULDER RESURFACTING HEMIARTHROPLASTY, OPEN BICEPS    TONSILLECTOMY         Current Outpatient Medications   Medication Sig Dispense Refill    allopurinol (ZYLOPRIM) 100 MG tablet Take 1 tablet by mouth daily 30 tablet 5    lisinopril appointments with his cardiologist/CHF specialist Dell Victor MD         -    ? Need for ICD p[lacement    Psoriasis  -     Vitamin D 25 Hydroxy; Future  -     External Referral To Dermatology  -     The need to be started on methotrexate or biological medications  -     Continue local applications of clobetasol cream    Prostate cancer screening  -     Psa screening; Future        Return in about 3 months (around 8/27/2020) for HTN, CHF, gout.     Caterina Teixeira MD

## 2020-05-28 ENCOUNTER — TELEPHONE (OUTPATIENT)
Dept: CARDIOLOGY CLINIC | Age: 60
End: 2020-05-28

## 2020-05-28 DIAGNOSIS — R73.9 HYPERGLYCEMIA: ICD-10-CM

## 2020-05-28 NOTE — TELEPHONE ENCOUNTER
Patient wants someone to call him about his last echo 1-24-19 . Macarena Billingsley [phone] . He is concern because Dr. Nathan Moralez said it was 27 .

## 2020-05-29 LAB
ESTIMATED AVERAGE GLUCOSE: 151.3 MG/DL
HBA1C MFR BLD: 6.9 %

## 2020-06-02 ENCOUNTER — TELEPHONE (OUTPATIENT)
Dept: INTERNAL MEDICINE CLINIC | Age: 60
End: 2020-06-02

## 2020-06-16 RX ORDER — CLOBETASOL PROPIONATE 0.5 MG/G
OINTMENT TOPICAL
Qty: 60 G | Refills: 2 | Status: SHIPPED | OUTPATIENT
Start: 2020-06-16 | End: 2021-05-18

## 2020-07-17 ENCOUNTER — OFFICE VISIT (OUTPATIENT)
Dept: CARDIOLOGY CLINIC | Age: 60
End: 2020-07-17
Payer: COMMERCIAL

## 2020-07-17 VITALS
BODY MASS INDEX: 35.31 KG/M2 | WEIGHT: 232.2 LBS | SYSTOLIC BLOOD PRESSURE: 130 MMHG | DIASTOLIC BLOOD PRESSURE: 70 MMHG | HEART RATE: 51 BPM

## 2020-07-17 PROCEDURE — 99214 OFFICE O/P EST MOD 30 MIN: CPT | Performed by: INTERNAL MEDICINE

## 2020-07-17 RX ORDER — LISINOPRIL 10 MG/1
10 TABLET ORAL 2 TIMES DAILY
Qty: 180 TABLET | Refills: 3 | Status: SHIPPED | OUTPATIENT
Start: 2020-07-17 | End: 2021-04-23 | Stop reason: ALTCHOICE

## 2020-07-17 RX ORDER — FUROSEMIDE 20 MG/1
20 TABLET ORAL DAILY
Qty: 90 TABLET | Refills: 3 | Status: SHIPPED | OUTPATIENT
Start: 2020-07-17 | End: 2021-06-30 | Stop reason: HOSPADM

## 2020-07-17 NOTE — PROGRESS NOTES
Cc: HFrEF due to [facility]    HPI:     Mr Alpa Barone is a 63 yo man with h/o HFrEF due to [facility] (EF 20% in 2016), untreated VERN, HTN, overweight, HLP.      Echo 07/2016: LVEDD 6.1, EF 20%, normal thickness.      LHC 08/2016: no CAD, EF 25-30%.    Echo 10/2016: LVEDD 5.7, EF 25-30%.      MUGA 08/2017: EF 47%.     Echo 01/2019: LVEDD 5.9, EF 30%, normal thickness, normal RV and mild valve disease.      Ca score 0 (zero) 01/2019.     He is very interested in finding out if there are any other potential causes for his cardiomyopathy which could be treatable, because he \"does not want only to treat symptoms\" with meds. Hence, the last time we ordered a CMR and blood tests to r/o causes of [facility].      NICMO labs: serum free light chain assay, ferritin, MELY, ACE and TSH within normal limits, CRP mildly elevated. BMP normal.      CMR at [facility] 2/14/19: LV dilated, LVEF 40%, mild LVH, no LGE (hence no scar), normal ECV (hence no inflammation), normal RV size and function, normal valves and pericardium. ECG 1/24/19: sinus mik 59, nonspecific changes,  ms.      Patient returns for a follow up. He reports no complaints, remains active. He has lost weight (14 lbs since visit in 01/2020). His BP remains mildly elevated at home. Histories     Past Medical History:   has a past medical history of Arthritis, CHF (congestive heart failure) ([address] CHF (congestive heart failure) ([address] Hypertension, benign, Kidney stone, Lipoma, Other drug allergy(995.27), Psoriasis, Thyroid disease, and Unspecified sleep apnea. Surgical History:   has a past surgical history that includes Tonsillectomy; hernia repair; lipoma resection; joint replacement (10/27/2017); and other surgical history (Left, 10/27/2017). Social History:   reports that he has never smoked. He has never used smokeless tobacco. He reports current alcohol use. He reports that he does not use drugs.      Family History:  No evidence for sudden cardiac death or premature CAD      Medications:     Home medications were reviewed and are listed below    Prior to Admission medications    Medication Sig Start Date End Date Taking? Authorizing Provider   lisinopril (PRINIVIL;ZESTRIL) 10 MG tablet Take 1 tablet by mouth 2 times daily 7/17/20  Yes Aspen Officer, MD   furosemide (LASIX) 20 MG tablet Take 1 tablet by mouth daily 7/17/20  Yes Aspen Officer, MD   clobetasol (TEMOVATE) 0.05 % ointment APPLY EXTERNALLY TO THE AFFECTED AREA TWICE DAILY 6/16/20   Boo Andrade MD   allopurinol (ZYLOPRIM) 100 MG tablet Take 1 tablet by mouth daily 5/27/20   Boo Andrade MD   magnesium 30 MG tablet Take 400 mg by mouth daily     Historical Provider, MD   spironolactone (ALDACTONE) 25 MG tablet Take 1 tablet by mouth daily 8/26/19   Aspen Officer, MD   carvedilol (COREG) 25 MG tablet Take 1 tablet by mouth 2 times daily (with meals) 8/26/19   Aspen Officer, MD   Zinc Sulfate (ZINC 15 PO) Take by mouth    Historical Provider, MD   vitamin E 400 UNIT capsule Take 400 Units by mouth daily    Historical Provider, MD   Coenzyme Q10 (COQ10) 100 MG CAPS Take 4 tablets by mouth     Historical Provider, MD   Glucosamine-Chondroit-Vit C-Mn (GLUCOSAMINE 1500 COMPLEX PO) Take by mouth    Historical Provider, MD   Omega-3 Fatty Acids (FISH OIL) 1200 MG CAPS Take by mouth 3 tablets once a week    Historical Provider, MD   B Complex Vitamins (VITAMIN-B COMPLEX) TABS Take by mouth    Historical Provider, MD   magnesium citrate solution Take 296 mLs by mouth once    Historical Provider, MD   vitamin D (CHOLECALCIFEROL) 400 UNIT TABS tablet Take 400 Units by mouth 2 times daily. 8/18/10   Historical Provider, MD          Allergy:     Doxycycline; Hydrocodone; Iodides; Ketorolac tromethamine; Naproxen; Norco [hydrocodone-acetaminophen]; Oxycodone-acetaminophen; Percocet [oxycodone-acetaminophen]; and Clindamycin/lincomycin       Review of Systems:     All 12 point review of symptoms completed.  Pertinent positives identified in the HPI, all other review of symptoms negative as below. CONSTITUTIONAL: No fatigue  SKIN: No rash or pruritis. EYES: No visual changes or diplopia. No scleral icterus. ENT: No Headaches, hearing loss or vertigo. No mouth sores or sore throat. CARDIOVASCULAR: No chest pain/chest pressure/chest discomfort. No palpitations. No edema. RESPIRATORY: No dyspnea. No cough or wheezing, no sputum production. GASTROINTESTINAL: No N/V/D. No abdominal pain, appetite loss, blood in stools. GENITOURINARY: No dysuria, trouble voiding, or hematuria. MUSCULOSKELETAL:  No gait disturbance, weakness or joint complaints. NEUROLOGICAL: No headache, diplopia, change in muscle strength, numbness or tingling. No change in gait, balance, coordination, mood, affect, memory, mentation, behavior. PSHYCH: No anxiety, loss of interest, change in sexual behavior, feelings of self-harm, or confusion. ENDOCRINE: No excessive thirst, fluid intake, or urination. No tremor. HEMATOLOGIC: No abnormal bruising or bleeding. ALLERGY: No nasal congestion or hives.       Physical Examination:     Vitals:    07/17/20 0846   BP: 130/70   Pulse: 51   Weight: 232 lb 3.2 oz (105.3 kg)       Wt Readings from Last 3 Encounters:   07/17/20 232 lb 3.2 oz (105.3 kg)   05/27/20 241 lb (109.3 kg)   01/29/20 246 lb 6.4 oz (111.8 kg)         General Appearance:  Alert, cooperative, no distress, appears stated age Appropriate weight   Head:  Normocephalic, without obvious abnormality, atraumatic   Eyes:  PERRL, conjunctiva/corneas clear EOM intact  Ears normal   Throat no lesions       Nose: Nares normal, no drainage or sinus tenderness   Throat: Lips, mucosa, and tongue normal   Neck: Supple, symmetrical, trachea midline, no adenopathy, thyroid: not enlarged, symmetric, no tenderness/mass/nodules, no carotid bruit       Lungs:   Clear to auscultation bilaterally, respirations unlabored   Chest Wall:  No tenderness or deformity Heart:  Regular rhythm, rate is controlled, S1, S2 normal, there is no murmur, there is no rub or gallop, no jvd, no bilateral lower extremity edema   Abdomen:   Soft, non-tender, bowel sounds active all four quadrants,  no masses, no organomegaly       Extremities: Extremities normal, atraumatic, no cyanosis   Pulses: 2+ and symmetric   Skin: Skin color, texture, turgor normal, no rashes or lesions   Pysch: Normal mood and affect   Neurologic: Normal gross motor and sensory exam.  Cranial nerves intact        Labs:     Lab Results   Component Value Date    WBC 9.8 05/27/2020    HGB 15.1 05/27/2020    HCT 43.8 05/27/2020    MCV 90.0 05/27/2020     05/27/2020     Lab Results   Component Value Date     (L) 05/27/2020    K 4.5 05/27/2020    CL 99 05/27/2020    CO2 23 05/27/2020    BUN 20 05/27/2020    CREATININE 0.9 05/27/2020    GLUCOSE 183 (H) 05/27/2020    CALCIUM 9.0 05/27/2020    PROT 7.2 05/27/2020    LABALBU 4.4 05/27/2020    BILITOT 0.6 05/27/2020    ALKPHOS 102 05/27/2020    AST 21 05/27/2020    ALT 29 05/27/2020    LABGLOM >60 05/27/2020    GFRAA >60 05/27/2020    AGRATIO 1.6 05/27/2020    GLOB 2.8 05/27/2020         Lab Results   Component Value Date    CHOL 180 05/27/2020    CHOL 174 11/30/2018    CHOL 200 (H) 08/15/2017     Lab Results   Component Value Date    TRIG 175 (H) 05/27/2020    TRIG 133 11/30/2018    TRIG 241 (H) 08/15/2017     Lab Results   Component Value Date    HDL 41 05/27/2020    HDL 33 (L) 11/30/2018    HDL 33 (L) 08/15/2017     Lab Results   Component Value Date    LDLCHOLESTEROL 128 02/20/2008    LDLCALC 104 (H) 05/27/2020    LDLCALC 114 (H) 11/30/2018    LDLCALC 119 (H) 08/15/2017     Lab Results   Component Value Date    LABVLDL 35 05/27/2020    LABVLDL 27 11/30/2018    LABVLDL 48 08/15/2017     No results found for: CHOLHDLRATIKAR    Lab Results   Component Value Date    INR 0.98 10/10/2017    INR 1.05 08/02/2016    PROTIME 11.1 10/10/2017    PROTIME 12.0 08/02/2016 The 10-year ASCVD risk score (Gabriella Zimmerman, et al., 2013) is: 11.7%    Values used to calculate the score:      Age: 62 years      Sex: Male      Is Non- : No      Diabetic: No      Tobacco smoker: No      Systolic Blood Pressure: 971 mmHg      Is BP treated: Yes      HDL Cholesterol: 41 mg/dL      Total Cholesterol: 180 mg/dL      Assessment / Plan:      Diagnosis Orders   1. Chronic systolic heart failure (HCC)  Basic Metabolic Panel   2. Nonischemic cardiomyopathy ([address]     3. Hypertension, benign          1. 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 His NYHA FC is I, stage C and appears euvolemic and hemo stable. Cardiac MR suggests dilated cardiomyopathy of unknown origin (could be viral or genetic), no evidence of scar or active inflammation, LVEF improved, normal RV.      -I advised him to seek treatment for his VERN which could be contributing to his CMO.   -LVEF was more accurately measured by CMR and is 40%, most likely improved on HF meds. He does not need an ICD at this time. -C/w spironolactone 25 daily.   -He takes lasix 20 daily  -If interested in genetic testing I will refer him to [facility].   -Will increase lisino 10 bid; check a BMP in 7-10 days  -C/w coreg 25 bid. Return in about 6 months (around 1/17/2021). I have spent 35 minutes of face to face time with the patient with more than 50% spent counseling and coordinating care. I have personally reviewed the reports and images of labs, radiological studies, cardiac studies including ECG's and telemetry, current and old medical records. The note was completed using EMR and Dragon dictation system. Every effort was made to ensure accuracy; however, inadvertent computerized transcription errors may be present. All questions and concerns were addressed to the patient/family. Alternatives to my treatment were discussed.      I would like to thank you for providing me the opportunity to participate in the care of your patient. If you have any questions, please do not hesitate to contact me.      Jennifer Harp MD, [facility] - [place] [address] Phone: [phone]  Heart Failure Hotline: [phone]  Fax: [phone]

## 2020-08-04 RX ORDER — SPIRONOLACTONE 25 MG/1
TABLET ORAL
Qty: 90 TABLET | Refills: 3 | Status: ON HOLD | OUTPATIENT
Start: 2020-08-04 | End: 2021-06-30 | Stop reason: HOSPADM

## 2020-08-04 RX ORDER — CARVEDILOL 25 MG/1
TABLET ORAL
Qty: 180 TABLET | Refills: 3 | Status: ON HOLD | OUTPATIENT
Start: 2020-08-04 | End: 2021-06-30 | Stop reason: HOSPADM

## 2020-08-04 NOTE — TELEPHONE ENCOUNTER
Requested Prescriptions     Pending Prescriptions Disp Refills    spironolactone (ALDACTONE) 25 MG tablet [Pharmacy Med Name: SPIRONOLACTONE 25 MG TABLET] 90 tablet 3     Sig: TAKE 1 TABLET BY MOUTH EVERY DAY    carvedilol (COREG) 25 MG tablet [Pharmacy Med Name: CARVEDILOL 25 MG TABLET] 180 tablet 3     Sig: TAKE 1 TABLET BY MOUTH TWICE A DAY WITH MEALS                  Last Office Visit: 7/17/2020     Next Office Visit: 1/20/2021

## 2020-12-14 RX ORDER — ALLOPURINOL 100 MG/1
TABLET ORAL
Qty: 90 TABLET | Refills: 1 | Status: SHIPPED | OUTPATIENT
Start: 2020-12-14 | End: 2021-06-10

## 2020-12-22 ENCOUNTER — OFFICE VISIT (OUTPATIENT)
Dept: PRIMARY CARE CLINIC | Age: 60
End: 2020-12-22
Payer: COMMERCIAL

## 2020-12-22 PROCEDURE — 99211 OFF/OP EST MAY X REQ PHY/QHP: CPT | Performed by: NURSE PRACTITIONER

## 2020-12-22 NOTE — PROGRESS NOTES
Roger Banuelos received a viral test for COVID-19. They were educated on isolation and quarantine as appropriate. For any symptoms, they were directed to seek care from their PCP, given contact information to establish with a doctor, directed to an urgent care or the emergency room.

## 2020-12-23 LAB — SARS-COV-2, NAA: NOT DETECTED

## 2021-01-19 ENCOUNTER — TELEPHONE (OUTPATIENT)
Dept: CARDIOLOGY CLINIC | Age: 61
End: 2021-01-19

## 2021-01-19 ENCOUNTER — TELEPHONE (OUTPATIENT)
Dept: INTERNAL MEDICINE CLINIC | Age: 61
End: 2021-01-19

## 2021-01-19 DIAGNOSIS — I50.22 CHRONIC SYSTOLIC HEART FAILURE (HCC): ICD-10-CM

## 2021-01-19 LAB
ANION GAP SERPL CALCULATED.3IONS-SCNC: 11 MMOL/L (ref 3–16)
BUN BLDV-MCNC: 19 MG/DL (ref 7–20)
CALCIUM SERPL-MCNC: 9 MG/DL (ref 8.3–10.6)
CHLORIDE BLD-SCNC: 103 MMOL/L (ref 99–110)
CO2: 26 MMOL/L (ref 21–32)
CREAT SERPL-MCNC: 1.1 MG/DL (ref 0.8–1.3)
GFR AFRICAN AMERICAN: >60
GFR NON-AFRICAN AMERICAN: >60
GLUCOSE BLD-MCNC: 163 MG/DL (ref 70–99)
POTASSIUM SERPL-SCNC: 4.5 MMOL/L (ref 3.5–5.1)
SODIUM BLD-SCNC: 140 MMOL/L (ref 136–145)

## 2021-01-19 NOTE — TELEPHONE ENCOUNTER
Pt called in stated he has some blood orders that are being done today 1-19-21 at [facility]. Pt would like to know if A1C order can be put in with other blood work orders to be done today Tuesday 1-19-21. Pt can be reached at [phone] to address this matter.  Thanks

## 2021-01-19 NOTE — TELEPHONE ENCOUNTER
Cyril with the lab is calling back to follow up on the request to add an A1C to the patients lab orders. Please advise.

## 2021-01-19 NOTE — TELEPHONE ENCOUNTER
Lab informed to call Dr. Finesse Guadarrama office for order for A1C. Number given for Dr. Finesse Guadarrama office.

## 2021-01-20 ENCOUNTER — OFFICE VISIT (OUTPATIENT)
Dept: CARDIOLOGY CLINIC | Age: 61
End: 2021-01-20
Payer: COMMERCIAL

## 2021-01-20 VITALS
HEART RATE: 60 BPM | WEIGHT: 240.2 LBS | SYSTOLIC BLOOD PRESSURE: 138 MMHG | BODY MASS INDEX: 36.52 KG/M2 | DIASTOLIC BLOOD PRESSURE: 78 MMHG

## 2021-01-20 DIAGNOSIS — I42.8 NONISCHEMIC CARDIOMYOPATHY (HCC): ICD-10-CM

## 2021-01-20 DIAGNOSIS — R73.9 HYPERGLYCEMIA: Primary | ICD-10-CM

## 2021-01-20 DIAGNOSIS — I50.22 CHRONIC SYSTOLIC HEART FAILURE (HCC): Primary | ICD-10-CM

## 2021-01-20 DIAGNOSIS — I10 HYPERTENSION, BENIGN: ICD-10-CM

## 2021-01-20 LAB
ESTIMATED AVERAGE GLUCOSE: 142.7 MG/DL
HBA1C MFR BLD: 6.6 %

## 2021-01-20 PROCEDURE — 99214 OFFICE O/P EST MOD 30 MIN: CPT | Performed by: INTERNAL MEDICINE

## 2021-01-20 NOTE — PROGRESS NOTES
Cc: HFrEF due to [facility] (post viral vs hereditary)    HPI:     Mr Daniel Shepard is a 60 yo man with h/o HFrEF due to [facility] (EF 20% in 2016), untreated VERN, HTN, overweight, HLP, psoriasis.      Echo 07/2016: LVEDD 6.1, EF 20%, normal thickness.      LHC 08/2016: no CAD, EF 25-30%.    Echo 10/2016: LVEDD 5.7, EF 25-30%.      MUGA 08/2017: EF 47%.     Echo 01/2019: LVEDD 5.9, EF 30%, normal thickness, normal RV and mild valve disease.      Ca score 0 (zero) 01/2019.     He is very interested in finding out if there are any other potential causes for his cardiomyopathy which could be treatable, because he \"does not want only to treat symptoms\" with meds. Hence, the last time we ordered a CMR and blood tests to r/o causes of [facility].      NICMO labs: serum free light chain assay, ferritin, MELY, ACE and TSH within normal limits, CRP mildly elevated. BMP normal.      CMR at [place] [address] 2/14/19: LV dilated, LVEF 40%, mild LVH, no LGE (hence no scar), normal ECV (hence no inflammation), normal RV size and function, normal valves and pericardium.      ECG 1/24/19: sinus mik 59, nonspecific changes,  ms.      Patient returns for a follow up. He reports no complaints, remains active. He reports some fatigue only when inactive. Histories     Past Medical History:   has a past medical history of Arthritis, CHF (congestive heart failure) ([address] CHF (congestive heart failure) ([address] Hypertension, benign, Kidney stone, Lipoma, Other drug allergy(995.27), Psoriasis, Thyroid disease, and Unspecified sleep apnea. Surgical History:   has a past surgical history that includes Tonsillectomy; hernia repair; lipoma resection; joint replacement (10/27/2017); and other surgical history (Left, 10/27/2017). Social History:   reports that he has never smoked. He has never used smokeless tobacco. He reports current alcohol use. He reports that he does not use drugs.      Family History:  No evidence for sudden cardiac death or premature CAD Medications:     Home medications were reviewed and are listed below    Prior to Admission medications    Medication Sig Start Date End Date Taking? Authorizing Provider   allopurinol (ZYLOPRIM) 100 MG tablet TAKE 1 TABLET BY MOUTH EVERY DAY 12/14/20  Yes Nadeen Manriquez MD   spironolactone (ALDACTONE) 25 MG tablet TAKE 1 TABLET BY MOUTH EVERY DAY 8/4/20  Yes Bernarda Muñoz MD   carvedilol (COREG) 25 MG tablet TAKE 1 TABLET BY MOUTH TWICE A DAY WITH MEALS 8/4/20  Yes Bernarda Muñoz MD   lisinopril (PRINIVIL;ZESTRIL) 10 MG tablet Take 1 tablet by mouth 2 times daily 7/17/20  Yes Bernarda Muñoz MD   furosemide (LASIX) 20 MG tablet Take 1 tablet by mouth daily 7/17/20  Yes Bernarda Muñoz MD   clobetasol (TEMOVATE) 0.05 % ointment APPLY EXTERNALLY TO THE AFFECTED AREA TWICE DAILY 6/16/20  Yes Nadeen Manriquez MD   magnesium 30 MG tablet Take 400 mg by mouth daily    Yes Historical Provider, MD   Zinc Sulfate (ZINC 15 PO) Take by mouth   Yes Historical Provider, MD   vitamin E 400 UNIT capsule Take 400 Units by mouth daily   Yes Historical Provider, MD   Coenzyme Q10 (COQ10) 100 MG CAPS Take 4 tablets by mouth    Yes Historical Provider, MD   Glucosamine-Chondroit-Vit C-Mn (GLUCOSAMINE 1500 COMPLEX PO) Take by mouth   Yes Historical Provider, MD   Omega-3 Fatty Acids (FISH OIL) 1200 MG CAPS Take by mouth 3 tablets once a week   Yes Historical Provider, MD   B Complex Vitamins (VITAMIN-B COMPLEX) TABS Take by mouth   Yes Historical Provider, MD   magnesium citrate solution Take 296 mLs by mouth once   Yes Historical Provider, MD   vitamin D (CHOLECALCIFEROL) 400 UNIT TABS tablet Take 400 Units by mouth 2 times daily.  8/18/10  Yes Historical Provider, MD          Allergy:     Doxycycline, Hydrocodone, Iodides, Ketorolac tromethamine, Naproxen, Norco [hydrocodone-acetaminophen], Oxycodone-acetaminophen, Percocet [oxycodone-acetaminophen], and Clindamycin/lincomycin       Review of Systems: All 12 point review of symptoms completed. Pertinent positives identified in the HPI, all other review of symptoms negative as below. CONSTITUTIONAL: No fatigue  SKIN: No rash or pruritis. EYES: No visual changes or diplopia. No scleral icterus. ENT: No Headaches, hearing loss or vertigo. No mouth sores or sore throat. CARDIOVASCULAR: No chest pain/chest pressure/chest discomfort. No palpitations. No edema. RESPIRATORY: No dyspnea. No cough or wheezing, no sputum production. GASTROINTESTINAL: No N/V/D. No abdominal pain, appetite loss, blood in stools. GENITOURINARY: No dysuria, trouble voiding, or hematuria. MUSCULOSKELETAL:  No gait disturbance, weakness or joint complaints. NEUROLOGICAL: No headache, diplopia, change in muscle strength, numbness or tingling. No change in gait, balance, coordination, mood, affect, memory, mentation, behavior. PSHYCH: No anxiety, loss of interest, change in sexual behavior, feelings of self-harm, or confusion. ENDOCRINE: No excessive thirst, fluid intake, or urination. No tremor. HEMATOLOGIC: No abnormal bruising or bleeding. ALLERGY: No nasal congestion or hives.       Physical Examination:     Vitals:    01/20/21 1542   BP: 138/78   Pulse: 60   Weight: 240 lb 3.2 oz (109 kg)       Wt Readings from Last 3 Encounters:   01/20/21 240 lb 3.2 oz (109 kg)   07/17/20 232 lb 3.2 oz (105.3 kg)   05/27/20 241 lb (109.3 kg)         General Appearance:  Alert, cooperative, no distress, appears stated age Appropriate weight   Head:  Normocephalic, without obvious abnormality, atraumatic   Eyes:  PERRL, conjunctiva/corneas clear EOM intact  Ears normal   Throat no lesions       Nose: Nares normal, no drainage or sinus tenderness   Throat: Lips, mucosa, and tongue normal   Neck: Supple, symmetrical, trachea midline, no adenopathy, thyroid: not enlarged, symmetric, no tenderness/mass/nodules, no carotid bruit Lungs:   Clear to auscultation bilaterally, respirations unlabored   Chest Wall:  No tenderness or deformity   Heart:  Regular rhythm, rate is controlled, S1, S2 normal, there is no murmur, there is no rub or gallop, no jvd, no bilateral lower extremity edema   Abdomen:   Soft, non-tender, bowel sounds active all four quadrants,  no masses, no organomegaly       Extremities: Extremities normal, atraumatic, no cyanosis   Pulses: 2+ and symmetric   Skin: Skin color, texture, turgor normal, no rashes or lesions   Pysch: Normal mood and affect   Neurologic: Normal gross motor and sensory exam.  Cranial nerves intact        Labs:     Lab Results   Component Value Date    WBC 9.8 05/27/2020    HGB 15.1 05/27/2020    HCT 43.8 05/27/2020    MCV 90.0 05/27/2020     05/27/2020     Lab Results   Component Value Date     01/19/2021    K 4.5 01/19/2021     01/19/2021    CO2 26 01/19/2021    BUN 19 01/19/2021    CREATININE 1.1 01/19/2021    GLUCOSE 163 (H) 01/19/2021    CALCIUM 9.0 01/19/2021    PROT 7.2 05/27/2020    LABALBU 4.4 05/27/2020    BILITOT 0.6 05/27/2020    ALKPHOS 102 05/27/2020    AST 21 05/27/2020    ALT 29 05/27/2020    LABGLOM >60 01/19/2021    GFRAA >60 01/19/2021    AGRATIO 1.6 05/27/2020    GLOB 2.8 05/27/2020         Lab Results   Component Value Date    CHOL 180 05/27/2020    CHOL 174 11/30/2018    CHOL 200 (H) 08/15/2017     Lab Results   Component Value Date    TRIG 175 (H) 05/27/2020    TRIG 133 11/30/2018    TRIG 241 (H) 08/15/2017     Lab Results   Component Value Date    HDL 41 05/27/2020    HDL 33 (L) 11/30/2018    HDL 33 (L) 08/15/2017     Lab Results   Component Value Date    LDLCHOLESTEROL 128 02/20/2008    LDLCALC 104 (H) 05/27/2020    LDLCALC 114 (H) 11/30/2018    LDLCALC 119 (H) 08/15/2017     Lab Results   Component Value Date    LABVLDL 35 05/27/2020    LABVLDL 27 11/30/2018    LABVLDL 48 08/15/2017     No results found for: [facility]    Lab Results Component Value Date    INR 0.98 10/10/2017    INR 1.05 08/02/2016    PROTIME 11.1 10/10/2017    PROTIME 12.0 08/02/2016       The 10-year ASCVD risk score (Belén Ingram et al., 2013) is: 13.9%    Values used to calculate the score:      Age: 56 years      Sex: Male      Is Non- : No      Diabetic: No      Tobacco smoker: No      Systolic Blood Pressure: 343 mmHg      Is BP treated: Yes      HDL Cholesterol: 41 mg/dL      Total Cholesterol: 180 mg/dL      Assessment / Plan:      Diagnosis Orders   1. Chronic systolic heart failure (HCC)  ECHO Limited   2. Nonischemic cardiomyopathy ([address]  ECHO Limited   3. Hypertension, benign          1. 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 His NYHA FC is I, stage C and appears euvolemic and hemo stable. Cardiac MR suggests dilated cardiomyopathy of unknown origin (could be viral or genetic), no evidence of scar or active inflammation, LVEF improved, normal RV.      -I advised him to seek treatment for his VERN which could be contributing to his CMO.   -LVEF was more accurately measured by CMR and is 40%, most likely improved on HF meds. He does not need an ICD at this time. -C/w spironolactone 25 daily.   -He takes lasix 20 daily  -If interested in genetic testing I will refer [facility] to [facility] [place] [facility]".    -Cw lisino 10 bid  -C/w coreg 25 bid.   -Will repeat limited echo to reassess LVEF on GDMT. Return in about 6 months (around 7/20/2021). I have spent 35 minutes of face to face time with the patient with more than 50% spent counseling and coordinating care.

## 2021-04-23 ENCOUNTER — OFFICE VISIT (OUTPATIENT)
Dept: INTERNAL MEDICINE CLINIC | Age: 61
End: 2021-04-23
Payer: COMMERCIAL

## 2021-04-23 VITALS
HEART RATE: 53 BPM | HEIGHT: 68 IN | DIASTOLIC BLOOD PRESSURE: 88 MMHG | WEIGHT: 238 LBS | BODY MASS INDEX: 36.07 KG/M2 | SYSTOLIC BLOOD PRESSURE: 169 MMHG

## 2021-04-23 DIAGNOSIS — I10 ESSENTIAL HYPERTENSION: ICD-10-CM

## 2021-04-23 DIAGNOSIS — I50.22 CHRONIC CLINICAL SYSTOLIC HEART FAILURE (HCC): ICD-10-CM

## 2021-04-23 DIAGNOSIS — E11.69 DIABETES MELLITUS TYPE 2 IN OBESE (HCC): ICD-10-CM

## 2021-04-23 DIAGNOSIS — L40.9 PSORIASIS: ICD-10-CM

## 2021-04-23 DIAGNOSIS — F32.A MILD DEPRESSION: ICD-10-CM

## 2021-04-23 DIAGNOSIS — E66.9 DIABETES MELLITUS TYPE 2 IN OBESE (HCC): ICD-10-CM

## 2021-04-23 DIAGNOSIS — Z00.00 ANNUAL PHYSICAL EXAM: Primary | ICD-10-CM

## 2021-04-23 DIAGNOSIS — M1A.0720 IDIOPATHIC CHRONIC GOUT OF LEFT FOOT WITHOUT TOPHUS: ICD-10-CM

## 2021-04-23 PROCEDURE — 99396 PREV VISIT EST AGE 40-64: CPT | Performed by: HOSPITALIST

## 2021-04-23 RX ORDER — LOSARTAN POTASSIUM 50 MG/1
50 TABLET ORAL 2 TIMES DAILY
Qty: 60 TABLET | Refills: 5 | Status: SHIPPED | OUTPATIENT
Start: 2021-04-23 | End: 2021-05-24

## 2021-04-23 SDOH — ECONOMIC STABILITY: TRANSPORTATION INSECURITY
IN THE PAST 12 MONTHS, HAS THE LACK OF TRANSPORTATION KEPT YOU FROM MEDICAL APPOINTMENTS OR FROM GETTING MEDICATIONS?: NO

## 2021-04-23 ASSESSMENT — PATIENT HEALTH QUESTIONNAIRE - PHQ9
4. FEELING TIRED OR HAVING LITTLE ENERGY: 1
SUM OF ALL RESPONSES TO PHQ QUESTIONS 1-9: 0
2. FEELING DOWN, DEPRESSED OR HOPELESS: 3
SUM OF ALL RESPONSES TO PHQ QUESTIONS 1-9: 0
SUM OF ALL RESPONSES TO PHQ9 QUESTIONS 1 & 2: 0
SUM OF ALL RESPONSES TO PHQ QUESTIONS 1-9: 10
3. TROUBLE FALLING OR STAYING ASLEEP: 2
2. FEELING DOWN, DEPRESSED OR HOPELESS: 0
9. THOUGHTS THAT YOU WOULD BE BETTER OFF DEAD, OR OF HURTING YOURSELF: 0
7. TROUBLE CONCENTRATING ON THINGS, SUCH AS READING THE NEWSPAPER OR WATCHING TELEVISION: 0
1. LITTLE INTEREST OR PLEASURE IN DOING THINGS: 0
8. MOVING OR SPEAKING SO SLOWLY THAT OTHER PEOPLE COULD HAVE NOTICED. OR THE OPPOSITE, BEING SO FIGETY OR RESTLESS THAT YOU HAVE BEEN MOVING AROUND A LOT MORE THAN USUAL: 0
6. FEELING BAD ABOUT YOURSELF - OR THAT YOU ARE A FAILURE OR HAVE LET YOURSELF OR YOUR FAMILY DOWN: 1
SUM OF ALL RESPONSES TO PHQ9 QUESTIONS 1 & 2: 4

## 2021-04-23 ASSESSMENT — COLUMBIA-SUICIDE SEVERITY RATING SCALE - C-SSRS
1. WITHIN THE PAST MONTH, HAVE YOU WISHED YOU WERE DEAD OR WISHED YOU COULD GO TO SLEEP AND NOT WAKE UP?: NO
2. HAVE YOU ACTUALLY HAD ANY THOUGHTS OF KILLING YOURSELF?: NO

## 2021-04-23 ASSESSMENT — ENCOUNTER SYMPTOMS: SHORTNESS OF BREATH: 1

## 2021-04-23 NOTE — PROGRESS NOTES
Annual Physical Examination    Patient:  Bernabe Mendez                                               : 1960  Age: 56 y.o. MRN: <[id number]>  Date : 2021    History Obtained From:  patient      OF PRESENT ILLNESS:   The patient is a 56 y.o. male who presents for annual physical examination. Reports generalized fatigue/tiredness. Reports dyspnea with moderatesevere exertion. He also reports episodes of chest pain with moderatesevere exertion which improve usually with rest.  Feels somewhat depressed because of his chronic medical issues. Reports having psoriasis which, in his opinion, is worse with chronic use of oral lisinopril and carvedilol. No recent episodes of kidney stones. Past Medical History:        Diagnosis Date    Arthritis     CHF (congestive heart failure) ([address]     [facility]congest[facility] failure) [facility])     Hypertension, benign     Kidney stone     Lipoma     removal righy shoulder    Other drug allergy(995.27)     Psoriasis     pt states it is worse since being on Metoprolol    Thyroid disease     enlarged    Unspecified sleep apnea     no CPAP. Has not done a sleep study       Past Surgical History:        Procedure Laterality Date    HERNIA REPAIR      right     JOINT REPLACEMENT  10/27/2017    left shoulder    LIPOMA RESECTION      OTHER SURGICAL HISTORY Left 10/27/2017    LEFT SHOULDER RESURFACTING HEMIARTHROPLASTY, OPEN BICEPS    TONSILLECTOMY         Family History:       Problem Relation Age of Onset    Other Mother         DJD    Other Father         idiopathic cardiomyopathy       Social History:   TOBACCO:   reports that he has never smoked. He has never used smokeless tobacco.  ETOH:   reports current alcohol use. OCCUPATION:  RN by education.  with 2 daughters. Takes care of his activities of daily living.     Allergies:  Doxycycline, Hydrocodone, Iodides, Ketorolac tromethamine, Naproxen, Norco Positive for shortness of breath (moderate-severe exertion). Cardiovascular: Positive for chest pain (with moderate severe exertion). Negative for palpitations. Gastrointestinal:        Episodes of heartburn if he eats late in the evening   Endocrine: Positive for cold intolerance. Skin: Positive for rash (psoriasis). Allergic/Immunologic: Positive for food allergies (red inions, yeast). Neurological: Negative. Psychiatric/Behavioral: Positive for dysphoric mood and sleep disturbance. Physical Exam:      Vitals: BP (!) 169/88   Pulse 53   Ht 5' 8\" (1.727 m)   Wt 238 lb (108 kg)   BMI 36.19 kg/m²     Body mass index is 36.19 kg/m². Wt Readings from Last 3 Encounters:   04/23/21 238 lb (108 kg)   01/20/21 240 lb 3.2 oz (109 kg)   07/17/20 232 lb 3.2 oz (105.3 kg)     Physical Exam  Vitals signs and nursing note reviewed. Constitutional:       General: He is not in acute distress. Appearance: Normal appearance. He is well-developed. HENT:      Head: Normocephalic and atraumatic. Right Ear: Tympanic membrane, ear canal and external ear normal. There is no impacted cerumen. Left Ear: Tympanic membrane, ear canal and external ear normal. There is no impacted cerumen. Nose:      Comments: Nasal mucosa is mildly swollen/inflamed     Mouth/Throat:      Pharynx: No oropharyngeal exudate. Eyes:      General: No scleral icterus. Pupils: Pupils are equal, round, and reactive to light. Neck:      Musculoskeletal: Normal range of motion. Vascular: No JVD. Cardiovascular:      Rate and Rhythm: Normal rate and regular rhythm. Heart sounds: Normal heart sounds. No murmur. No friction rub. No gallop. Pulmonary:      Effort: Pulmonary effort is normal. No respiratory distress. Breath sounds: Normal breath sounds. No wheezing or rales. Abdominal:      General: Bowel sounds are normal. There is no distension. Palpations: Abdomen is soft.       Tenderness: There is no abdominal tenderness. There is no right CVA tenderness or left CVA tenderness. Musculoskeletal: Normal range of motion. Right lower leg: No edema. Left lower leg: No edema. Skin:     General: Skin is warm and dry. Comments: Psoriatec plaques on the distal surface of his both hands, forearms, shins, and ankles   Neurological:      Mental Status: He is alert and oriented to person, place, and time. Cranial Nerves: No cranial nerve deficit. Psychiatric:         Mood and Affect: Mood normal.       HealthMaintenance:   Needs to have colonoscopy and urine microalbuminuria test in near future   Assessment/Plan:   Sharath Joe was seen today for annual exam.    Diagnoses and all orders for this visit:    Annual physical exam    Chronic clinical systolic heart failure ([address]  -     ECHO Complete 2D W Doppler W Color  -     Start losartan (COZAAR) 50 MG tablet; Take 1 tablet by mouth 2 times daily  -     Discontinue lisinopril  -     Continue the other medications; encouraged to adhere to low-sodium diet    Essential hypertension  -     losartan (COZAAR) 50 MG tablet; Take 1 tablet by mouth 2 times daily  -     Continue lisinopril; adhere to low-sodium diet  -     Continue the other scheduled medications    Diabetes mellitus type 2 in obese [facility])  -     External Referral to Endocrinology  -     Continue to adhere to carb controlled diet    Idiopathic chronic gout of left foot without tophus        -    Continue allopurinol        -    Encouraged to adhere to low purine diet    Psoriasis        -    Clobetasol cream applications    Mild depression ([facility]        -    Does not want to take any new medications        No follow-ups on file.      Inocencia Kurtz M.D.   4/23/2021, 2:12 PM

## 2021-05-17 DIAGNOSIS — R21 SKIN RASH: ICD-10-CM

## 2021-05-18 RX ORDER — CLOBETASOL PROPIONATE 0.5 MG/G
OINTMENT TOPICAL
Qty: 60 G | Refills: 2 | Status: SHIPPED | OUTPATIENT
Start: 2021-05-18

## 2021-05-24 DIAGNOSIS — I10 ESSENTIAL HYPERTENSION: ICD-10-CM

## 2021-05-24 DIAGNOSIS — I50.22 CHRONIC CLINICAL SYSTOLIC HEART FAILURE (HCC): ICD-10-CM

## 2021-05-24 RX ORDER — LOSARTAN POTASSIUM 50 MG/1
50 TABLET ORAL 2 TIMES DAILY
Qty: 180 TABLET | Refills: 3 | Status: ON HOLD | OUTPATIENT
Start: 2021-05-24 | End: 2021-06-30 | Stop reason: HOSPADM

## 2021-06-09 DIAGNOSIS — M1A.0720 IDIOPATHIC CHRONIC GOUT OF LEFT FOOT WITHOUT TOPHUS: ICD-10-CM

## 2021-06-10 RX ORDER — ALLOPURINOL 100 MG/1
TABLET ORAL
Qty: 90 TABLET | Refills: 1 | Status: SHIPPED | OUTPATIENT
Start: 2021-06-10 | End: 2021-10-21

## 2021-06-27 ENCOUNTER — APPOINTMENT (OUTPATIENT)
Dept: GENERAL RADIOLOGY | Age: 61
DRG: 287 | End: 2021-06-27
Payer: COMMERCIAL

## 2021-06-27 ENCOUNTER — HOSPITAL ENCOUNTER (INPATIENT)
Age: 61
LOS: 3 days | Discharge: HOME | DRG: 287 | End: 2021-06-30
Attending: EMERGENCY MEDICINE | Admitting: HOSPITALIST
Payer: COMMERCIAL

## 2021-06-27 DIAGNOSIS — R07.9 CHEST PAIN, UNSPECIFIED TYPE: Primary | ICD-10-CM

## 2021-06-27 DIAGNOSIS — N17.9 AKI (ACUTE KIDNEY INJURY) (HCC): ICD-10-CM

## 2021-06-27 LAB
ANION GAP SERPL CALCULATED.3IONS-SCNC: 13 MMOL/L (ref 3–16)
BASOPHILS ABSOLUTE: 0.1 K/UL (ref 0–0.2)
BASOPHILS RELATIVE PERCENT: 0.6 %
BUN BLDV-MCNC: 25 MG/DL (ref 7–20)
CALCIUM SERPL-MCNC: 9.9 MG/DL (ref 8.3–10.6)
CHLORIDE BLD-SCNC: 98 MMOL/L (ref 99–110)
CO2: 23 MMOL/L (ref 21–32)
CREAT SERPL-MCNC: 2.2 MG/DL (ref 0.8–1.3)
EOSINOPHILS ABSOLUTE: 0.4 K/UL (ref 0–0.6)
EOSINOPHILS RELATIVE PERCENT: 3.2 %
GFR AFRICAN AMERICAN: 37
GFR NON-AFRICAN AMERICAN: 30
GLUCOSE BLD-MCNC: 175 MG/DL (ref 70–99)
HCT VFR BLD CALC: 44 % (ref 40.5–52.5)
HEMOGLOBIN: 15.4 G/DL (ref 13.5–17.5)
LYMPHOCYTES ABSOLUTE: 2.1 K/UL (ref 1–5.1)
LYMPHOCYTES RELATIVE PERCENT: 15.3 %
MCH RBC QN AUTO: 31.3 PG (ref 26–34)
MCHC RBC AUTO-ENTMCNC: 35 G/DL (ref 31–36)
MCV RBC AUTO: 89.4 FL (ref 80–100)
MONOCYTES ABSOLUTE: 0.9 K/UL (ref 0–1.3)
MONOCYTES RELATIVE PERCENT: 6.5 %
NEUTROPHILS ABSOLUTE: 10.3 K/UL (ref 1.7–7.7)
NEUTROPHILS RELATIVE PERCENT: 74.4 %
PDW BLD-RTO: 13.3 % (ref 12.4–15.4)
PLATELET # BLD: 181 K/UL (ref 135–450)
PMV BLD AUTO: 8.3 FL (ref 5–10.5)
POTASSIUM REFLEX MAGNESIUM: 5.9 MMOL/L (ref 3.5–5.1)
POTASSIUM SERPL-SCNC: 4.4 MMOL/L (ref 3.5–5.1)
PRO-BNP: 154 PG/ML (ref 0–124)
RBC # BLD: 4.92 M/UL (ref 4.2–5.9)
SODIUM BLD-SCNC: 134 MMOL/L (ref 136–145)
SODIUM URINE: 78 MMOL/L
TROPONIN: <0.01 NG/ML
WBC # BLD: 13.8 K/UL (ref 4–11)

## 2021-06-27 PROCEDURE — 93005 ELECTROCARDIOGRAM TRACING: CPT | Performed by: PHYSICIAN ASSISTANT

## 2021-06-27 PROCEDURE — 85025 COMPLETE CBC W/AUTO DIFF WBC: CPT

## 2021-06-27 PROCEDURE — 2580000003 HC RX 258: Performed by: STUDENT IN AN ORGANIZED HEALTH CARE EDUCATION/TRAINING PROGRAM

## 2021-06-27 PROCEDURE — 84300 ASSAY OF URINE SODIUM: CPT

## 2021-06-27 PROCEDURE — 84484 ASSAY OF TROPONIN QUANT: CPT

## 2021-06-27 PROCEDURE — 82570 ASSAY OF URINE CREATININE: CPT

## 2021-06-27 PROCEDURE — 83880 ASSAY OF NATRIURETIC PEPTIDE: CPT

## 2021-06-27 PROCEDURE — 6360000002 HC RX W HCPCS: Performed by: STUDENT IN AN ORGANIZED HEALTH CARE EDUCATION/TRAINING PROGRAM

## 2021-06-27 PROCEDURE — 84540 ASSAY OF URINE/UREA-N: CPT

## 2021-06-27 PROCEDURE — 2580000003 HC RX 258: Performed by: PHYSICIAN ASSISTANT

## 2021-06-27 PROCEDURE — 36415 COLL VENOUS BLD VENIPUNCTURE: CPT

## 2021-06-27 PROCEDURE — 99283 EMERGENCY DEPT VISIT LOW MDM: CPT

## 2021-06-27 PROCEDURE — 80048 BASIC METABOLIC PNL TOTAL CA: CPT

## 2021-06-27 PROCEDURE — 84132 ASSAY OF SERUM POTASSIUM: CPT

## 2021-06-27 PROCEDURE — 6370000000 HC RX 637 (ALT 250 FOR IP): Performed by: STUDENT IN AN ORGANIZED HEALTH CARE EDUCATION/TRAINING PROGRAM

## 2021-06-27 PROCEDURE — 71046 X-RAY EXAM CHEST 2 VIEWS: CPT

## 2021-06-27 PROCEDURE — 1200000000 HC SEMI PRIVATE

## 2021-06-27 RX ORDER — POLYETHYLENE GLYCOL 3350 17 G/17G
17 POWDER, FOR SOLUTION ORAL DAILY PRN
Status: DISCONTINUED | OUTPATIENT
Start: 2021-06-27 | End: 2021-06-30 | Stop reason: HOSPADM

## 2021-06-27 RX ORDER — SODIUM CHLORIDE 0.9 % (FLUSH) 0.9 %
5-40 SYRINGE (ML) INJECTION EVERY 12 HOURS SCHEDULED
Status: DISCONTINUED | OUTPATIENT
Start: 2021-06-27 | End: 2021-06-30 | Stop reason: HOSPADM

## 2021-06-27 RX ORDER — ACETAMINOPHEN 650 MG/1
650 SUPPOSITORY RECTAL EVERY 6 HOURS PRN
Status: DISCONTINUED | OUTPATIENT
Start: 2021-06-27 | End: 2021-06-30 | Stop reason: HOSPADM

## 2021-06-27 RX ORDER — SODIUM CHLORIDE, SODIUM LACTATE, POTASSIUM CHLORIDE, CALCIUM CHLORIDE 600; 310; 30; 20 MG/100ML; MG/100ML; MG/100ML; MG/100ML
1000 INJECTION, SOLUTION INTRAVENOUS ONCE
Status: COMPLETED | OUTPATIENT
Start: 2021-06-27 | End: 2021-06-27

## 2021-06-27 RX ORDER — CARVEDILOL 25 MG/1
25 TABLET ORAL 2 TIMES DAILY WITH MEALS
Status: DISCONTINUED | OUTPATIENT
Start: 2021-06-27 | End: 2021-06-27

## 2021-06-27 RX ORDER — ONDANSETRON 2 MG/ML
4 INJECTION INTRAMUSCULAR; INTRAVENOUS EVERY 6 HOURS PRN
Status: DISCONTINUED | OUTPATIENT
Start: 2021-06-27 | End: 2021-06-30 | Stop reason: HOSPADM

## 2021-06-27 RX ORDER — ASPIRIN 81 MG/1
81 TABLET, CHEWABLE ORAL DAILY
Status: DISCONTINUED | OUTPATIENT
Start: 2021-06-28 | End: 2021-06-30 | Stop reason: HOSPADM

## 2021-06-27 RX ORDER — NITROGLYCERIN 0.4 MG/1
0.4 TABLET SUBLINGUAL EVERY 5 MIN PRN
Status: DISCONTINUED | OUTPATIENT
Start: 2021-06-27 | End: 2021-06-30 | Stop reason: HOSPADM

## 2021-06-27 RX ORDER — ONDANSETRON 4 MG/1
4 TABLET, ORALLY DISINTEGRATING ORAL EVERY 8 HOURS PRN
Status: DISCONTINUED | OUTPATIENT
Start: 2021-06-27 | End: 2021-06-30 | Stop reason: HOSPADM

## 2021-06-27 RX ORDER — SODIUM CHLORIDE 9 MG/ML
25 INJECTION, SOLUTION INTRAVENOUS PRN
Status: DISCONTINUED | OUTPATIENT
Start: 2021-06-27 | End: 2021-06-30 | Stop reason: HOSPADM

## 2021-06-27 RX ORDER — SODIUM CHLORIDE 0.9 % (FLUSH) 0.9 %
5-40 SYRINGE (ML) INJECTION PRN
Status: DISCONTINUED | OUTPATIENT
Start: 2021-06-27 | End: 2021-06-30 | Stop reason: HOSPADM

## 2021-06-27 RX ORDER — ALLOPURINOL 100 MG/1
100 TABLET ORAL DAILY
Status: DISCONTINUED | OUTPATIENT
Start: 2021-06-28 | End: 2021-06-30 | Stop reason: HOSPADM

## 2021-06-27 RX ORDER — ACETAMINOPHEN 325 MG/1
650 TABLET ORAL EVERY 6 HOURS PRN
Status: DISCONTINUED | OUTPATIENT
Start: 2021-06-27 | End: 2021-06-30 | Stop reason: HOSPADM

## 2021-06-27 RX ORDER — HEPARIN SODIUM 5000 [USP'U]/ML
5000 INJECTION, SOLUTION INTRAVENOUS; SUBCUTANEOUS EVERY 8 HOURS SCHEDULED
Status: DISCONTINUED | OUTPATIENT
Start: 2021-06-27 | End: 2021-06-30 | Stop reason: HOSPADM

## 2021-06-27 RX ORDER — ATORVASTATIN CALCIUM 40 MG/1
40 TABLET, FILM COATED ORAL NIGHTLY
Status: DISCONTINUED | OUTPATIENT
Start: 2021-06-27 | End: 2021-06-30 | Stop reason: HOSPADM

## 2021-06-27 RX ADMIN — Medication 5 ML: at 23:00

## 2021-06-27 RX ADMIN — ASPIRIN 325 MG: 325 TABLET, COATED ORAL at 22:59

## 2021-06-27 RX ADMIN — HEPARIN SODIUM 5000 UNITS: 5000 INJECTION INTRAVENOUS; SUBCUTANEOUS at 22:59

## 2021-06-27 RX ADMIN — SODIUM CHLORIDE, POTASSIUM CHLORIDE, SODIUM LACTATE AND CALCIUM CHLORIDE 1000 ML: 600; 310; 30; 20 INJECTION, SOLUTION INTRAVENOUS at 20:48

## 2021-06-27 ASSESSMENT — ENCOUNTER SYMPTOMS
DIARRHEA: 0
VOMITING: 0
NAUSEA: 1
SHORTNESS OF BREATH: 0
ABDOMINAL PAIN: 0
CHEST TIGHTNESS: 0
BACK PAIN: 0

## 2021-06-27 ASSESSMENT — PAIN SCALES - GENERAL
PAINLEVEL_OUTOF10: 0

## 2021-06-27 NOTE — ED TRIAGE NOTES
Pt complains of some chest pressure in his midsternal area after mowing the grass today at 1700. Pt states he had some nausea with the pressure and states it lasted 30 seconds. Pt denies any sob. Pt states the cp has mostly resolved.

## 2021-06-27 NOTE — ED PROVIDER NOTES
[facility]          PHYSICIAN ASSISTANT NOTE       Date of evaluation: 6/27/2021    Chief Complaint     Chest Pain (pt complains of a 30 second episode of chest pressure with nausea at 1700. cp resolved at this time.)      History of Present Illness     Haile Bhatia is a 56 y.o. male with a PMH of CHF, HTN who presents chest pain and nausea. Patient states that today he was outside mowing his yard for approximately 3 hours. He states he was asymptomatic while mowing his yard. After he was finished, he came inside and began drinking some water. He then developed a chest tightness in the center of his chest with associated nausea. This lasted for approximately 30 seconds. This episode happened at approximately 5 PM.  He states ever since he has had very mild stabbing sensations in the center of his chest since, however nothing like his initial symptoms. Patient states he is feeling well at this time, however wanted to come in to be evaluated. He states his last stress test was in 2016. Review of Systems     Review of Systems   Constitutional: Negative for activity change, chills and fever. Eyes: Negative for visual disturbance. Respiratory: Negative for chest tightness and shortness of breath. Cardiovascular: Positive for chest pain. Negative for palpitations. Gastrointestinal: Positive for nausea. Negative for abdominal pain, diarrhea and vomiting. Genitourinary: Negative for difficulty urinating. Musculoskeletal: Negative for back pain. Neurological: Negative for dizziness and headaches. Psychiatric/Behavioral: Negative for agitation. Past Medical, Surgical, Family, and Social History     He has a past medical history of Arthritis, CHF (congestive heart failure) ([address] CHF (congestive heart failure) ([address] Hypertension, benign, Kidney stone, Lipoma, Other drug allergy(995.27), Psoriasis, Thyroid disease, and Unspecified sleep apnea.   He and atraumatic. Nose: Nose normal.   Eyes:      Extraocular Movements: Extraocular movements intact. Pupils: Pupils are equal, round, and reactive to light. Cardiovascular:      Rate and Rhythm: Normal rate and regular rhythm. Pulmonary:      Effort: Pulmonary effort is normal. No respiratory distress. Breath sounds: Normal breath sounds. Abdominal:      General: There is no distension. Palpations: Abdomen is soft. Tenderness: There is no abdominal tenderness. Musculoskeletal:         General: Normal range of motion. Cervical back: Normal range of motion. Skin:     General: Skin is warm and dry. Neurological:      General: No focal deficit present. Mental Status: He is alert and oriented to person, place, and time. Psychiatric:         Mood and Affect: Mood normal.         Behavior: Behavior normal.         Diagnostic Results     EKG   Interpreted in conjunction with emergency department physician No att. providers found  Rhythm: normal sinus   Rate: normal  Axis: normal  Ectopy: none  Conduction: normal  ST Segments: no acute change  T Waves: no acute change  Q Waves:nonspecific  Clinical Impression: no acute changes  Comparison:  1/24/2019    RADIOLOGY:  XR CHEST (2 VW)   Final Result      No evidence of acute cardiopulmonary disease.                 LABS:   Results for orders placed or performed during the hospital encounter of 06/27/21   CBC Auto Differential   Result Value Ref Range    WBC 13.8 (H) 4.0 - 11.0 K/uL    RBC 4.92 4.20 - 5.90 M/uL    Hemoglobin 15.4 13.5 - 17.5 g/dL    Hematocrit 44.0 40.5 - 52.5 %    MCV 89.4 80.0 - 100.0 fL    MCH 31.3 26.0 - 34.0 pg    MCHC 35.0 31.0 - 36.0 g/dL    RDW 13.3 12.4 - 15.4 %    Platelets 040 681 - 140 K/uL    MPV 8.3 5.0 - 10.5 fL    Neutrophils % 74.4 %    Lymphocytes % 15.3 %    Monocytes % 6.5 %    Eosinophils % 3.2 %    Basophils % 0.6 %    Neutrophils Absolute 10.3 (H) 1.7 - 7.7 K/uL    Lymphocytes Absolute 2.1 1.0 - 5.1 K/uL    Monocytes Absolute 0.9 0.0 - 1.3 K/uL    Eosinophils Absolute 0.4 0.0 - 0.6 K/uL    Basophils Absolute 0.1 0.0 - 0.2 K/uL   Basic Metabolic Panel w/ Reflex to MG   Result Value Ref Range    Sodium 134 (L) 136 - 145 mmol/L    Potassium reflex Magnesium 5.9 (H) 3.5 - 5.1 mmol/L    Chloride 98 (L) 99 - 110 mmol/L    CO2 23 21 - 32 mmol/L    Anion Gap 13 3 - 16    Glucose 175 (H) 70 - 99 mg/dL    BUN 25 (H) 7 - 20 mg/dL    CREATININE 2.2 (H) 0.8 - 1.3 mg/dL    GFR Non-African American 30 (A) >60    GFR  37 (A) >60    Calcium 9.9 8.3 - 10.6 mg/dL   Brain Natriuretic Peptide   Result Value Ref Range    Pro- (H) 0 - 124 pg/mL   Troponin   Result Value Ref Range    Troponin <0.01 <0.01 ng/mL     RECENT VITALS:  BP: (!) 150/81, Temp: 98.5 °F (36.9 °C), Pulse: 76, Resp: 16, SpO2: 98 %     ED Course     Nursing Notes, Past Medical Hx,Past Surgical Hx, Social Hx, Allergies, and Family Hx were reviewed. The patient was given the following medications:  Orders Placed This Encounter   Medications    lactated ringers infusion 1,000 mL       CONSULTS:  [address] [facility] / Shelli Sivan is a 56 y.o. male who presents the emergency room with chest pain. Vital signs stable on presentation remained stable throughout his stay. Thorough history and physical exam performed in detail above. Patient presents the emergency department with chest pain as detailed in HPI. He states he has had intermittent stabbing sensations to the center of his chest since, however they are very mild in nature. On physical exam heart rate and rhythm are regular. Lungs clear to auscultation bilaterally. Abdomen soft and nontender. EKG without ST or T wave changes concerning for ischemia. CBC with a mildly elevated white blood cell count of 13.8, however patient has no infectious symptoms.   BMP with an elevated creatinine of 2.2, most recently 1.1 in January of this year. Potassium also hemolyzed at 5.9, therefore repeat potassium was ordered. proBNP 154. Troponin negative. Chest x-ray without acute cardiopulmonary abnormality. Patient was given 1 L of fluids for his CHINO given I do believe he is dehydrated. At this time, I do believe patient warrants admission to the hospital given his concerning story for chest pain along with CHINO. This was discussed with his primary care provider Dr. Lawrence Calzada. Call was placed to the AOD to discuss admission. This patient was also evaluated by the attending physician. All care plans were discussed and agreed upon. Clinical Impression     1. Chest pain, unspecified type    2. CHINO (acute kidney injury) ([facility]        Disposition     PATIENT REFERRED TO:  No follow-up provider specified.     DISCHARGE MEDICATIONS:  New Prescriptions    No medications on file       DISPOSITION Decision To Admit 06/27/2021 08:34:06 PM        Jesse Blanchard PA-C  06/27/21 0341

## 2021-06-28 LAB
ANION GAP SERPL CALCULATED.3IONS-SCNC: 11 MMOL/L (ref 3–16)
BUN BLDV-MCNC: 28 MG/DL (ref 7–20)
CALCIUM SERPL-MCNC: 9.3 MG/DL (ref 8.3–10.6)
CHLORIDE BLD-SCNC: 101 MMOL/L (ref 99–110)
CHOLESTEROL, TOTAL: 158 MG/DL (ref 0–199)
CO2: 24 MMOL/L (ref 21–32)
CREAT SERPL-MCNC: 1.6 MG/DL (ref 0.8–1.3)
CREATININE URINE: 227.8 MG/DL (ref 39–259)
EKG ATRIAL RATE: 66 BPM
EKG DIAGNOSIS: NORMAL
EKG P AXIS: 40 DEGREES
EKG P-R INTERVAL: 164 MS
EKG Q-T INTERVAL: 396 MS
EKG QRS DURATION: 96 MS
EKG QTC CALCULATION (BAZETT): 415 MS
EKG R AXIS: 12 DEGREES
EKG T AXIS: -68 DEGREES
EKG VENTRICULAR RATE: 66 BPM
GFR AFRICAN AMERICAN: 53
GFR NON-AFRICAN AMERICAN: 44
GLUCOSE BLD-MCNC: 160 MG/DL (ref 70–99)
HCT VFR BLD CALC: 40.2 % (ref 40.5–52.5)
HDLC SERPL-MCNC: 34 MG/DL (ref 40–60)
HEMOGLOBIN: 14.1 G/DL (ref 13.5–17.5)
LDL CHOLESTEROL CALCULATED: 81 MG/DL
LV EF: 38 %
LVEF MODALITY: NORMAL
MCH RBC QN AUTO: 31.5 PG (ref 26–34)
MCHC RBC AUTO-ENTMCNC: 35 G/DL (ref 31–36)
MCV RBC AUTO: 90.2 FL (ref 80–100)
PDW BLD-RTO: 13 % (ref 12.4–15.4)
PLATELET # BLD: 146 K/UL (ref 135–450)
PMV BLD AUTO: 8.6 FL (ref 5–10.5)
POTASSIUM REFLEX MAGNESIUM: 4.1 MMOL/L (ref 3.5–5.1)
RBC # BLD: 4.46 M/UL (ref 4.2–5.9)
SODIUM BLD-SCNC: 136 MMOL/L (ref 136–145)
TRIGL SERPL-MCNC: 216 MG/DL (ref 0–150)
TROPONIN: <0.01 NG/ML
TROPONIN: <0.01 NG/ML
UREA NITROGEN, UR: 739.4 MG/DL (ref 800–1666)
VLDLC SERPL CALC-MCNC: 43 MG/DL
WBC # BLD: 11.1 K/UL (ref 4–11)

## 2021-06-28 PROCEDURE — 93005 ELECTROCARDIOGRAM TRACING: CPT | Performed by: STUDENT IN AN ORGANIZED HEALTH CARE EDUCATION/TRAINING PROGRAM

## 2021-06-28 PROCEDURE — 2580000003 HC RX 258: Performed by: STUDENT IN AN ORGANIZED HEALTH CARE EDUCATION/TRAINING PROGRAM

## 2021-06-28 PROCEDURE — 78452 HT MUSCLE IMAGE SPECT MULT: CPT

## 2021-06-28 PROCEDURE — 85027 COMPLETE CBC AUTOMATED: CPT

## 2021-06-28 PROCEDURE — 84484 ASSAY OF TROPONIN QUANT: CPT

## 2021-06-28 PROCEDURE — 99222 1ST HOSP IP/OBS MODERATE 55: CPT | Performed by: HOSPITALIST

## 2021-06-28 PROCEDURE — 6360000002 HC RX W HCPCS: Performed by: STUDENT IN AN ORGANIZED HEALTH CARE EDUCATION/TRAINING PROGRAM

## 2021-06-28 PROCEDURE — 2580000003 HC RX 258: Performed by: HOSPITALIST

## 2021-06-28 PROCEDURE — 93017 CV STRESS TEST TRACING ONLY: CPT

## 2021-06-28 PROCEDURE — 1200000000 HC SEMI PRIVATE

## 2021-06-28 PROCEDURE — 36415 COLL VENOUS BLD VENIPUNCTURE: CPT

## 2021-06-28 PROCEDURE — A9502 TC99M TETROFOSMIN: HCPCS | Performed by: STUDENT IN AN ORGANIZED HEALTH CARE EDUCATION/TRAINING PROGRAM

## 2021-06-28 PROCEDURE — 80048 BASIC METABOLIC PNL TOTAL CA: CPT

## 2021-06-28 PROCEDURE — 3430000000 HC RX DIAGNOSTIC RADIOPHARMACEUTICAL: Performed by: STUDENT IN AN ORGANIZED HEALTH CARE EDUCATION/TRAINING PROGRAM

## 2021-06-28 PROCEDURE — 6370000000 HC RX 637 (ALT 250 FOR IP): Performed by: STUDENT IN AN ORGANIZED HEALTH CARE EDUCATION/TRAINING PROGRAM

## 2021-06-28 PROCEDURE — 80061 LIPID PANEL: CPT

## 2021-06-28 RX ORDER — SODIUM CHLORIDE 9 MG/ML
INJECTION, SOLUTION INTRAVENOUS CONTINUOUS
Status: DISCONTINUED | OUTPATIENT
Start: 2021-06-28 | End: 2021-06-30 | Stop reason: HOSPADM

## 2021-06-28 RX ORDER — LOSARTAN POTASSIUM 50 MG/1
50 TABLET ORAL 2 TIMES DAILY
Status: DISCONTINUED | OUTPATIENT
Start: 2021-06-28 | End: 2021-06-29

## 2021-06-28 RX ORDER — LOSARTAN POTASSIUM 50 MG/1
50 TABLET ORAL ONCE
Status: COMPLETED | OUTPATIENT
Start: 2021-06-28 | End: 2021-06-28

## 2021-06-28 RX ADMIN — Medication 10 ML: at 08:54

## 2021-06-28 RX ADMIN — HEPARIN SODIUM 5000 UNITS: 5000 INJECTION INTRAVENOUS; SUBCUTANEOUS at 21:19

## 2021-06-28 RX ADMIN — Medication 10 ML: at 10:23

## 2021-06-28 RX ADMIN — TETROFOSMIN 30 MILLICURIE: 1.38 INJECTION, POWDER, LYOPHILIZED, FOR SOLUTION INTRAVENOUS at 10:22

## 2021-06-28 RX ADMIN — SODIUM CHLORIDE: 9 INJECTION, SOLUTION INTRAVENOUS at 08:18

## 2021-06-28 RX ADMIN — HEPARIN SODIUM 5000 UNITS: 5000 INJECTION INTRAVENOUS; SUBCUTANEOUS at 16:03

## 2021-06-28 RX ADMIN — ALLOPURINOL 100 MG: 100 TABLET ORAL at 08:18

## 2021-06-28 RX ADMIN — SODIUM CHLORIDE: 9 INJECTION, SOLUTION INTRAVENOUS at 18:30

## 2021-06-28 RX ADMIN — LOSARTAN POTASSIUM 50 MG: 50 TABLET, FILM COATED ORAL at 08:18

## 2021-06-28 RX ADMIN — TETROFOSMIN 10 MILLICURIE: 1.38 INJECTION, POWDER, LYOPHILIZED, FOR SOLUTION INTRAVENOUS at 08:54

## 2021-06-28 RX ADMIN — HEPARIN SODIUM 5000 UNITS: 5000 INJECTION INTRAVENOUS; SUBCUTANEOUS at 06:06

## 2021-06-28 RX ADMIN — LOSARTAN POTASSIUM 50 MG: 50 TABLET, FILM COATED ORAL at 00:10

## 2021-06-28 RX ADMIN — ASPIRIN 81 MG: 81 TABLET, CHEWABLE ORAL at 08:18

## 2021-06-28 RX ADMIN — LOSARTAN POTASSIUM 50 MG: 50 TABLET, FILM COATED ORAL at 21:18

## 2021-06-28 ASSESSMENT — PAIN SCALES - GENERAL
PAINLEVEL_OUTOF10: 0

## 2021-06-28 NOTE — PROGRESS NOTES
4 Eyes Admission Assessment     I agree as the admission nurse that 2 RN's have performed a thorough Head to Toe Skin Assessment on the patient. ALL assessment sites listed below have been assessed on admission. Areas assessed by both nurses:   [x]   Head, Face, and Ears   [x]   Shoulders, Back, and Chest  [x]   Arms, Elbows, and Hands   [x]   Coccyx, Sacrum, and Ischium  [x]   Legs, Feet, and Heels        Does the Patient have Skin Breakdown?   NO   Psoriasis R shin, LLE, Bilateral hands        Paresh Prevention initiated:  NO   Wound Care Orders initiated:  NO      WOC nurse consulted for Pressure Injury (Stage 3,4, Unstageable, DTI, NWPT, and Complex wounds) or Paresh score 18 or lower:  NO      Nurse 1 eSignature: Electronically signed by Nereyda Escobar RN on 6/27/2021 at 10:04 PM      **SHARE this note so that the co-signing nurse is able to place an eSignature**    Nurse 2 eSignature: Electronically signed by Tom Samuel RN on 6/28/21 at 1:12 AM EDT

## 2021-06-28 NOTE — PROGRESS NOTES
Progress Note    Admit Date: 6/27/2021  Diet: ADULT DIET; Regular; Low Sodium (2 gm); No Caffeine    CC: Chest discomfort Nausea    Interval history: NAEON. Pt was seen at bedside today AM, resting comfortably. Does not have any new complaints. Pt has not had any chest discomfort or nausea since admission. Ambulating in the room, feels steady on his feet. Endorses appetite. Having normal BM and is voiding as usual.  Denies F/C, N/V, CP, SOB, HA, vision changes, weakness in arms and feet. Medications:     Scheduled Meds:   losartan  50 mg Oral BID    allopurinol  100 mg Oral Daily    sodium chloride flush  5-40 mL Intravenous 2 times per day    aspirin  81 mg Oral Daily    atorvastatin  40 mg Oral Nightly    heparin (porcine)  5,000 Units Subcutaneous 3 times per day     Continuous Infusions:   sodium chloride 100 mL/hr at 06/28/21 0818    sodium chloride       PRN Meds:sodium chloride flush, sodium chloride, ondansetron **OR** ondansetron, acetaminophen **OR** acetaminophen, polyethylene glycol, nitroGLYCERIN    Objective:   Vitals:   T-max:  Patient Vitals for the past 8 hrs:   BP Temp Temp src Pulse Resp SpO2   06/28/21 1240 -- -- -- 64 -- --   06/28/21 1239 (!) 145/83 97.4 °F (36.3 °C) Oral 64 17 97 %   06/28/21 0812 127/73 97.2 °F (36.2 °C) Oral 57 18 94 %       Intake/Output Summary (Last 24 hours) at 6/28/2021 1356  Last data filed at 6/28/2021 9742  Gross per 24 hour   Intake 360 ml   Output 500 ml   Net -140 ml     Physical Exam  Constitutional:       General: He is not in acute distress. Appearance: Normal appearance. He is obese. He is not ill-appearing, toxic-appearing or diaphoretic. HENT:      Mouth/Throat:      Mouth: Mucous membranes are moist.   Eyes:      Pupils: Pupils are equal, round, and reactive to light. Cardiovascular:      Rate and Rhythm: Normal rate and regular rhythm. Pulses: Normal pulses. Heart sounds: Normal heart sounds. No murmur heard.      Pulmonary: Effort: Pulmonary effort is normal. No respiratory distress. Breath sounds: Normal breath sounds. Abdominal:      General: Bowel sounds are normal. There is no distension. Palpations: Abdomen is soft. Tenderness: There is no abdominal tenderness. Musculoskeletal:      Right lower leg: Edema present. Left lower leg: Edema present. Skin:     Comments: psoaraitic plaques present throughtout   Neurological:      Mental Status: He is alert and oriented to person, place, and time. Mental status is at baseline. LABS:  CBC:   Recent Labs     06/27/21 1957 06/28/21 0224   WBC 13.8* 11.1*   HGB 15.4 14.1   HCT 44.0 40.2*    146   MCV 89.4 90.2     Renal:    Recent Labs     06/27/21 1955 06/27/21 1957 06/28/21 0223   NA  --  134* 136   K 4.4 5.9* 4.1   CL  --  98* 101   CO2  --  23 24   BUN  --  25* 28*   CREATININE  --  2.2* 1.6*   GLUCOSE  --  175* 160*   CALCIUM  --  9.9 9.3   ANIONGAP  --  13 11     Hepatic: No results for input(s): AST, ALT, BILITOT, BILIDIR, PROT, LABALBU, ALKPHOS in the last 72 hours. Troponin:   Recent Labs     06/27/21 1957 06/27/21 2333 06/28/21 0224   TROPONINI <0.01 <0.01 <0.01     BNP: No results for input(s): BNP in the last 72 hours. Lipids: No results for input(s): CHOL, HDL in the last 72 hours. Invalid input(s): LDLCALCU, TRIGLYCERIDE  ABGs:  No results for input(s): PHART, UBU1ZHC, PO2ART, QQU4JRX, BEART, THGBART, Z1XXSURF, BFN9PRE in the last 72 hours. INR: No results for input(s): INR in the last 72 hours. Lactate: No results for input(s): LACTATE in the last 72 hours. Cultures:  -----------------------------------------------------------------  RAD:   XR CHEST (2 VW)   Final Result      No evidence of acute cardiopulmonary disease. NM Cardiac Stress Test Nuclear Imaging    (Results Pending)       Assessment/Plan:     Chest discomfort  -Feel his symptom is more of nausea.   Did not have any shortness of breath or other alarming signs. Resolved in less than a minute and he was able to go eat dinner and groceries after that. -Trop negative X 3. EKG has new flattening T wave in the lateral leads compared to 1/2019. His repolarization abnormality in V1 V2 is essentially unchanged.   -Heart score is 3. Low risk mace. - Pending Exercise stress test  -Is now on aspirin at home. We will give aspirin 325 mg once and then started on daily.  -Continue home Lipitor.       CHINO, resolving  -Likely prerenal.  He is on Lasix at home, and has been training a lot of time outdoors during strenuous activity.   -We will send urine studies although it may not be reflective now that he status post the LR bolus.   -Hold home Lasix and losartan for now  -IVF at 100      Chronic systolic fetal heart failure (not in acute exacerbation)  -Looks hypovolemic on exam.  As was 1 L LR bolus in the ED.   -Hold home Lasix and losartan for now. Hold beta-blocker for stress test.      Will discuss with attending physician Dr. Trish Abdalla     Code Status:Full code  FEN: NPO after midnight. No caffiene  PPX: Lovenox  DISPO: IP. D/c tomorrow after resolution of CHINO    Gordo Garcia MD, PGY-1  06/28/21  1:56 PM    This patient has been staffed and discussed with Thelda Cabot, MD.     Addendum to Resident H& P/Progress note:  I have personally seen,examined and evaluated the patient. I have reviewed the current history, physical findings, labs and assessment and plan and agree with note as documented by resident MD ( [place] Angeles)  The results of cardiac stress test with myoview are pending; will follow up  Continue with IV fluids for treatment of acute kidney injury.     Thelda Cabot, MD, FACP

## 2021-06-28 NOTE — PLAN OF CARE
Problem: Cardiac Output - Decreased:  Goal: Hemodynamic stability will improve  Description: Hemodynamic stability will improve  6/28/2021 0944 by Se Jaffe RN  Note: Stress test scheduled this AM. Cardiology consulted. NRS on tele monitor with no changes in rhythm so far this shift.  Will cont to monitor

## 2021-06-28 NOTE — ED PROVIDER NOTES
ED Attending Attestation Note     Date of evaluation: 6/27/2021    This patient was seen by the advance practice provider. I have seen and examined the patient, agree with the workup, evaluation, management and diagnosis. The care plan has been discussed. I have reviewed the ECG and concur with the ISRAEL's interpretation. My assessment reveals 39-year-old male presenting to the emergency department the complaint of chest pain. On examination 2+ radial pulses bilaterally lungs clear to auscultation bilaterally.      Genet Pearson MD  06/27/21 2022

## 2021-06-28 NOTE — CARE COORDINATION
Case Management Note    Patient unavailable at this time for assessment      CM attempted to meet with patient for completion of initial face to face assessment at this time. Patient currently off the unit for stress test in cardiology. CM has reviewed chart and noted patient is from home with spouse, not currently noted to be active with [facility]ort[facility] Bandar or using DME per chart review. CM will follow for DC planning,but anticipate no new needs at DC. CM will follow up when patient available and will complete initial assessment and assist with needs for discharge.     Jeni Mcallister RN  [facility] [employer]  Case Management Department  Ph: [phone]  Fax: [phone]

## 2021-06-28 NOTE — DISCHARGE SUMMARY
INTERNAL MEDICINE DEPARTMENT AT [address] [place] [address]  DISCHARGE SUMMARY    Patient ID: Mariella Dolan                                             Discharge Date: 6/30/2021   Patient's PCP: Patricia Guo MD                                          Discharge Physician: Mo Messer MD MD  Admit Date: 6/27/2021   Admitting Physician: Patricia Guo MD    DISCHARGE DIAGNOSES:  Hospital Problems         Last Modified POA    Abnormal myocardial perfusion study 6/29/2021 Yes    Chronic clinical systolic heart failure ([address] 6/28/2021 Yes    Chest pain 6/27/2021 Yes    CHINO (acute kidney injury) ([facility] 6/28/2021 Yes    Dilated cardiomyopathy ([facility] 6/29/2021 Yes        Hospital Course:      Mariella Dolan is a 56 y.o. male h/o nonischemic cardiomyopathy, heart failure with reduced ejection fraction, hypertension, psoriasis who presents with chest discomfort. Admitted for ACS rule out and CHINO,     EKG with twave flattening which is new, trops negative X 3. Stress test showed reversible defect suggestive of inferior ischemia, severe LV dilation with EF of 38%, high risk scan. LHC showed single vessel CAD, complete occlusion of RCA with extensive collaterals. Diagonal branch occluded 75%. Discharged on Coreg 6.25 BID, Losartan 50 daily, spironolactone 12.5 daily. Held lasix, follow up with PCP and Cardiology for further medication management. Pt had an CHINO on presentation, was given IVF. On the day of d/c, Cr was 1.2. Lasix and losartan were held during inpatient stay. Pt is to follow up with PCP in 2 weeks and Cardiology in 4 weeks for hospital follow up. Pt was seen at bedside today AM, resting comfortably. Does not have any new complaints. Ambulating in the room, feels steady on his feet. Endorses appetite. Having normal BM and is voiding as usual. Feels safe to go home. Denies F/C, N/V, CP, SOB, HA, vision changes, weakness in arms and feet.      Physical Exam:  BP (!) 149/91   Pulse 56   Temp 97.2 °F (36.2 °C) (Oral)   Resp 18   Ht 5' 8\" (1.727 m)   Wt 239 lb (108.4 kg)   SpO2 98%   BMI 36.34 kg/m²   Physical Exam  Constitutional:       General: He is not in acute distress. Appearance: Normal appearance. He is obese. He is not ill-appearing, toxic-appearing or diaphoretic. Eyes:      Pupils: Pupils are equal, round, and reactive to light. Cardiovascular:      Rate and Rhythm: Normal rate and regular rhythm. Pulses: Normal pulses. Heart sounds: Normal heart sounds. No murmur heard. Pulmonary:      Effort: Pulmonary effort is normal. No respiratory distress. Breath sounds: Normal breath sounds. Abdominal:      General: Bowel sounds are normal. There is no distension. Tenderness: There is no abdominal tenderness. There is no guarding. Musculoskeletal:         General: No swelling. Right lower leg: No edema. Left lower leg: No edema. Skin:     General: Skin is warm. Neurological:      Mental Status: He is alert and oriented to person, place, and time. Mental status is at baseline. Psychiatric:         Mood and Affect: Mood normal.         Behavior: Behavior normal.         Thought Content: Thought content normal.         Judgment: Judgment normal.       Consults:  IP CONSULT TO PRIMARY CARE PROVIDER  IP CONSULT TO CARDIOLOGY    Disposition: home  Discharged Condition: Stable  Follow Up: Primary Care Physician in two weeks    Cardiology in 4 weeks    DISCHARGE MEDICATION:     Medication List      START taking these medications    aspirin 81 MG chewable tablet  Take 1 tablet by mouth daily  Start taking on: July 1, 2021     atorvastatin 40 MG tablet  Commonly known as: LIPITOR  Take 1 tablet by mouth nightly     nitroGLYCERIN 0.4 MG SL tablet  Commonly known as: NITROSTAT  up to max of 3 total doses. If no relief after 1 dose, call 911.         CHANGE how you take these medications    carvedilol 6.25 MG tablet  Commonly known as: COREG  Take 1 tablet by mouth 2 times daily (with meals)  What changed:   · medication strength  · See the new instructions. losartan 50 MG tablet  Commonly known as: COZAAR  Take 1 tablet by mouth daily  Start taking on: July 1, 2021  What changed: when to take this     spironolactone 25 MG tablet  Commonly known as: ALDACTONE  Take 0.5 tablets by mouth daily  Start taking on: July 1, 2021  What changed: See the new instructions. CONTINUE taking these medications    allopurinol 100 MG tablet  Commonly known as: ZYLOPRIM  TAKE 1 TABLET BY MOUTH EVERY DAY     clobetasol 0.05 % ointment  Commonly known as: TEMOVATE  APPLY EXTERNALLY TO THE AFFECTED AREA TWICE DAILY     CoQ10 100 MG Caps     Fish Oil 1200 MG Caps     GLUCOSAMINE 1500 COMPLEX PO     magnesium 30 MG tablet     magnesium citrate solution     vitamin D 400 units Tabs tablet  Commonly known as: CHOLECALCIFEROL     vitamin E 400 UNIT capsule     Vitamin-B Complex Tabs     ZINC 15 PO        STOP taking these medications    furosemide 20 MG tablet  Commonly known as: Lasix           Where to Get Your Medications      These medications were sent to [facility]/pharmacy [address] [place] [address] [phone] [facility] [phone]  [address] [facility] [address]    Hours: 24-hours Phone: [phone]   · aspirin 81 MG chewable tablet  · atorvastatin 40 MG tablet  · carvedilol 6.25 MG tablet  · losartan 50 MG tablet  · nitroGLYCERIN 0.4 MG SL tablet  · spironolactone 25 MG tablet       Activity: activity as tolerated  Diet: cardiac diet  Wound Care: as directed    Time Spent on discharge is more than 45 minutes    Signed:  eMeta Lamar MD,  MD   6/30/2021    Addendum to Resident H& P/Progress note:  I have personally seen,examined and evaluated the patient.  I have reviewed the current history, physical findings, labs and assessment and plan and agree with note as documented by resident MD ( Belem Sanz)      Rosario Brito MD, Sy Kang

## 2021-06-28 NOTE — H&P
10/27/2017    LEFT SHOULDER RESURFACTING HEMIARTHROPLASTY, OPEN BICEPS    TONSILLECTOMY     ·     No current facility-administered medications on file prior to encounter. Current Outpatient Medications on File Prior to Encounter   Medication Sig Dispense Refill    allopurinol (ZYLOPRIM) 100 MG tablet TAKE 1 TABLET BY MOUTH EVERY DAY 90 tablet 1    losartan (COZAAR) 50 MG tablet Take 1 tablet by mouth 2 times daily 180 tablet 3    spironolactone (ALDACTONE) 25 MG tablet TAKE 1 TABLET BY MOUTH EVERY DAY 90 tablet 3    carvedilol (COREG) 25 MG tablet TAKE 1 TABLET BY MOUTH TWICE A DAY WITH MEALS 180 tablet 3    furosemide (LASIX) 20 MG tablet Take 1 tablet by mouth daily 90 tablet 3    magnesium 30 MG tablet Take 400 mg by mouth daily       Zinc Sulfate (ZINC 15 PO) Take by mouth      Coenzyme Q10 (COQ10) 100 MG CAPS Take 4 tablets by mouth       Omega-3 Fatty Acids (FISH OIL) 1200 MG CAPS Take 2 capsules by mouth 3 tablets once a week       B Complex Vitamins (VITAMIN-B COMPLEX) TABS Take by mouth      vitamin D (CHOLECALCIFEROL) 400 UNIT TABS tablet Take 400 Units by mouth 2 times daily.  clobetasol (TEMOVATE) 0.05 % ointment APPLY EXTERNALLY TO THE AFFECTED AREA TWICE DAILY 60 g 2    vitamin E 400 UNIT capsule Take 400 Units by mouth daily      Glucosamine-Chondroit-Vit C-Mn (GLUCOSAMINE 1500 COMPLEX PO) Take by mouth      magnesium citrate solution Take 296 mLs by mouth once       Allergies:  Doxycycline, Hydrocodone, Iodides, Ketorolac tromethamine, Naproxen, Norco [hydrocodone-acetaminophen], Oxycodone-acetaminophen, Percocet [oxycodone-acetaminophen], and Clindamycin/lincomycin    Social History:   · TOBACCO:   reports that he has never smoked. He has never used smokeless tobacco.  · ETOH:   reports current alcohol use. · DRUGS : No use  ·   Patient currently lives with his wife. Independent with ADLs. Works part-time in restaurant maintenance.    Family History:       Problem Relation Age of Onset    Other Mother         DJD    Other Father         idiopathic cardiomyopathy   ·     Review of Systems    ROS: A 10 point review of systems was conducted, significant findings as noted in HPI. Physical Exam  Constitutional:       General: He is not in acute distress. Appearance: He is not ill-appearing. HENT:      Mouth/Throat:      Mouth: Mucous membranes are moist.   Eyes:      General: No scleral icterus. Extraocular Movements: Extraocular movements intact. Cardiovascular:      Rate and Rhythm: Normal rate and regular rhythm. Heart sounds: No murmur heard. Pulmonary:      Effort: Pulmonary effort is normal.      Breath sounds: No wheezing or rales. Abdominal:      Palpations: Abdomen is soft. Tenderness: There is no abdominal tenderness. Musculoskeletal:         General: Normal range of motion. Right lower leg: No edema. Left lower leg: No edema. Skin:     Comments: Plaque-like lesions on extensor surfaces of his lower and upper extremities bilaterally. Neurological:      General: No focal deficit present. Mental Status: He is alert and oriented to person, place, and time. Psychiatric:         Mood and Affect: Mood normal.       Physical exam:       Vitals:    06/27/21 2045   BP: (!) 148/74   Pulse: 72   Resp: 16   Temp:    SpO2: 100%       DATA:    Labs:  CBC:   Recent Labs     06/27/21 1957   WBC 13.8*   HGB 15.4   HCT 44.0          BMP:   Recent Labs     06/27/21 1955 06/27/21 1957   NA  --  134*   K 4.4 5.9*   CL  --  98*   CO2  --  23   BUN  --  25*   CREATININE  --  2.2*   GLUCOSE  --  175*     Troponin:   Recent Labs     06/27/21 1957   TROPONINI <0.01       XR CHEST (2 VW)   Final Result      No evidence of acute cardiopulmonary disease. ASSESSMENT AND PLAN:    Chest discomfort  -Feel his symptom is more of nausea. Did not have any shortness of breath or other alarming signs.   Resolved in less than a minute and he was able to go eat dinner and groceries after that. -First troponin less than 0.01. EKG has new flattening T wave in the lateral leads compared to 1/2019. His repolarization abnormality in V1 V2 is essentially unchanged.   -Heart score is 3. Low risk mace.   -We will get 2 more troponins, EKG in the a.m.  -We will keep n.p.o. after midnight. Exercise stress test in the morning. We will hold off on cardiology consult at this time  -Is now on aspirin at home. We will give aspirin 325 mg once and then started on daily.  -Continue home Lipitor. CHINO  -Likely prerenal.  He is on Lasix at home, and has been training a lot of time outdoors during strenuous activity.   -Already got 1 L LR in the ED. we will hold off on further fluids.   -We will send urine studies although it may not be reflective now that he status post the LR bolus.   -Hold home Lasix and losartan for now. Chronic systolic fetal heart failure (not in acute exacerbation)  -Looks hypovolemic on exam.  As was 1 L LR bolus in the ED.   -Hold home Lasix and losartan for now. Hold beta-blocker for stress test.      Will discuss with attending physician Dr. John Tracy Status:Full code  FEN: NPO after midnight. No caffiene  PPX: Lovenox  DISPO: Brigid Gutierrez MD  6/27/2021,  9:50 PM     Addendum to Resident H& P/Progress note:  I have personally seen,examined and evaluated the patient.  I have reviewed the current history, physical findings, labs and assessment and plan and agree with note as documented by resident MD ( John Regalado)      Alessio Ayala MD, [address]

## 2021-06-29 ENCOUNTER — APPOINTMENT (OUTPATIENT)
Dept: CARDIAC CATH/INVASIVE PROCEDURES | Age: 61
DRG: 287 | End: 2021-06-29
Payer: COMMERCIAL

## 2021-06-29 PROBLEM — I42.0 DILATED CARDIOMYOPATHY (HCC): Status: ACTIVE | Noted: 2021-06-29

## 2021-06-29 LAB
ANION GAP SERPL CALCULATED.3IONS-SCNC: 10 MMOL/L (ref 3–16)
BUN BLDV-MCNC: 20 MG/DL (ref 7–20)
CALCIUM SERPL-MCNC: 8.4 MG/DL (ref 8.3–10.6)
CHLORIDE BLD-SCNC: 107 MMOL/L (ref 99–110)
CO2: 21 MMOL/L (ref 21–32)
CREAT SERPL-MCNC: 1.2 MG/DL (ref 0.8–1.3)
EKG ATRIAL RATE: 54 BPM
EKG DIAGNOSIS: NORMAL
EKG P AXIS: 58 DEGREES
EKG P-R INTERVAL: 178 MS
EKG Q-T INTERVAL: 426 MS
EKG QRS DURATION: 94 MS
EKG QTC CALCULATION (BAZETT): 403 MS
EKG R AXIS: 77 DEGREES
EKG T AXIS: -37 DEGREES
EKG VENTRICULAR RATE: 54 BPM
GFR AFRICAN AMERICAN: >60
GFR NON-AFRICAN AMERICAN: >60
GLUCOSE BLD-MCNC: 174 MG/DL (ref 70–99)
POTASSIUM REFLEX MAGNESIUM: 4.1 MMOL/L (ref 3.5–5.1)
SODIUM BLD-SCNC: 138 MMOL/L (ref 136–145)

## 2021-06-29 PROCEDURE — 6370000000 HC RX 637 (ALT 250 FOR IP): Performed by: STUDENT IN AN ORGANIZED HEALTH CARE EDUCATION/TRAINING PROGRAM

## 2021-06-29 PROCEDURE — 4A023N7 MEASUREMENT OF CARDIAC SAMPLING AND PRESSURE, LEFT HEART, PERCUTANEOUS APPROACH: ICD-10-PCS | Performed by: INTERNAL MEDICINE

## 2021-06-29 PROCEDURE — B2111ZZ FLUOROSCOPY OF MULTIPLE CORONARY ARTERIES USING LOW OSMOLAR CONTRAST: ICD-10-PCS | Performed by: INTERNAL MEDICINE

## 2021-06-29 PROCEDURE — 36415 COLL VENOUS BLD VENIPUNCTURE: CPT

## 2021-06-29 PROCEDURE — 6360000002 HC RX W HCPCS: Performed by: STUDENT IN AN ORGANIZED HEALTH CARE EDUCATION/TRAINING PROGRAM

## 2021-06-29 PROCEDURE — 1200000000 HC SEMI PRIVATE

## 2021-06-29 PROCEDURE — C1760 CLOSURE DEV, VASC: HCPCS

## 2021-06-29 PROCEDURE — 99232 SBSQ HOSP IP/OBS MODERATE 35: CPT | Performed by: HOSPITALIST

## 2021-06-29 PROCEDURE — B2151ZZ FLUOROSCOPY OF LEFT HEART USING LOW OSMOLAR CONTRAST: ICD-10-PCS | Performed by: INTERNAL MEDICINE

## 2021-06-29 PROCEDURE — 6370000000 HC RX 637 (ALT 250 FOR IP): Performed by: INTERNAL MEDICINE

## 2021-06-29 PROCEDURE — C1894 INTRO/SHEATH, NON-LASER: HCPCS

## 2021-06-29 PROCEDURE — 93454 CORONARY ARTERY ANGIO S&I: CPT

## 2021-06-29 PROCEDURE — 99255 IP/OBS CONSLTJ NEW/EST HI 80: CPT | Performed by: INTERNAL MEDICINE

## 2021-06-29 PROCEDURE — 6360000002 HC RX W HCPCS

## 2021-06-29 PROCEDURE — 93454 CORONARY ARTERY ANGIO S&I: CPT | Performed by: INTERNAL MEDICINE

## 2021-06-29 PROCEDURE — 80048 BASIC METABOLIC PNL TOTAL CA: CPT

## 2021-06-29 PROCEDURE — 2580000003 HC RX 258: Performed by: STUDENT IN AN ORGANIZED HEALTH CARE EDUCATION/TRAINING PROGRAM

## 2021-06-29 PROCEDURE — 2580000003 HC RX 258: Performed by: HOSPITALIST

## 2021-06-29 PROCEDURE — 99152 MOD SED SAME PHYS/QHP 5/>YRS: CPT

## 2021-06-29 PROCEDURE — 2580000003 HC RX 258: Performed by: INTERNAL MEDICINE

## 2021-06-29 PROCEDURE — 93010 ELECTROCARDIOGRAM REPORT: CPT | Performed by: INTERNAL MEDICINE

## 2021-06-29 PROCEDURE — 2709999900 HC NON-CHARGEABLE SUPPLY

## 2021-06-29 RX ORDER — SODIUM CHLORIDE 9 MG/ML
INJECTION, SOLUTION INTRAVENOUS CONTINUOUS
Status: ACTIVE | OUTPATIENT
Start: 2021-06-29 | End: 2021-06-29

## 2021-06-29 RX ORDER — CARVEDILOL 6.25 MG/1
6.25 TABLET ORAL 2 TIMES DAILY WITH MEALS
Status: DISCONTINUED | OUTPATIENT
Start: 2021-06-29 | End: 2021-06-30 | Stop reason: HOSPADM

## 2021-06-29 RX ORDER — LOSARTAN POTASSIUM 50 MG/1
50 TABLET ORAL DAILY
Status: DISCONTINUED | OUTPATIENT
Start: 2021-06-30 | End: 2021-06-30 | Stop reason: HOSPADM

## 2021-06-29 RX ORDER — SPIRONOLACTONE 25 MG/1
12.5 TABLET ORAL DAILY
Status: DISCONTINUED | OUTPATIENT
Start: 2021-06-30 | End: 2021-06-30 | Stop reason: HOSPADM

## 2021-06-29 RX ADMIN — LOSARTAN POTASSIUM 50 MG: 50 TABLET, FILM COATED ORAL at 08:59

## 2021-06-29 RX ADMIN — HEPARIN SODIUM 5000 UNITS: 5000 INJECTION INTRAVENOUS; SUBCUTANEOUS at 21:13

## 2021-06-29 RX ADMIN — SODIUM CHLORIDE: 9 INJECTION, SOLUTION INTRAVENOUS at 15:15

## 2021-06-29 RX ADMIN — ALLOPURINOL 100 MG: 100 TABLET ORAL at 08:59

## 2021-06-29 RX ADMIN — SODIUM CHLORIDE: 9 INJECTION, SOLUTION INTRAVENOUS at 04:43

## 2021-06-29 RX ADMIN — HEPARIN SODIUM 5000 UNITS: 5000 INJECTION INTRAVENOUS; SUBCUTANEOUS at 06:06

## 2021-06-29 RX ADMIN — ASPIRIN 81 MG: 81 TABLET, CHEWABLE ORAL at 08:59

## 2021-06-29 RX ADMIN — SODIUM CHLORIDE: 9 INJECTION, SOLUTION INTRAVENOUS at 21:46

## 2021-06-29 RX ADMIN — Medication 10 ML: at 21:11

## 2021-06-29 RX ADMIN — CARVEDILOL 6.25 MG: 6.25 TABLET, FILM COATED ORAL at 21:16

## 2021-06-29 ASSESSMENT — PAIN SCALES - GENERAL
PAINLEVEL_OUTOF10: 0

## 2021-06-29 NOTE — PLAN OF CARE
Problem: Cardiac Output - Decreased:  Goal: Hemodynamic stability will improve  Description: Hemodynamic stability will improve  Outcome: Ongoing     Pt remains hemodynamically stable  with no signs of decrease cardiac output. Will continue to monitor. Problem: Skin Integrity:  Goal: Absence of new skin breakdown  Description: Absence of new skin breakdown  Outcome: Ongoing   Pt with no new skin breakdown during this shift. Vital signs are stable, pt able to transfer appropriately. Pt able to turn self. Continue with current care. Problem: Pain:  Description: Pain management should include both nonpharmacologic and pharmacologic interventions. Goal: Pain level will decrease  Description: Pain level will decrease  Outcome: Ongoing   Pt denies pain throughout shift. Vital signs stable. Pt resting comfortably in bed continue to monitor.

## 2021-06-29 NOTE — CONSULTS
Cardiology Consultation                                                                    Pt Name: Radha Martinez  Age: 56 y.o. Sex: male  : 1960  Location: [facility]/4320-01    Referring Physician: Rivera Alvarez MD  Primary cardiologist: Dr Efe Zepeda      Reason for Consult:     Reason for Consultation/Chief Complaint: atypical CP    HPI:     Mr Misael Bowers is a 62 yo man with h/o HFrEF due to [facility] (EF 20% in ), untreated VERN, HTN, overweight, HLP, psoriasis.      Echo 2016: LVEDD 6.1, EF 20%, normal thickness.      LHC 2016: no CAD, EF 25-30%.    Echo 10/2016: LVEDD 5.7, EF 25-30%.      MUGA 2017: EF 47%.     Echo 2019: LVEDD 5.9, EF 30%, normal thickness, normal RV and mild valve disease.      Ca score 0 (zero) 2019.     He is very interested in finding out if there are any other potential causes for his cardiomyopathy which could be treatable, because he \"does not want only to treat symptoms\" with meds. Hence, the last time we ordered a CMR and blood tests to r/o causes of [facility].      NICMO labs: serum free light chain assay, ferritin, MELY, ACE and TSH within normal limits, CRP mildly elevated. BMP normal.      CMR at [facility] 19: LV dilated, LVEF 40%, mild LVH, no LGE (hence no scar), normal ECV (hence no inflammation), normal RV size and function, normal valves and pericardium.     Patient presented to the emergency room on  with atypical chest discomfort. Patient went hiking with his wife for a couple of hours on , besides excessive sweating, did not have any concerning symptoms. On  he worked for about 2 to 3 hours strenuously in his yard, had excessive sweating but no concerning symptoms. He came back to his living room and had icy cold water. After that he felt mild midsternal chest discomfort that lasted less than a minute associated with some nausea. He came to the emergency room for further evaluation. He denies any prior exertional symptoms.   He was tablet TAKE 1 TABLET BY MOUTH TWICE A DAY WITH MEALS 8/4/20  Yes Janina Parrish MD   furosemide (LASIX) 20 MG tablet Take 1 tablet by mouth daily 7/17/20  Yes Janina Parrish MD   magnesium 30 MG tablet Take 400 mg by mouth daily    Yes Historical Provider, MD   Zinc Sulfate (ZINC 15 PO) Take by mouth   Yes Historical Provider, MD   Coenzyme Q10 (COQ10) 100 MG CAPS Take 4 tablets by mouth    Yes Historical Provider, MD   Omega-3 Fatty Acids (FISH OIL) 1200 MG CAPS Take 2 capsules by mouth 3 tablets once a week    Yes Historical Provider, MD   B Complex Vitamins (VITAMIN-B COMPLEX) TABS Take by mouth   Yes Historical Provider, MD   vitamin D (CHOLECALCIFEROL) 400 UNIT TABS tablet Take 400 Units by mouth 2 times daily.  8/18/10  Yes Historical Provider, MD   clobetasol (TEMOVATE) 0.05 % ointment APPLY EXTERNALLY TO THE AFFECTED AREA TWICE DAILY 5/18/21   Nya Ngueyn MD   vitamin E 400 UNIT capsule Take 400 Units by mouth daily    Historical Provider, MD   Glucosamine-Chondroit-Vit C-Mn (GLUCOSAMINE 1500 COMPLEX PO) Take by mouth    Historical Provider, MD   magnesium citrate solution Take 296 mLs by mouth once    Historical Provider, MD          Inpatient Medications:   losartan  50 mg Oral BID    allopurinol  100 mg Oral Daily    sodium chloride flush  5-40 mL Intravenous 2 times per day    aspirin  81 mg Oral Daily    atorvastatin  40 mg Oral Nightly    heparin (porcine)  5,000 Units Subcutaneous 3 times per day       IV drips:   sodium chloride 100 mL/hr at 06/29/21 0443    sodium chloride         PRN:  sodium chloride flush, sodium chloride, ondansetron **OR** ondansetron, acetaminophen **OR** acetaminophen, polyethylene glycol, nitroGLYCERIN    Allergy:     Doxycycline, Hydrocodone, Iodides, Ketorolac tromethamine, Naproxen, Norco [hydrocodone-acetaminophen], Oxycodone-acetaminophen, Percocet [oxycodone-acetaminophen], and Clindamycin/lincomycin       Review of Systems:     All 12 point review of symptoms completed. Pertinent positives identified in the HPI, all other review of symptoms negative as below. CONSTITUTIONAL: No fatigue  SKIN: No rash or pruritis. EYES: No visual changes or diplopia. No scleral icterus. ENT: No Headaches, hearing loss or vertigo. No mouth sores or sore throat. CARDIOVASCULAR: No chest pain/chest pressure/chest discomfort. No palpitations. No edema. RESPIRATORY: No dyspnea. No cough or wheezing, no sputum production. GASTROINTESTINAL: No N/V/D. No abdominal pain, appetite loss, blood in stools. GENITOURINARY: No dysuria, trouble voiding, or hematuria. MUSCULOSKELETAL:  No gait disturbance, weakness or joint complaints. NEUROLOGICAL: No headache, diplopia, change in muscle strength, numbness or tingling. No change in gait, balance, coordination, mood, affect, memory, mentation, behavior. ENDOCRINE: No excessive thirst, fluid intake, or urination. No tremor. HEMATOLOGIC: No abnormal bruising or bleeding. ALLERGY: No nasal congestion or hives.       Physical Examination:     Vitals:    06/28/21 2001 06/28/21 2310 06/29/21 0448 06/29/21 0857   BP: (!) 147/89 139/64 127/74 (!) 151/74   Pulse: 61 61 64 56   Resp: 18 18 18 17   Temp: 98.9 °F (37.2 °C) 98 °F (36.7 °C) 97.8 °F (36.6 °C) 98.3 °F (36.8 °C)   TempSrc: Oral Oral Oral Oral   SpO2: 94% 97% 94% 96%   Weight:       Height:           Wt Readings from Last 3 Encounters:   06/27/21 241 lb 13.5 oz (109.7 kg)   04/23/21 238 lb (108 kg)   01/20/21 240 lb 3.2 oz (109 kg)         General Appearance:  Alert, cooperative, no distress, appears stated age Appropriate weight   Head:  Normocephalic, without obvious abnormality, atraumatic   Eyes:  PERRL, conjunctiva/corneas clear EOM intact  Ears normal   Throat no lesions       Nose: Nares normal, no drainage or sinus tenderness   Throat: Lips, mucosa, and tongue normal   Neck: Supple, symmetrical, trachea midline, no adenopathy, thyroid: not enlarged, symmetric, no tenderness/mass/nodules, no carotid bruit. Lungs:   Respirations unlabored, clear to auscultation bilaterally, without any wheezes, rubs or ronchi. Chest Wall:  No tenderness or deformity   Heart:  Regular rhythm, rate is controlled, S1, S2 normal, there is no murmur, there is no rub or gallop, no jvd, no bilateral lower extremity edema   Abdomen:   Soft, non-tender, bowel sounds active all four quadrants,  no masses, no organomegaly       Extremities: Extremities normal, atraumatic, no cyanosis. Pulses: 2+ and symmetric   Skin: Skin color, texture, turgor normal, no rashes or lesions   Pysch: Normal mood and affect   Neurologic: Normal gross motor and sensory exam.  Cranial nerves intact        Labs:     Recent Labs     06/27/21 1955 06/27/21 1957 06/28/21 0223 06/29/21 0457   NA  --  134* 136 138   K 4.4 5.9* 4.1 4.1   BUN  --  25* 28* 20   CREATININE  --  2.2* 1.6* 1.2   CL  --  98* 101 107   CO2  --  23 24 21   GLUCOSE  --  175* 160* 174*   CALCIUM  --  9.9 9.3 8.4     Recent Labs     06/27/21 1957 06/27/21 1957 06/28/21 0224   WBC 13.8*  --  11.1*   HGB 15.4  --  14.1   HCT 44.0  --  40.2*     --  146   MCV 89.4   < > 90.2    < > = values in this interval not displayed. Recent Labs     06/28/21 0223   TRIG 216*   HDL 34*     No results for input(s): PTT, INR in the last 72 hours. Invalid input(s): PT  Recent Labs     06/27/21 1957 06/27/21 2333 06/28/21 0224   TROPONINI <0.01 <0.01 <0.01     No results for input(s): BNP in the last 72 hours. No results for input(s): TSH in the last 72 hours. Recent Labs     06/28/21 0223   CHOL 158   HDL 34*   LDLCALC 81   TRIG 216*   ]    Lab Results   Component Value Date    TROPONINI <0.01 06/28/2021         Imaging:     Telemetry:  NSR      Assessment / Plan:     1. Atypical chest pain:  I suspect patient's symptoms were due to GI cause (esophageal spasm during drinking cold water).   He did not have any exertional and concerning however, inadvertent computerized transcription errors may be present. All questions and concerns were addressed to the patient/family. Alternatives to my treatment were discussed. I would like to thank you for providing me the opportunity to participate in the care of your patient. If you have any questions, please do not hesitate to contact me.      Marcia Coronado MD, [facility], [address]  The [address]  [address] [address]  Ph: [phone]  Fax: [phone]

## 2021-06-29 NOTE — PROCEDURES
[address]               [address]                            CARDIAC CATHETERIZATION    PATIENT NAME: Deb Albarado                    :        1960  MED REC NO:   [id number]                          ROOM:       36  ACCOUNT NO:   [de-identified]                           ADMIT DATE: 2021  PROVIDER:     Ludwin Mobley MD    DATE OF PROCEDURE:  2021    PROCEDURES PERFORMED:  Selective coronary cineangiography of left and  right coronaries and Angio-Seal of the right femoral arteriotomy site. HISTORY:  The patient is a 78-year-old male with a history of congestive  heart failure and nonischemic cardiomyopathy. He was diagnosed several  years ago. He has been treated for heart failure for a number of years. He was doing lawn work and other yard work and was sweating throughout  the day for several hours. He _____ where he was drinking water and was  feeling as if it was getting stuck in his esophagus. He felt nauseated  for about 30 seconds. He went to groceries and then took dinner and  felt okay after that. He was uncertain what the symptoms represented,  but presented to the emergency room for evaluation. He does state that  he gets dyspneic on exertion with strenuous activity. He has a  reasonably good exercise tolerance and can walk several miles without  getting short of breath. He notes no PND or orthopnea. As noted, he  was symptom free when he presented. He had negative troponins. He had  flattening of his T-waves. As such, he underwent Myoview stress  testing, which demonstrated a moderate sized mid to distal inferior wall  and apical mixed defect consistent with scar and ischemia. His EF was  38%. It has been documented in the past of being depressed in addition. Because of his abnormal Myoview; however, it was felt he should undergo  catheterization.     TECHNICAL PROCEDURE:  The patient was brought to the cardiac  catheterization lab on 06/29/2021 where the right femoral artery was  prepped and draped in the usual sterile fashion. After anesthetizing  the area with 2% lidocaine, a 5-Ku[place]i sheath was placed in the right  femoral artery using Seldinger technique. Subsequently, a selective  coronary cineangiography of both left and right coronaries were  performed in multiple projections. At this point, we used 5-[place] JL4  and JR4 diagnostic catheters. The patient tolerated the procedure well. No complications were encountered. A brief right femoral arteriogram  was obtained to ascertain positioning appropriate for his Angio-Seal.   It was well positioned. He was successfully sealed with standard  6-Kuwaiti Angio-Seal device. No complications were encountered. Estimated blood loss less than 5cc. RESULTS:  LEFT MAIN:  Left main is free of significant obstructive disease. LEFT ANTERIOR DESCENDING:  The LAD courses to, but does not achieve the  apex. It gives off three diagonal branches. They are moderate in  distribution. There is moderate calcification throughout the LAD and  mild diffuse disease. The first diagonal branch is a moderate  distribution vessel and has a long area of tubular stenosis approaching  75% throughout this area. Distal vessels are free of significant  obstructive disease. LEFT CIRCUMFLEX:  Circumflex consists of a high rising marginal versus  intermediate ramus branch, which is relatively a small in distribution. He has a mild diffuse disease, but no significant focal obstructive  lesions. LEFT CIRCUMFLEX:  Circumflex consists essentially of a single marginal  branch with multiple terminal divisions. It then courses around the AV  groove. There is mild diffuse, but no significant focal obstructive  lesions. RIGHT CORONARY ARTERY:  Right coronary artery is a large dominant  vessel. It is totally occluded in its mid portion.   There are extensive  collaterals filling the entire PDA, distribution along the entire  inferior wall, and wraps the inferior apex. IMPRESSION:  1. Cardiomyopathy. 2.  Essentially single-vessel coronary artery disease consisting of a  totally occluded but collateralized right coronary artery and branch  vessel disease consisting of diagonal branch disease. 3.  Successful Angio-Seal of the right femoral arteriotomy site.         Radha Angeles MD    D: 06/29/2021 14:30:38       T: 06/29/2021 15:28:27     JOHN/TIA_ELY  Job#: [id number]     Doc#: [id number]    CC:

## 2021-06-29 NOTE — CONSULTS
Brief Pre-Op Note/Sedation Assessment      Lawerance Lighter  1960  [id number]      [id number]  2:00 PM    Planned Procedure: Cardiac Catheterization Procedure    Post Procedure Plan: Return to same level of care    Consent: I have discussed with the patient and/or the patient representative the indication, alternatives, and the possible risks and/or complications of the planned procedure and the anesthesia methods. The patient and/or patient representative appear to understand and agree to proceed. Chief Complaint: Chest Pain/Pressure      Indications for Cath Procedure:  Suspected CAD  Anginal Classification within 2 weeks:  CCS III - Symptoms with everyday living activities, i.e., moderate limitation  NYHA Heart Failure Class within 2 weeks: No symptoms  Is Cath Lab Visit Valve-related?: No  Surgical Risk: N/A  Functional Type: N/A    Anti- Anginal Meds within 2 weeks:   Yes: Beta Blockers, Aspirin and Statin (Any)    Stress or Imaging Studies Performed (within 6 months):  Stress Test with SPECT Result: Positive:  inferior Risk/Extent of Ischemia:  High     Vital Signs:  BP (!) 155/78   Pulse 69   Temp 97 °F (36.1 °C) (Oral)   Resp 18   Ht 5' 8\" (1.727 m)   Wt 241 lb 13.5 oz (109.7 kg)   SpO2 95%   BMI 36.77 kg/m²     Allergies:   Allergies   Allergen Reactions    Doxycycline Dermatitis    Hydrocodone Other (See Comments)     States that skin peels off    Iodides     Ketorolac Tromethamine Swelling    Naproxen Other (See Comments)     unknown    Norco [Hydrocodone-Acetaminophen] Dermatitis    Oxycodone-Acetaminophen Hives    Percocet [Oxycodone-Acetaminophen]      Skin falls off hands    Clindamycin/Lincomycin Rash     \"sunburn rash\" and impending feeling of gloom       Past Medical History:  Past Medical History:   Diagnosis Date    Arthritis     CHF (congestive heart failure) (HCC)     CHF (congestive heart failure) (HCC)     Hypertension, benign     Kidney stone     Lipoma removal righy shoulder    Other drug allergy(995.27)     Psoriasis     pt states it is worse since being on Metoprolol    Thyroid disease     enlarged    Unspecified sleep apnea     no CPAP.  Has not done a sleep study         Surgical History:  Past Surgical History:   Procedure Laterality Date    HERNIA REPAIR      right     JOINT REPLACEMENT  10/27/2017    left shoulder    LIPOMA RESECTION      OTHER SURGICAL HISTORY Left 10/27/2017    LEFT SHOULDER RESURFACTING HEMIARTHROPLASTY, OPEN BICEPS    TONSILLECTOMY           Medications:  Current Facility-Administered Medications   Medication Dose Route Frequency Provider Last Rate Last Admin    [START ON 6/30/2021] losartan (COZAAR) tablet 50 mg  50 mg Oral Daily Janina Parrish MD        [START ON 6/30/2021] spironolactone (ALDACTONE) tablet 12.5 mg  12.5 mg Oral Daily Janina Parrish MD        carvedilol (COREG) tablet 6.25 mg  6.25 mg Oral BID WC Janina Parrish MD        0.9 % sodium chloride infusion   Intravenous Continuous Nya Nguyen  mL/hr at 06/29/21 0443 New Bag at 06/29/21 0443    allopurinol (ZYLOPRIM) tablet 100 mg  100 mg Oral Daily Nae Thomas MD   100 mg at 06/29/21 0859    sodium chloride flush 0.9 % injection 5-40 mL  5-40 mL Intravenous 2 times per day Nae Thomas MD   5 mL at 06/27/21 2300    sodium chloride flush 0.9 % injection 5-40 mL  5-40 mL Intravenous PRN Nae Thomas MD   10 mL at 06/28/21 1023    0.9 % sodium chloride infusion  25 mL Intravenous PRN Nae Thomas MD        ondansetron (ZOFRAN-ODT) disintegrating tablet 4 mg  4 mg Oral Q8H PRN Nae Thomas MD        Or    [facility]dansetron [facility] injection 4 mg  4 mg Intravenous Q6H PRN Nae Thomas MD        acetaminophen (TYLENOL) tablet 650 mg  650 mg Oral Q6H PRN Nae Thomas MD        Or    acetaminophen (TYLENOL) suppository 650 mg  650 mg Rectal Q6H PRN Nae Thomas MD        polyethylene glycol (GLYCOLAX) packet 17 g  17 g Oral Daily PRN Paddy Guo MD        aspirin chewable tablet 81 mg  81 mg Oral Daily Paddy Guo MD   81 mg at 06/29/21 0859    atorvastatin (LIPITOR) tablet 40 mg  40 mg Oral Nightly Paddy Guo MD        nitroGLYCERIN (NITROSTAT) SL tablet 0.4 mg  0.4 mg Sublingual Q5 Min PRN Paddy Guo MD        heparin (porcine) injection 5,000 Units  5,000 Units Subcutaneous 3 times per day Paddy Guo MD   5,000 Units at 06/29/21 0606           Pre-Sedation:    Pre-Sedation Documentation and Exam:  I have assessed the patient and agree with the H&P present on the chart. Prior History of Anesthesia Complications:   none    Modified Mallampati:  II (soft palate, uvula, fauces visible)    ASA Classification:  Class 3 - A patient with severe systemic disease that limits activity but is not incapacitating      Rafaela Scale: Activity:  2 - Able to move 4 extremities voluntarily on command  Respiration:  2 - Able to breathe deeply and cough freely  Circulation:  2 - BP+/- 20mmHg of normal  Consciousness:  2 - Fully awake  Oxygen Saturation (color):  2 - Able to maintain oxygen saturation >92% on room air    Sedation/Anesthesia Plan:  Guard the patient's safety and welfare. Minimize physical discomfort and pain. Minimize negative psychological responses to treatment by providing sedation and analgesia and maximize the potential amnesia. Patient to meet pre-procedure discharge plan.     Medication Planned:  midazolam intravenously and fentanyl intravenously    Patient is an appropriate candidate for plan of sedation: yes      Electronically signed by Noni Piña MD on 6/29/2021 at 2:00 PM

## 2021-06-29 NOTE — PROGRESS NOTES
sounds: Normal breath sounds. Abdominal:      General: Bowel sounds are normal. There is no distension. Palpations: Abdomen is soft. Tenderness: There is no abdominal tenderness. Musculoskeletal:      Right lower leg: Edema present. Left lower leg: Edema present. Skin:     Comments: psoaraitic plaques present throughtout   Neurological:      Mental Status: He is alert and oriented to person, place, and time. Mental status is at baseline. LABS:  CBC:   Recent Labs     06/27/21 1957 06/28/21 0224   WBC 13.8* 11.1*   HGB 15.4 14.1   HCT 44.0 40.2*    146   MCV 89.4 90.2     Renal:    Recent Labs     06/27/21 1957 06/28/21 0223 06/29/21  0457   * 136 138   K 5.9* 4.1 4.1   CL 98* 101 107   CO2 23 24 21   BUN 25* 28* 20   CREATININE 2.2* 1.6* 1.2   GLUCOSE 175* 160* 174*   CALCIUM 9.9 9.3 8.4   ANIONGAP 13 11 10     Hepatic: No results for input(s): AST, ALT, BILITOT, BILIDIR, PROT, LABALBU, ALKPHOS in the last 72 hours. Troponin:   Recent Labs     06/27/21 1957 06/27/21 2333 06/28/21 0224   TROPONINI <0.01 <0.01 <0.01     BNP: No results for input(s): BNP in the last 72 hours. Lipids:   Recent Labs     06/28/21 0223   CHOL 158   HDL 34*     ABGs:  No results for input(s): PHART, IBD9MSI, PO2ART, EKY5ZQE, BEART, THGBART, C7VLGJHC, HPN1VWS in the last 72 hours. INR: No results for input(s): INR in the last 72 hours. Lactate: No results for input(s): LACTATE in the last 72 hours. Cultures:  -----------------------------------------------------------------  RAD:   NM Cardiac Stress Test Nuclear Imaging   Final Result      XR CHEST (2 VW)   Final Result      No evidence of acute cardiopulmonary disease. Assessment/Plan:     Chest discomfort  -Feel his symptom is more of nausea. Did not have any shortness of breath or other alarming signs. Resolved in less than a minute and he was able to go eat dinner and groceries after that. -Trop negative X 3.   EKG has new flattening T wave in the lateral leads compared to 1/2019. His repolarization abnormality in V1 V2 is essentially unchanged.   -Heart score is 3. Low risk mace.   -Is now on aspirin at home. We will give aspirin 325 mg once and then started on daily.  -Continue home Lipitor.  -Exercise stress test high risk  -Consult to cardio, appreciate recs.     CHINO 2/2 prerenal, resolved  He is on Lasix at home, and has been training a lot of time outdoors during strenuous activity.  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  As was 1 L LR bolus in the ED.   -Hold home Lasix and losartan for now. Hold beta-blocker for stress test.      Will discuss with attending physician Dr. Karina Kwong     Code Status:Full code  FEN: NPO after midnight. No caffiene  PPX: Lovenox  DISPO: IP. Cardio recs. Parish Nevarez MD, PGY-1  06/29/21  7:08 AM    This patient has been staffed and discussed with Kirill Ferguson MD.     Addendum to Resident H& P/Progress note:  I have personally seen,examined and evaluated the patient. I have reviewed the current history, physical findings, labs and assessment and plan and agree with note as documented by resident MD ( Navya Wong)    Nic Jones MD, cardiology, performed coronary angiography:  1.  Cardiomyopathy. 2.  Essentially single-vessel coronary artery disease consisting of a   totally occluded but collateralized right coronary artery and branch   vessel disease consisting of diagonal branch disease. 3.  Successful Angio-Seal of the right femoral arteriotomy site.      We will continue current management    Kirill Ferguson MD, Ale Young

## 2021-06-29 NOTE — CONSULTS
Procedure: Stephanie cor, angioseal RFA  Complication: none  EBL<5 cc  Preliminary:  LM ok. LAD OK, D1 with long 75% prox disease, Intermed ok. Cx ok. RCA chronic total with extensive collateral L>R    Successful angioseal RFA.

## 2021-06-30 VITALS
BODY MASS INDEX: 36.22 KG/M2 | WEIGHT: 239 LBS | HEART RATE: 56 BPM | HEIGHT: 68 IN | TEMPERATURE: 97.2 F | DIASTOLIC BLOOD PRESSURE: 91 MMHG | OXYGEN SATURATION: 98 % | SYSTOLIC BLOOD PRESSURE: 149 MMHG | RESPIRATION RATE: 18 BRPM

## 2021-06-30 LAB
ANION GAP SERPL CALCULATED.3IONS-SCNC: 8 MMOL/L (ref 3–16)
BUN BLDV-MCNC: 16 MG/DL (ref 7–20)
CALCIUM SERPL-MCNC: 8.6 MG/DL (ref 8.3–10.6)
CHLORIDE BLD-SCNC: 106 MMOL/L (ref 99–110)
CO2: 24 MMOL/L (ref 21–32)
CREAT SERPL-MCNC: 1.2 MG/DL (ref 0.8–1.3)
GFR AFRICAN AMERICAN: >60
GFR NON-AFRICAN AMERICAN: >60
GLUCOSE BLD-MCNC: 126 MG/DL (ref 70–99)
POTASSIUM REFLEX MAGNESIUM: 4.2 MMOL/L (ref 3.5–5.1)
SODIUM BLD-SCNC: 138 MMOL/L (ref 136–145)

## 2021-06-30 PROCEDURE — 6370000000 HC RX 637 (ALT 250 FOR IP): Performed by: INTERNAL MEDICINE

## 2021-06-30 PROCEDURE — 80048 BASIC METABOLIC PNL TOTAL CA: CPT

## 2021-06-30 PROCEDURE — 6370000000 HC RX 637 (ALT 250 FOR IP): Performed by: STUDENT IN AN ORGANIZED HEALTH CARE EDUCATION/TRAINING PROGRAM

## 2021-06-30 PROCEDURE — 6360000002 HC RX W HCPCS: Performed by: STUDENT IN AN ORGANIZED HEALTH CARE EDUCATION/TRAINING PROGRAM

## 2021-06-30 PROCEDURE — 2580000003 HC RX 258: Performed by: STUDENT IN AN ORGANIZED HEALTH CARE EDUCATION/TRAINING PROGRAM

## 2021-06-30 PROCEDURE — 36415 COLL VENOUS BLD VENIPUNCTURE: CPT

## 2021-06-30 PROCEDURE — 2580000003 HC RX 258: Performed by: HOSPITALIST

## 2021-06-30 RX ORDER — CARVEDILOL 6.25 MG/1
6.25 TABLET ORAL 2 TIMES DAILY WITH MEALS
Qty: 60 TABLET | Refills: 3 | Status: SHIPPED | OUTPATIENT
Start: 2021-06-30 | End: 2022-07-29

## 2021-06-30 RX ORDER — ASPIRIN 81 MG/1
81 TABLET, CHEWABLE ORAL DAILY
Qty: 30 TABLET | Refills: 3 | Status: SHIPPED | OUTPATIENT
Start: 2021-07-01 | End: 2022-05-27

## 2021-06-30 RX ORDER — ATORVASTATIN CALCIUM 40 MG/1
40 TABLET, FILM COATED ORAL NIGHTLY
Qty: 30 TABLET | Refills: 3 | Status: SHIPPED | OUTPATIENT
Start: 2021-06-30 | End: 2021-07-07

## 2021-06-30 RX ORDER — NITROGLYCERIN 0.4 MG/1
TABLET SUBLINGUAL
Qty: 25 TABLET | Refills: 3 | Status: SHIPPED | OUTPATIENT
Start: 2021-06-30 | End: 2022-03-04 | Stop reason: SDUPTHER

## 2021-06-30 RX ORDER — SPIRONOLACTONE 25 MG/1
12.5 TABLET ORAL DAILY
Qty: 30 TABLET | Refills: 3 | Status: SHIPPED | OUTPATIENT
Start: 2021-07-01 | End: 2021-10-20

## 2021-06-30 RX ORDER — LOSARTAN POTASSIUM 50 MG/1
50 TABLET ORAL DAILY
Qty: 30 TABLET | Refills: 3 | Status: SHIPPED | OUTPATIENT
Start: 2021-07-01 | End: 2021-08-19

## 2021-06-30 RX ADMIN — Medication 10 ML: at 08:27

## 2021-06-30 RX ADMIN — SODIUM CHLORIDE: 9 INJECTION, SOLUTION INTRAVENOUS at 05:28

## 2021-06-30 RX ADMIN — LOSARTAN POTASSIUM 50 MG: 50 TABLET, FILM COATED ORAL at 08:25

## 2021-06-30 RX ADMIN — SPIRONOLACTONE 12.5 MG: 25 TABLET ORAL at 08:25

## 2021-06-30 RX ADMIN — ASPIRIN 81 MG: 81 TABLET, CHEWABLE ORAL at 08:25

## 2021-06-30 RX ADMIN — ALLOPURINOL 100 MG: 100 TABLET ORAL at 08:24

## 2021-06-30 RX ADMIN — HEPARIN SODIUM 5000 UNITS: 5000 INJECTION INTRAVENOUS; SUBCUTANEOUS at 05:31

## 2021-06-30 ASSESSMENT — PAIN SCALES - GENERAL
PAINLEVEL_OUTOF10: 0

## 2021-06-30 NOTE — PLAN OF CARE
Problem: Cardiac Output - Decreased:  Goal: Hemodynamic stability will improve  Description: Hemodynamic stability will improve  Outcome: Ongoing     Pt remains hemodynamically stable  with no signs of decrease cardiac output. Will continue to monitor. Problem: Pain:  Description: Pain management should include both nonpharmacologic and pharmacologic interventions. Goal: Control of chronic pain  Description: Control of chronic pain  Outcome: Ongoing     Pt denies pain throughout shift. Vital signs stable. Pt resting comfortably in bed continue to monitor. Problem: Skin Integrity:  Goal: Absence of new skin breakdown  Description: Absence of new skin breakdown  Outcome: Ongoing     Pt with no new skin breakdown during this shift. Vital signs are stable, pt able to transfer appropriately. Pt able to turn self. Continue with current care.

## 2021-06-30 NOTE — DISCHARGE INSTR - COC
Continuity of Care Form    Patient Name: Lianne Hedrick   :  1960  MRN:  [id number]    Admit date:  2021  Discharge date:  ***    Code Status Order: Full Code   Advance Directives:   Advance Care Flowsheet Documentation     Date/Time Healthcare Directive Type of Healthcare Directive Copy in [address] [facility] [address] Agent's Name Healthcare Agent's Phone Number    [phone]  No, patient does not have an advance directive for healthcare treatment -- -- -- -- --          Admitting Physician:  Monie Nava MD  PCP: Monie Nava MD      CASE MANAGEMENT/SOCIAL WORK SECTION    Inpatient Status Date: ***    Readmission Risk Assessment Score:  Readmission Risk              Risk of Unplanned Readmission:  11           Discharging to Facility/ Agency   · Name:   · Address:  · Phone:  · Fax:    Dialysis Facility (if applicable)   · Name:  · Address:  · Dialysis Schedule:  · Phone:  · Fax:    / signature: {Esignature:[id number]}    PHYSICIAN SECTION    Prognosis: {Prognosis:8546957873}    Condition at Discharge: [facility] Patient Condition:465418324}    Rehab Potential (if transferring to Rehab): {Prognosis:9829289475}    Recommended Labs or Other Treatments After Discharge: ***    Physician Certification: I certify the above information and transfer of Lianne Hedrick  is necessary for the continuing treatment of the diagnosis listed and that he requires {Admit to Appropriate Level of Care:14125} for {GREATER/LESS:394951703} 30 days.      Update Admission H&P: {CHP DME Changes in CROLR:913570281}    PHYSICIAN SIGNATURE:  {Esignature:[id number]}

## 2021-06-30 NOTE — PROGRESS NOTES
Pt discharged home. Pt expressed understanding of discharge teaching, new medication teaching, and follow up instructions. Questions answered at this time. IV and tele removed.  Electronically signed by Junior Yuan RN on 6/30/2021 at 10:52 AM

## 2021-07-01 ENCOUNTER — CARE COORDINATION (OUTPATIENT)
Dept: CASE MANAGEMENT | Age: 61
End: 2021-07-01

## 2021-07-01 DIAGNOSIS — I42.0 DILATED CARDIOMYOPATHY (HCC): Primary | ICD-10-CM

## 2021-07-01 NOTE — CARE COORDINATION
Pierre 45 Transitions Initial Follow Up Call    Call within 2 business days of discharge: Yes    Patient: Dorothy Kothari Patient : 1960   MRN: <[id number]>  Reason for Admission: CP  Discharge Date: 21 RARS: Readmission Risk Score: 11      Last Discharge [address] [address]       Complaint Diagnosis Description Type Department Provider    21 Chest Pain Chest pain, unspecified type . .. ED to Hosp-Admission (Discharged) (ADMITTED) [address] 4 PCU Vance Nguyen MD; Carlos Henson,... Spoke with: [address]: [facility]      Non-face-to-face services provided:  Unable to reach patient     Transitions of Care Initial Call    Was this an external facility discharge? No Discharge Facility: NA    Challenges to be reviewed by the provider   Additional needs identified to be addressed with provider: No  none             Method of communication with provider : phone      Advance Care Planning:   Does patient have an Advance Directive:  reviewed and current. Was this a readmission? No  Patient stated reason for admission: CP  Patients top risk factors for readmission: medical condition-CP    Care Transition Nurse (CTN) contacted the patient by telephone to perform post hospital discharge assessment. Verified name and  with patient as identifiers. Provided introduction to self, and explanation of the CTN role. CTN reviewed discharge instructions, medical action plan and red flags with patient who verbalized understanding. Patient given an opportunity to ask questions and does not have any further questions or concerns at this time. Were discharge instructions available to patient? Yes. Reviewed appropriate site of care based on symptoms and resources available to patient including: PCP and Specialist. The patient agrees to contact the PCP office for questions related to their healthcare.      Medication reconciliation was performed with patient, who verbalizes understanding of administration of home medications. Advised obtaining a 90-day supply of all daily and as-needed medications. Covid Risk Education     Educated patient about risk for severe COVID-19 due to risk factors according to CDC guidelines. LPN CC reviewed discharge instructions, medical action plan and red flag symptoms with the patient who verbalized understanding. Discussed COVID vaccination status: Yes. Education provided on COVID-19 vaccination as appropriate. Discussed exposure protocols and quarantine with CDC Guidelines. Patient was given an opportunity to verbalize any questions and concerns and agrees to contact LPN CC or health care provider for questions related to their healthcare. Reviewed and educated patient on any new and changed medications related to discharge diagnosis. Was patient discharged with a pulse oximeter? No Discussed and confirmed pulse oximeter discharge instructions and when to notify provider or seek emergency care. Attempted to reach patient via phone for initial post hospital transition call. VM left stating purpose of call along with my contact information requesting a return call. Inbound call from patient. Patient verified  and was pleasant and agreeable to transition calls. Patient denies pain. States he has some \"soreness\" but it is easily managed. Incision clean dry and intact. Patient reviewed incision care from discharge with LPN CC. Patient eager to return to active lifestyle, but is aware of activity restrictions. Denies SOB, CP. Full medication reconciliation and 1111f completed. Patient has not scheduled f/u visits yet. LPN CC encouraged patient to do so soon so that he can be seen in a timely manner. Patient states he plans to go there in person tomorrow to schedule as he will be in that part of town. Denies any acute needs at present time. Agreeable to f/u calls.   Educated on the use of urgent care or physicians 24 hr access line if assistance is needed after hours. LPN CC provided contact information. Plan for follow-up call in 5-7 days based on severity of symptoms and risk factors.   Richard Parikh 94  [phone]    Care Transitions 24 Hour Call    Care Transitions Interventions         Follow Up  Future Appointments   Date Time Provider Buzz Jung   7/2/2021  1:45 PM MYKEL Serrato - CNP [facility]       Pamella Panchal LPN

## 2021-07-07 ENCOUNTER — OFFICE VISIT (OUTPATIENT)
Dept: CARDIOLOGY CLINIC | Age: 61
End: 2021-07-07
Payer: COMMERCIAL

## 2021-07-07 VITALS
DIASTOLIC BLOOD PRESSURE: 76 MMHG | WEIGHT: 240 LBS | SYSTOLIC BLOOD PRESSURE: 136 MMHG | BODY MASS INDEX: 36.49 KG/M2 | HEART RATE: 64 BPM

## 2021-07-07 DIAGNOSIS — E78.5 HYPERLIPIDEMIA, UNSPECIFIED HYPERLIPIDEMIA TYPE: Primary | ICD-10-CM

## 2021-07-07 DIAGNOSIS — I25.10 CORONARY ARTERY DISEASE INVOLVING NATIVE CORONARY ARTERY OF NATIVE HEART WITHOUT ANGINA PECTORIS: ICD-10-CM

## 2021-07-07 DIAGNOSIS — I50.22 CHRONIC CLINICAL SYSTOLIC HEART FAILURE (HCC): Primary | ICD-10-CM

## 2021-07-07 PROCEDURE — 99215 OFFICE O/P EST HI 40 MIN: CPT | Performed by: INTERNAL MEDICINE

## 2021-07-07 RX ORDER — FUROSEMIDE 20 MG/1
20 TABLET ORAL DAILY
COMMUNITY
End: 2021-10-26

## 2021-07-07 RX ORDER — ROSUVASTATIN CALCIUM 20 MG/1
20 TABLET, COATED ORAL DAILY
Qty: 90 TABLET | Refills: 1 | Status: SHIPPED | OUTPATIENT
Start: 2021-07-07 | End: 2022-05-27

## 2021-07-07 NOTE — PROGRESS NOTES
Cc: CAD, HFrEF, HLP    HPI:     Mr Hal Barrera is a 60 yo overweight non-smoker man with h/o HTN, HLP, untreated VERN, HFrEF, CAD/MI, psoriasis. Patient's father and brother who were smokers had early CAD/MIs.     Echo 07/2016: LVEDD 6.1, EF 20%, normal thickness.      LHC 08/2016: Minimal CAD with luminal irregularities, EF 25-30%.    Echo 01/2019: LVEDD 5.9, EF 30%, normal thickness, normal RV and mild valve disease.      Ca score 0 (zero) 01/2019.     He is very interested in finding out if there are any other potential causes for his cardiomyopathy which could be treatable, because he \"does not want only to treat symptoms\" with meds. Hence, he had a CMR and blood tests to r/o causes of [address]      NICMO labs: serum free light chain assay, ferritin, MELY, ACE and TSH within normal limits, CRP mildly elevated. BMP normal.      CMR at [facility] 2/14/19: LV dilated, LVEF 40%, mild LVH, no LGE (hence no scar), normal ECV (hence no inflammation), normal RV size and function, normal valves and pericardium.     Patient was admitted at the [facility] hospital 6/276/29 due to atypical chest discomfort and was ruled out for acute MI.    ECG 6/28/2021: NSR, slight T wave inversions (new) inferior and lateral leads, cannot exclude ischemia. Radha 6/28/21: Moderate size, moderate intensity, mid to distal inferior, inferior lateral and apical wall mixed defect, primarily reversible, consistent with ischemia. Severely dilated LV with EF 38%, moderate diffuse hypokinesis, akinetic apex. LHC 6/29/2021: Mid RCA  with left-to-right collaterals, diagonal 75% stenosis, no intervention. FLP 6/20/2021: , HDL 34, LDL 81, . Patient returns to the clinic today for a follow-up. He reports no concerning symptoms. He had multiple questions about his recent cardiac catheterization, hyperlipidemia, need for medications.       Histories     Past Medical History:   has a past medical history of Arthritis, CHF (congestive heart failure) ([facility] [address] CHF (congestive heart failure) ([facility] [address] Hypertension, benign, Kidney stone, Lipoma, Other drug allergy(995.27), Psoriasis, Thyroid disease, and Unspecified sleep apnea. Surgical History:   has a past surgical history that includes Tonsillectomy; hernia repair; lipoma resection; joint replacement (10/27/2017); and other surgical history (Left, 10/27/2017). Social History:   reports that he has never smoked. He has never used smokeless tobacco. He reports current alcohol use. He reports that he does not use drugs. Family History:  Patient's father and brother who were smokers had early CAD/MIs. Medications:     Home medications were reviewed and are listed below    Prior to Admission medications    Medication Sig Start Date End Date Taking? Authorizing Provider   furosemide (LASIX) 20 MG tablet Take 20 mg by mouth daily   Yes Historical Provider, MD   aspirin 81 MG chewable tablet Take 1 tablet by mouth daily 7/1/21  Yes Nathalia Mccann MD   carvedilol (COREG) 6.25 MG tablet Take 1 tablet by mouth 2 times daily (with meals) 6/30/21  Yes Nathalia Mccann MD   losartan (COZAAR) 50 MG tablet Take 1 tablet by mouth daily 7/1/21  Yes Nathalia Mccann MD   nitroGLYCERIN (NITROSTAT) 0.4 MG SL tablet up to max of 3 total doses.  If no relief after 1 dose, call 911. 6/30/21  Yes Nathalia Mccann MD   spironolactone (ALDACTONE) 25 MG tablet Take 0.5 tablets by mouth daily 7/1/21  Yes Nathalia Mccann MD   allopurinol (ZYLOPRIM) 100 MG tablet TAKE 1 TABLET BY MOUTH EVERY DAY 6/10/21  Yes Esvin Brody MD   clobetasol (TEMOVATE) 0.05 % ointment APPLY EXTERNALLY TO THE AFFECTED AREA TWICE DAILY 5/18/21  Yes Esvin Brody MD   magnesium 30 MG tablet Take 400 mg by mouth daily    Yes Historical Provider, MD   Zinc Sulfate (ZINC 15 PO) Take by mouth   Yes Historical Provider, MD   vitamin E 400 UNIT capsule Take 400 Units by mouth daily   Yes Historical Provider, MD   Coenzyme Q10 (COQ10) 100 MG CAPS Take 4 tablets by mouth    Yes Historical Provider, MD   Glucosamine-Chondroit-Vit C-Mn (GLUCOSAMINE 1500 COMPLEX PO) Take by mouth   Yes Historical Provider, MD   Omega-3 Fatty Acids (FISH OIL) 1200 MG CAPS Take 2 capsules by mouth 3 tablets once a week    Yes Historical Provider, MD   B Complex Vitamins (VITAMIN-B COMPLEX) TABS Take by mouth   Yes Historical Provider, MD   magnesium citrate solution Take 296 mLs by mouth once   Yes Historical Provider, MD   vitamin D (CHOLECALCIFEROL) 400 UNIT TABS tablet Take 400 Units by mouth 2 times daily. 8/18/10  Yes Historical Provider, MD   rosuvastatin (CRESTOR) 20 MG tablet Take 1 tablet by mouth daily 7/7/21   Lilly Brito MD          Allergy:     Doxycycline, Hydrocodone, Iodides, Ketorolac tromethamine, Naproxen, Norco [hydrocodone-acetaminophen], Oxycodone-acetaminophen, Percocet [oxycodone-acetaminophen], and Clindamycin/lincomycin       Review of Systems:     All 12 point review of symptoms completed. Pertinent positives identified in the HPI, all other review of symptoms negative as below. CONSTITUTIONAL: No fatigue  SKIN: No rash or pruritis. EYES: No visual changes or diplopia. No scleral icterus. ENT: No Headaches, hearing loss or vertigo. No mouth sores or sore throat. CARDIOVASCULAR: No chest pain/chest pressure/chest discomfort. No palpitations. No edema. RESPIRATORY: No dyspnea. No cough or wheezing, no sputum production. GASTROINTESTINAL: No N/V/D. No abdominal pain, appetite loss, blood in stools. GENITOURINARY: No dysuria, trouble voiding, or hematuria. MUSCULOSKELETAL:  No gait disturbance, weakness or joint complaints. NEUROLOGICAL: No headache, diplopia, change in muscle strength, numbness or tingling. No change in gait, balance, coordination, mood, affect, memory, mentation, behavior. PSHYCH: No anxiety, loss of interest, change in sexual behavior, feelings of self-harm, or confusion.   ENDOCRINE: No excessive thirst, fluid intake, or urination. No tremor. HEMATOLOGIC: No abnormal bruising or bleeding. ALLERGY: No nasal congestion or hives.       Physical Examination:     Vitals:    07/07/21 0956   BP: 136/76   Pulse: 64   Weight: 240 lb (108.9 kg)       Wt Readings from Last 3 Encounters:   07/07/21 240 lb (108.9 kg)   06/30/21 239 lb (108.4 kg)   04/23/21 238 lb (108 kg)         General Appearance:  Alert, cooperative, no distress, appears stated age Appropriate weight   Head:  Normocephalic, without obvious abnormality, atraumatic   Eyes:  PERRL, conjunctiva/corneas clear EOM intact  Ears normal   Throat no lesions       Nose: Nares normal, no drainage or sinus tenderness   Throat: Lips, mucosa, and tongue normal   Neck: Supple, symmetrical, trachea midline, no adenopathy, thyroid: not enlarged, symmetric, no tenderness/mass/nodules, no carotid bruit       Lungs:   Clear to auscultation bilaterally, respirations unlabored   Chest Wall:  No tenderness or deformity   Heart:  Regular rhythm, rate is controlled, S1, S2 normal, there is no murmur, there is no rub or gallop, no jvd, no bilateral lower extremity edema   Abdomen:   Soft, non-tender, bowel sounds active all four quadrants,  no masses, no organomegaly       Extremities: Extremities normal, atraumatic, no cyanosis   Pulses: 2+ and symmetric   Skin: Skin color, texture, turgor normal, no rashes or lesions   Pysch: Normal mood and affect   Neurologic: Normal gross motor and sensory exam.  Cranial nerves intact        Labs:     Lab Results   Component Value Date    WBC 11.1 (H) 06/28/2021    HGB 14.1 06/28/2021    HCT 40.2 (L) 06/28/2021    MCV 90.2 06/28/2021     06/28/2021     Lab Results   Component Value Date     06/30/2021    K 4.2 06/30/2021     06/30/2021    CO2 24 06/30/2021    BUN 16 06/30/2021    CREATININE 1.2 06/30/2021    GLUCOSE 126 (H) 06/30/2021    CALCIUM 8.6 06/30/2021    PROT 7.2 05/27/2020    LABALBU 4.4 05/27/2020    BILITOT 0.6 05/27/2020 ALKPHOS 102 05/27/2020    AST 21 05/27/2020    ALT 29 05/27/2020    LABGLOM >60 06/30/2021    GFRAA >60 06/30/2021    AGRATIO 1.6 05/27/2020    GLOB 2.8 05/27/2020         Lab Results   Component Value Date    CHOL 158 06/28/2021    CHOL 180 05/27/2020    CHOL 174 11/30/2018     Lab Results   Component Value Date    TRIG 216 (H) 06/28/2021    TRIG 175 (H) 05/27/2020    TRIG 133 11/30/2018     Lab Results   Component Value Date    HDL 34 (L) 06/28/2021    HDL 41 05/27/2020    HDL 33 (L) 11/30/2018     Lab Results   Component Value Date    LDLCHOLESTEROL 128 02/20/2008    LDLCALC 81 06/28/2021    LDLCALC 104 (H) 05/27/2020    LDLCALC 114 (H) 11/30/2018     Lab Results   Component Value Date    LABVLDL 43 06/28/2021    LABVLDL 35 05/27/2020    LABVLDL 27 11/30/2018     No results found for: [facility]    Lab Results   Component Value Date    INR 0.98 10/10/2017    INR 1.05 08/02/2016    PROTIME 11.1 10/10/2017    PROTIME 12.0 08/02/2016       The 10-year ASCVD risk score (Kimmy Downs, et al., 2013) is: 13.8%    Values used to calculate the score:      Age: 56 years      Sex: Male      Is Non- : No      Diabetic: No      Tobacco smoker: No      Systolic Blood Pressure: 971 mmHg      Is BP treated: Yes      HDL Cholesterol: 34 mg/dL      Total Cholesterol: 158 mg/dL      Assessment / Plan:      Diagnosis Orders   1. Chronic clinical systolic heart failure ([facility] [address]     2. Coronary artery disease involving native coronary artery of native heart without angina pectoris           1. Chronic HFrEF:  It is mixed in etiology at this time (was purely nonischemic in 2016 with unremarkable LHC but now has evidence of occluded RCA per LHC in 2021). His NYHA FC is I, stage C and appears euvolemic and hemo stable. Cardiac MR suggests dilated cardiomyopathy of unknown origin (could be viral or genetic), no evidence of scar or active inflammation, LVEF improved, normal RV.   LHC as above, no need for intervention at this time.     -Continue with Coreg 6.25 twice daily, losartan 50 daily, spironolactone 12.5 daily, Lasix 20 daily.  -No ICD with LVEF > 35% but will need to follow LVEF carefully and continue to optimize GDMT. 2.  CAD status post MI:  Surprising results of recent LHC after reviewing all recent cardiac evaluation including LHC with minimal CAD in 2016 and CT calcium score of 0 in 2019. Patient did not have any typical ischemic symptoms. I suspect his significant CAD is most likely due to genetic predisposition. Continue with aspirin 81 mg daily, beta-blocker  I strongly recommended he started a statin. Patient would prefer to start Crestor 20 mg p.o. daily    3. Hyperlipidemia:  Lipid profile from 2021 was reviewed. It is borderline abnormal.    We will start Crestor 20 daily  We will repeat lipid profile, CPK, ALT in 2 to 3 months. Return in about 6 months (around 1/7/2022). I have spent 45 minutes of face to face time with the patient with more than 50% spent counseling and coordinating care. I have personally reviewed the reports and images of labs, radiological studies, cardiac studies including ECG's and telemetry, current and old medical records. The note was completed using EMR and Dragon dictation system. Every effort was made to ensure accuracy; however, inadvertent computerized transcription errors may be present. All questions and concerns were addressed to the patient/family. Alternatives to my treatment were discussed. I would like to thank you for providing me the opportunity to participate in the care of your patient. If you have any questions, please do not hesitate to contact me.      Nataly Michel MD, [facility] [place] [address] Phone: [phone]  Heart Failure Hotline: [phone]  Fax: [phone]

## 2021-07-13 ENCOUNTER — CARE COORDINATION (OUTPATIENT)
Dept: CASE MANAGEMENT | Age: 61
End: 2021-07-13

## 2021-08-15 DIAGNOSIS — I10 ESSENTIAL (PRIMARY) HYPERTENSION: ICD-10-CM

## 2021-08-15 DIAGNOSIS — I50.22 CHRONIC SYSTOLIC (CONGESTIVE) HEART FAILURE (HCC): ICD-10-CM

## 2021-08-17 RX ORDER — LOSARTAN POTASSIUM 50 MG/1
TABLET ORAL
Qty: 30 TABLET | Refills: 3 | OUTPATIENT
Start: 2021-08-17

## 2021-08-19 RX ORDER — LOSARTAN POTASSIUM 50 MG/1
TABLET ORAL
Qty: 180 TABLET | Refills: 1 | Status: SHIPPED | OUTPATIENT
Start: 2021-08-19 | End: 2022-01-07

## 2021-10-20 DIAGNOSIS — M1A.0720 IDIOPATHIC CHRONIC GOUT OF LEFT FOOT WITHOUT TOPHUS: ICD-10-CM

## 2021-10-20 RX ORDER — SPIRONOLACTONE 25 MG/1
TABLET ORAL
Qty: 90 TABLET | Refills: 3 | Status: SHIPPED | OUTPATIENT
Start: 2021-10-20 | End: 2022-07-29

## 2021-10-21 RX ORDER — ALLOPURINOL 100 MG/1
TABLET ORAL
Qty: 90 TABLET | Refills: 1 | Status: SHIPPED | OUTPATIENT
Start: 2021-10-21 | End: 2022-05-23

## 2021-10-25 NOTE — TELEPHONE ENCOUNTER
Requested Prescriptions     Pending Prescriptions Disp Refills    furosemide (LASIX) 20 MG tablet [Pharmacy Med Name: FUROSEMIDE 20 MG TABLET] 90 tablet 3     Sig: TAKE 1 TABLET BY MOUTH EVERY DAY                  Last Office Visit: 7/7/2021     Next Office Visit: 1/12/2022

## 2021-10-26 RX ORDER — FUROSEMIDE 20 MG/1
TABLET ORAL
Qty: 90 TABLET | Refills: 3 | Status: SHIPPED | OUTPATIENT
Start: 2021-10-26 | End: 2022-09-06 | Stop reason: SDUPTHER

## 2022-01-07 DIAGNOSIS — I50.22 CHRONIC SYSTOLIC (CONGESTIVE) HEART FAILURE (HCC): ICD-10-CM

## 2022-01-07 DIAGNOSIS — I10 ESSENTIAL (PRIMARY) HYPERTENSION: ICD-10-CM

## 2022-01-07 RX ORDER — LOSARTAN POTASSIUM 50 MG/1
TABLET ORAL
Qty: 180 TABLET | Refills: 1 | Status: SHIPPED | OUTPATIENT
Start: 2022-01-07 | End: 2022-05-23

## 2022-03-04 ENCOUNTER — TELEPHONE (OUTPATIENT)
Dept: CARDIOLOGY CLINIC | Age: 62
End: 2022-03-04

## 2022-03-04 RX ORDER — NITROGLYCERIN 0.4 MG/1
TABLET SUBLINGUAL
Qty: 25 TABLET | Refills: 3 | Status: SHIPPED | OUTPATIENT
Start: 2022-03-04 | End: 2022-07-08 | Stop reason: SDUPTHER

## 2022-03-04 RX ORDER — NITROGLYCERIN 0.4 MG/1
TABLET SUBLINGUAL
Qty: 25 TABLET | Refills: 3 | OUTPATIENT
Start: 2022-03-04

## 2022-03-04 NOTE — TELEPHONE ENCOUNTER
Pt called for refill:    nitroGLYCERIN (NITROSTAT) 0.4 MG SL tablet  up to max of 3 total doses.  If no relief after 1 dose, call 911., Disp-25 tablet, R-3    Last visit: 7/7/21  Next visit: 4/27/22  Last labs: 6/28/21  Last filled: 6/30/21

## 2022-05-22 DIAGNOSIS — I10 ESSENTIAL (PRIMARY) HYPERTENSION: ICD-10-CM

## 2022-05-22 DIAGNOSIS — I50.22 CHRONIC SYSTOLIC (CONGESTIVE) HEART FAILURE (HCC): ICD-10-CM

## 2022-05-22 DIAGNOSIS — M1A.0720 IDIOPATHIC CHRONIC GOUT OF LEFT FOOT WITHOUT TOPHUS: ICD-10-CM

## 2022-05-23 RX ORDER — CARVEDILOL 6.25 MG/1
TABLET ORAL
Qty: 180 TABLET | Refills: 1 | OUTPATIENT
Start: 2022-05-23

## 2022-05-23 RX ORDER — LOSARTAN POTASSIUM 50 MG/1
TABLET ORAL
Qty: 180 TABLET | Refills: 1 | Status: SHIPPED | OUTPATIENT
Start: 2022-05-23 | End: 2022-08-26 | Stop reason: ALTCHOICE

## 2022-05-23 RX ORDER — ALLOPURINOL 100 MG/1
TABLET ORAL
Qty: 90 TABLET | Refills: 1 | Status: SHIPPED | OUTPATIENT
Start: 2022-05-23 | End: 2022-09-05

## 2022-05-24 ENCOUNTER — TELEPHONE (OUTPATIENT)
Dept: CARDIOLOGY CLINIC | Age: 62
End: 2022-05-24

## 2022-05-24 NOTE — TELEPHONE ENCOUNTER
Informed pt he should come to his OV to discuss with Dr. Jessica Boss first and then Dr. Jessica Boss can order the labs for the pt. Pt verbalized understanding and plans to see GEB in the office on Friday to discuss.

## 2022-05-24 NOTE — TELEPHONE ENCOUNTER
Patient stopped in wanting a call regarding getting an order to get a blood test for his troponin today. Also patient has an appointment on 5.27.22 and would like to have all of these test done.     HOMEOSTATIC MODEL ASSESSMENT    PREGNENOLONE    CORONARY CALCIUM SCORE    C-REACTIVE PROTIEN    B-TYPE NATRIURETIC PEPTIDE    NMR LIPOPROFILE    HOMOCYSTEINE    A1C    CHOLESTEROL/HDL RATIO    25-HYDROXY VITAMIN D    BLOOD GLUCOSE    CARDIO IQ LIPOPROTEIN FRACTIONATION    OXIDIZED LDL     LP-LPA2( LIPOPROTEIN-ASSOCIATED PHOSPHOLIPASE A2     CAROTID INTIMA-MEDIA THICKNESS [facility] ED  Emergency Department Encounter  Emergency Medicine Resident     Pt Name: Stanislav Payton  MRN: [id number]  [place] 2001  Date of evaluation: 3/8/22  PCP:  Varun Rodriguez PA-C    [address] [place] [address]       Chief Complaint   Patient presents with    Panic Attack       HISTORY OFPRESENT ILLNESS  (Location/Symptom, Timing/Onset, Context/Setting, Quality, Duration, Modifying Factors,Severity.)      Stanislav Payton is a 23 y.o. male who presents after an anxiety attack at work. Patient states he is been under a lot of stress recently with his home situation and recently got started on an antidepressant. He has been on this for 5 days now, he states the first 2 days were not great but then he felt good. While at work tonight he became overwhelmed and felt extremely anxious, then vomited once, and told his boss he needed to leave work. On arrival to the emergency department he does not have any other symptoms but states he is just sad. He denies any suicidal homicidal ideations. PAST MEDICAL / SURGICAL / SOCIAL / FAMILY HISTORY      has a past medical history of Headache.     has no past surgical history on file.      Social History     Socioeconomic History    Marital status: Single     Spouse name: Not on file    Number of children: Not on file    Years of education: Not on file    Highest education level: Not on file   Occupational History    Not on file   Tobacco Use    Smoking status: Never Smoker    Smokeless tobacco: Never Used   Substance and Sexual Activity    Alcohol use: No     Alcohol/week: 0.0 standard drinks    Drug use: No    Sexual activity: Not on file   Other Topics Concern    Not on file   Social History Narrative    Not on file     Social Determinants of Health     Financial Resource Strain:     Difficulty of Paying Living Expenses: Not on file   Food Insecurity:     Worried About Running Out of Food in the Last Year: Not on file    Ran Out of Food in the Last Year: Not on file   Transportation Needs:     Lack of Transportation (Medical): Not on file    Lack of Transportation (Non-Medical): Not on file   Physical Activity:     Days of Exercise per Week: Not on file    Minutes of Exercise per Session: Not on file   Stress:     Feeling of Stress : Not on file   Social Connections:     Frequency of Communication with Friends and Family: Not on file    Frequency of Social Gatherings with Friends and Family: Not on file    Attends [employer] Services: Not on file    Active Member of [address] [place] [address] [employer] or Organizations: Not on file    Attends Club or Organization Meetings: Not on file    Marital Status: Not on file   Intimate Partner Violence:     Fear of Current or Ex-Partner: Not on file    Emotionally Abused: Not on file    Physically Abused: Not on file    Sexually Abused: Not on file   Housing Stability:     Unable to Pay for Housing in the Last Year: Not on file    Number of [place] in the Last Year: Not on file    Unstable Housing in the Last Year: Not on file       History reviewed. No pertinent family history. Allergies:  Patient has no known allergies. Home Medications:  Prior to Admission medications    Medication Sig Start Date End Date Taking? Authorizing Provider   Acetaminophen (TYLENOL) 325 MG CAPS Take by mouth    Historical Provider, MD       REVIEW OFSYSTEMS    (2-9 systems for level 4, 10 or more for level 5)      Review of Systems   Constitutional: Negative for chills and fever. HENT: Negative for congestion and rhinorrhea. Eyes: Negative for visual disturbance. Respiratory: Negative for cough and shortness of breath. Cardiovascular: Negative for chest pain. Gastrointestinal: Negative for abdominal pain, constipation, diarrhea, nausea and vomiting. Genitourinary: Negative for dysuria and frequency. Musculoskeletal: Negative for back pain and neck pain. Skin: Negative for rash.    Neurological: Negative for weakness, numbness and headaches. Psychiatric/Behavioral: The patient is nervous/anxious. PHYSICAL EXAM   (up to 7 for level 4, 8 or more forlevel 5)      INITIAL VITALS:     Vitals:    03/08/22 0229   BP: (!) 146/82   Pulse: 63   Resp: 16   Temp: 97.3 °F (36.3 °C)   TempSrc: Oral   SpO2: 98%   Weight: 140 lb (63.5 kg)      Physical Exam  Constitutional:       General: He is not in acute distress. Appearance: Normal appearance. He is not ill-appearing, toxic-appearing or diaphoretic. HENT:      Head: Normocephalic and atraumatic. Mouth/Throat:      Mouth: Mucous membranes are moist.      Pharynx: Oropharynx is clear. Eyes:      Extraocular Movements: Extraocular movements intact. Cardiovascular:      Rate and Rhythm: Normal rate and regular rhythm. Heart sounds: Normal heart sounds. No murmur heard. Pulmonary:      Effort: Pulmonary effort is normal. No respiratory distress. Breath sounds: Normal breath sounds. No wheezing or rhonchi. Abdominal:      Palpations: Abdomen is soft. Tenderness: There is no abdominal tenderness. Musculoskeletal:         General: Normal range of motion. Cervical back: Normal range of motion and neck supple. Skin:     General: Skin is warm and dry. Neurological:      General: No focal deficit present. Mental Status: He is alert and oriented to person, place, and time. DIFFERENTIAL  DIAGNOSIS     PLAN (LABS / IMAGING / EKG):  Orders Placed This Encounter   Procedures    EKG 12 Lead       MEDICATIONS ORDERED:  No orders of the defined types were placed in this encounter. Initial MDM/Plan/ED COURSE:    23 y.o. male who presents with concerns of an anxiety attack. Patient recently started Lexapro for depression treatment. He has only been on this for 5 days, he has never had an anxiety attack in the past.  On arrival here, patient states he is back to his baseline but just feels sad.   Heart and lung exam are unremarkable. Abdomen soft nontender. He otherwise appears well. He has no symptoms at the current time. Does not continue to feel nauseous. This is patient's first possible anxiety attack, will do EKG to ensure no arrhythmia or other cause of symptoms. We also discussed possible viral cause as there is currently a GI illness going around. EKG shows right axis and right bundle branch block without any ST or T wave changes. Normal rhythm, slightly bradycardic, no ischemic changes. Nonspecific EKG. Further discussion included education starting new antidepressant, this may take to be fully effective for the patient. Patient was encouraged to return to the emergency department if he has similar episode in the future as further work-up may be warranted at that time.      :     DIAGNOSTIC RESULTS / EMERGENCYDEPARTMENT COURSE / MDM     LABS:  Labs Reviewed - No data to display        No results found. EKG  EKG shows right axis and right bundle branch block without any ST or T wave changes. Normal rhythm, slightly bradycardic, no ischemic changes. Nonspecific EKG. All EKG's are interpreted by the Emergency Department Physicianwho either signs or Co-signs this chart in the absence of a cardiologist.      PROCEDURES:  None    CONSULTS:  None    CRITICAL CARE:  Please see attending note    FINAL IMPRESSION      1. Anxiety state    2.  History of depression          DISPOSITION / PLAN     DISPOSITION Decision To Discharge 03/08/2022 03:16:23 AM      [facility]  [facility]  [phone]    Schedule an appointment as soon as possible for a visit in 1 day      [facility] ED  [address] [facility] [address] [phone]    If symptoms worsen      DISCHARGE MEDICATIONS:  Discharge Medication List as of 3/8/2022  3:25 AM          Murali Patel DO  Emergency Medicine Resident    (Please note that portions of this note were completed with a voice recognition program.Efforts were made to edit the dictations but occasionally words are mis-transcribed.)       Krishna Aviles,   Resident  03/08/22 [id number]

## 2022-05-27 ENCOUNTER — OFFICE VISIT (OUTPATIENT)
Dept: CARDIOLOGY CLINIC | Age: 62
End: 2022-05-27
Payer: COMMERCIAL

## 2022-05-27 VITALS
SYSTOLIC BLOOD PRESSURE: 124 MMHG | BODY MASS INDEX: 35.31 KG/M2 | HEART RATE: 60 BPM | WEIGHT: 232.2 LBS | DIASTOLIC BLOOD PRESSURE: 74 MMHG

## 2022-05-27 DIAGNOSIS — I50.22 CHRONIC CLINICAL SYSTOLIC HEART FAILURE (HCC): ICD-10-CM

## 2022-05-27 DIAGNOSIS — E78.5 HYPERLIPIDEMIA, UNSPECIFIED HYPERLIPIDEMIA TYPE: Primary | ICD-10-CM

## 2022-05-27 DIAGNOSIS — Z79.899 LONG TERM USE OF DRUG: ICD-10-CM

## 2022-05-27 DIAGNOSIS — I42.8 NONISCHEMIC CARDIOMYOPATHY (HCC): ICD-10-CM

## 2022-05-27 PROCEDURE — 99215 OFFICE O/P EST HI 40 MIN: CPT | Performed by: INTERNAL MEDICINE

## 2022-05-27 RX ORDER — ROSUVASTATIN CALCIUM 20 MG/1
20 TABLET, COATED ORAL DAILY
Qty: 90 TABLET | Refills: 3 | Status: SHIPPED | OUTPATIENT
Start: 2022-05-27

## 2022-05-27 RX ORDER — ASPIRIN 81 MG/1
81 TABLET ORAL DAILY
Qty: 90 TABLET | Refills: 3 | Status: SHIPPED | OUTPATIENT
Start: 2022-05-27

## 2022-05-27 NOTE — PROGRESS NOTES
Cc: CAD, HFrEF, HLP    HPI:     Mr Mel Hay is a 60 yo overweight non-smoker man with h/o HTN, HLP, untreated VERN, HFrEF, CAD/MI, psoriasis. Patient's father and brother who were smokers had early CAD/MIs.     Echo 07/2016: LVEDD 6.1, EF 20%, normal thickness.      LHC 08/2016: Minimal CAD with luminal irregularities, EF 25-30%.    Echo 01/2019: LVEDD 5.9, EF 30%, normal thickness, normal RV and mild valve disease.      Ca score 0 (zero) 01/2019.     He is very interested in finding out if there are any other potential causes for his cardiomyopathy which could be treatable, because he \"does not want only to treat symptoms\" with meds. Hence, he had a CMR and blood tests to r/o causes of [address]      NICMO labs: serum free light chain assay, ferritin, MELY, ACE and TSH within normal limits, CRP mildly elevated. BMP normal.      CMR at [facility] 2/14/19: LV dilated, LVEF 40%, mild LVH, no LGE (hence no scar), normal ECV (hence no inflammation), normal RV size and function, normal valves and pericardium.      Patient was admitted at the [facility] hospital 6/27-6/29 due to atypical chest discomfort and was ruled out for acute MI.     ECG 6/28/2021: NSR, slight T wave inversions (new) inferior and lateral leads, cannot exclude ischemia.       [facility] 6/28/21: Moderate size, moderate intensity, mid to distal inferior, inferior lateral and apical wall mixed defect, primarily reversible, consistent with ischemia. Severely dilated LV with EF 38%, moderate diffuse hypokinesis, akinetic apex.     [facility] 6/29/2021: Mid RCA  with left-to-right collaterals, diagonal 75% stenosis, no intervention.     FLP 6/20/2021: , HDL 34, LDL 81, .     Patient returns to the clinic today for a follow-up. He reports occasional chest pains with moderate exertion (mowing the grass) which resolve with nitrostat. Patient has multiple questions and would like to get tested to find out the extent of myocardial damage he sustained with the RCA infarct. Histories     Past Medical History:   has a past medical history of Arthritis, CHF (congestive heart failure) ([facility] [address] [address] CHF (congestive heart failure) ([facility] [address] Hypertension, benign, Kidney stone, Lipoma, Other drug allergy(995.27), Psoriasis, Thyroid disease, and Unspecified sleep apnea. Surgical History:   has a past surgical history that includes Tonsillectomy; hernia repair; lipoma resection; joint replacement (10/27/2017); and other surgical history (Left, 10/27/2017). Social History:   reports that he has never smoked. He has never used smokeless tobacco. He reports current alcohol use. He reports that he does not use drugs. Family History:  No evidence for sudden cardiac death or premature CAD      Medications:     Home medications were reviewed and are listed below    Prior to Admission medications    Medication Sig Start Date End Date Taking? Authorizing Provider   aspirin EC 81 MG EC tablet Take 1 tablet by mouth daily 5/27/22  Yes Hollie Raymond MD   rosuvastatin (CRESTOR) 20 MG tablet Take 1 tablet by mouth daily 5/27/22  Yes Hollie Raymond MD   empagliflozin (JARDIANCE) 10 MG tablet Take 1 tablet by mouth daily 5/27/22  Yes Hollie Raymond MD   allopurinol (ZYLOPRIM) 100 MG tablet TAKE 1 TABLET BY MOUTH EVERY DAY 5/23/22  Yes Thelda Cabot, MD   losartan (COZAAR) 50 MG tablet TAKE 1 TABLET BY MOUTH TWICE A DAY 5/23/22  Yes Thelda Cabot, MD   nitroGLYCERIN (NITROSTAT) 0.4 MG SL tablet up to max of 3 total doses.  If no relief after 1 dose, call 911. 3/4/22  Yes Hollie Raymond MD   furosemide (LASIX) 20 MG tablet TAKE 1 TABLET BY MOUTH EVERY DAY 10/26/21  Yes Hollie Raymond MD   spironolactone (ALDACTONE) 25 MG tablet TAKE 1 TABLET BY MOUTH EVERY DAY  Patient taking differently: 12.5 mg daily  10/20/21  Yes Hollie Raymond MD   carvedilol (COREG) 6.25 MG tablet Take 1 tablet by mouth 2 times daily (with meals) 6/30/21  Yes Gordo Garcia MD   clobetasol (TEMOVATE) 0.05 % ointment APPLY EXTERNALLY TO THE AFFECTED AREA TWICE DAILY 5/18/21  Yes Alessio Ayala MD   magnesium 30 MG tablet Take 400 mg by mouth daily    Yes Historical Provider, MD   Zinc Sulfate (ZINC 15 PO) Take by mouth   Yes Historical Provider, MD   vitamin E 400 UNIT capsule Take 400 Units by mouth daily   Yes Historical Provider, MD   Coenzyme Q10 (COQ10) 100 MG CAPS Take 4 tablets by mouth    Yes Historical Provider, MD   Glucosamine-Chondroit-Vit C-Mn (GLUCOSAMINE 1500 COMPLEX PO) Take by mouth   Yes Historical Provider, MD   Omega-3 Fatty Acids (FISH OIL) 1200 MG CAPS Take 2 capsules by mouth 3 tablets once a week    Yes Historical Provider, MD   B Complex Vitamins (VITAMIN-B COMPLEX) TABS Take by mouth   Yes Historical Provider, MD   magnesium citrate solution Take 296 mLs by mouth once   Yes Historical Provider, MD   vitamin D (CHOLECALCIFEROL) 400 UNIT TABS tablet Take 400 Units by mouth 2 times daily. 8/18/10  Yes Historical Provider, MD          Allergy:     Doxycycline, Hydrocodone, Iodides, Ketorolac tromethamine, Naproxen, Norco [hydrocodone-acetaminophen], Oxycodone-acetaminophen, Percocet [oxycodone-acetaminophen], and Clindamycin/lincomycin       Review of Systems:     All 12 point review of symptoms completed. Pertinent positives identified in the HPI, all other review of symptoms negative as below. CONSTITUTIONAL: No fatigue  SKIN: No rash or pruritis. EYES: No visual changes or diplopia. No scleral icterus. ENT: No Headaches, hearing loss or vertigo. No mouth sores or sore throat. CARDIOVASCULAR: No chest pain/chest pressure/chest discomfort. No palpitations. No edema. RESPIRATORY: No dyspnea. No cough or wheezing, no sputum production. GASTROINTESTINAL: No N/V/D. No abdominal pain, appetite loss, blood in stools. GENITOURINARY: No dysuria, trouble voiding, or hematuria. MUSCULOSKELETAL:  No gait disturbance, weakness or joint complaints.   NEUROLOGICAL: No headache, diplopia, change in muscle strength, numbness or tingling. No change in gait, balance, coordination, mood, affect, memory, mentation, behavior. PSHYCH: No anxiety, loss of interest, change in sexual behavior, feelings of self-harm, or confusion. ENDOCRINE: No excessive thirst, fluid intake, or urination. No tremor. HEMATOLOGIC: No abnormal bruising or bleeding. ALLERGY: No nasal congestion or hives.       Physical Examination:     Vitals:    05/27/22 1408   BP: 124/74   Pulse: 60   Weight: 232 lb 3.2 oz (105.3 kg)       Wt Readings from Last 3 Encounters:   05/27/22 232 lb 3.2 oz (105.3 kg)   07/07/21 240 lb (108.9 kg)   06/30/21 239 lb (108.4 kg)         General Appearance:  Alert, cooperative, no distress, appears stated age Appropriate weight   Head:  Normocephalic, without obvious abnormality, atraumatic   Eyes:  PERRL, conjunctiva/corneas clear EOM intact  Ears normal   Throat no lesions       Nose: Nares normal, no drainage or sinus tenderness   Throat: Lips, mucosa, and tongue normal   Neck: Supple, symmetrical, trachea midline, no adenopathy, thyroid: not enlarged, symmetric, no tenderness/mass/nodules, no carotid bruit       Lungs:   Clear to auscultation bilaterally, respirations unlabored   Chest Wall:  No tenderness or deformity   Heart:  Regular rhythm, rate is controlled, S1, S2 normal, there is no murmur, there is no rub or gallop, cannot assess jvd, no bilateral lower extremity edema   Abdomen:   Soft, non-tender, bowel sounds active all four quadrants,  no masses, no organomegaly       Extremities: Extremities normal, atraumatic, no cyanosis   Pulses: 2+ and symmetric   Skin: Skin color, texture, turgor normal, no rashes or lesions   Pysch: Normal mood and affect   Neurologic: Normal gross motor and sensory exam.  Cranial nerves intact        Labs:     Lab Results   Component Value Date    WBC 11.1 (H) 06/28/2021    HGB 14.1 06/28/2021    HCT 40.2 (L) 06/28/2021    MCV 90.2 06/28/2021     06/28/2021     Lab Results Component Value Date     06/30/2021    K 4.2 06/30/2021     06/30/2021    CO2 24 06/30/2021    BUN 16 06/30/2021    CREATININE 1.2 06/30/2021    GLUCOSE 126 (H) 06/30/2021    CALCIUM 8.6 06/30/2021    PROT 7.2 05/27/2020    LABALBU 4.4 05/27/2020    BILITOT 0.6 05/27/2020    ALKPHOS 102 05/27/2020    AST 21 05/27/2020    ALT 29 05/27/2020    LABGLOM >60 06/30/2021    GFRAA >60 06/30/2021    AGRATIO 1.6 05/27/2020    GLOB 2.8 05/27/2020         Lab Results   Component Value Date    CHOL 158 06/28/2021    CHOL 180 05/27/2020    CHOL 174 11/30/2018     Lab Results   Component Value Date    TRIG 216 (H) 06/28/2021    TRIG 175 (H) 05/27/2020    TRIG 133 11/30/2018     Lab Results   Component Value Date    HDL 34 (L) 06/28/2021    HDL 41 05/27/2020    HDL 33 (L) 11/30/2018     Lab Results   Component Value Date    LDLCHOLESTEROL 128 02/20/2008    LDLCALC 81 06/28/2021    LDLCALC 104 (H) 05/27/2020    LDLCALC 114 (H) 11/30/2018     Lab Results   Component Value Date    LABVLDL 43 06/28/2021    LABVLDL 35 05/27/2020    LABVLDL 27 11/30/2018     No results found for: CHOLHDLRATIO    Lab Results   Component Value Date    INR 0.98 10/10/2017    INR 1.05 08/02/2016    PROTIME 11.1 10/10/2017    PROTIME 12.0 08/02/2016       The 10-year ASCVD risk score (Wily Larson et al., 2013) is: 12.8%    Values used to calculate the score:      Age: 59 years      Sex: Male      Is Non- : No      Diabetic: No      Tobacco smoker: No      Systolic Blood Pressure: 188 mmHg      Is BP treated: Yes      HDL Cholesterol: 34 mg/dL      Total Cholesterol: 158 mg/dL      Assessment / Plan:      Diagnosis Orders   1. Hyperlipidemia, unspecified hyperlipidemia type  Lipid, Fasting    Lipid, Fasting    ALT    CK    MRI CARDIAC W WO CONTRAST   2. Chronic clinical systolic heart failure [facility])  Basic Metabolic Panel    MRI CARDIAC W WO CONTRAST   3. Long term use of drug  Basic Metabolic Panel   4.  Nonischemic cardiomyopathy [facility])  MRI CARDIAC W WO CONTRAST        1. Chronic HFrEF:  It is mixed in etiology at this time (was purely nonischemic in 2016 with unremarkable LHC but now has evidence of occluded RCA per [facility] in 2021). His NYHA FC is I, stage C and appears euvolemic and hemo stable. Cardiac MR suggests dilated cardiomyopathy of unknown origin (could be viral or genetic), no evidence of scar or active inflammation, LVEF improved, normal RV. LHC as above, no need for intervention at this time.     -Continue with Coreg 6.25 twice daily, losartan 50 bid and spironolactone 12.5 daily, Lasix 20 daily.  -Will start Jardiance 10 daily per recent guidelines.   -No ICD with LVEF > 35% but will need to follow LVEF carefully and continue to optimize GDMT. - Patient would like to get tested with cardiac MRI to find out the extent of myocardial damage he sustained with the RCA infarct. We will order it. It will also provide information about his LVEF. Patient reports allergy to contrast, hence will premedicate him with solumedrol and benadryl prior to MRI. -Check a BMP     2. CAD status post MI:  Surprising results of recent LHC after reviewing all recent cardiac evaluation including LHC with minimal CAD in 2016 and CT calcium score of 0 in 2019. Patient did not have any typical ischemic symptoms. I suspect his significant CAD is most likely due to genetic predisposition.     -Continue with aspirin 81 mg daily, beta-blocker  -I asked patient to resume Crestor 20 mg p.o. daily     3. Hyperlipidemia:  Lipid profile from 2021 was reviewed. It is borderline abnormal.     -We will start Crestor 20 daily  -We check a lipid profile now and in 3 months; also check CPK, ALT in 3 months.              Will schedule a follow up visit in [facility]      I have spent 43 minutes of face to face time with the patient with more than 50% spent counseling and coordinating care.        I have personally reviewed the reports and images of labs, radiological studies, cardiac studies including ECG's and telemetry, current and old medical records. The note was completed using EMR and Dragon dictation system. Every effort was made to ensure accuracy; however, inadvertent computerized transcription errors may be present. All questions and concerns were addressed to the patient/family. Alternatives to my treatment were discussed. I would like to thank you for providing me the opportunity to participate in the care of your patient. If you have any questions, please do not hesitate to contact me.      Jono Gil MD, [address] Office Phone: [phone]  Fax: [phone]

## 2022-06-01 RX ORDER — PREDNISONE 20 MG/1
40 TABLET ORAL ONCE
Qty: 2 TABLET | Refills: 0 | Status: SHIPPED | OUTPATIENT
Start: 2022-06-01 | End: 2022-06-01

## 2022-06-01 NOTE — PROGRESS NOTES
Informed pt Sussy the pharmacist at [place] [id number] suggests Benadryl 25 mg once and prednisone 40 mg once one hour prior to Cardiac MRI. Pt verbalized understanding.

## 2022-06-10 DIAGNOSIS — I50.22 CHRONIC CLINICAL SYSTOLIC HEART FAILURE (HCC): ICD-10-CM

## 2022-06-10 DIAGNOSIS — E78.5 HYPERLIPIDEMIA, UNSPECIFIED HYPERLIPIDEMIA TYPE: ICD-10-CM

## 2022-06-10 DIAGNOSIS — Z79.899 LONG TERM USE OF DRUG: ICD-10-CM

## 2022-06-10 LAB
ANION GAP SERPL CALCULATED.3IONS-SCNC: 13 MMOL/L (ref 3–16)
BUN BLDV-MCNC: 22 MG/DL (ref 7–20)
CALCIUM SERPL-MCNC: 9.2 MG/DL (ref 8.3–10.6)
CHLORIDE BLD-SCNC: 103 MMOL/L (ref 99–110)
CHOLESTEROL, FASTING: 102 MG/DL (ref 0–199)
CO2: 23 MMOL/L (ref 21–32)
CREAT SERPL-MCNC: 1.2 MG/DL (ref 0.8–1.3)
GFR AFRICAN AMERICAN: >60
GFR NON-AFRICAN AMERICAN: >60
GLUCOSE BLD-MCNC: 167 MG/DL (ref 70–99)
HDLC SERPL-MCNC: 45 MG/DL (ref 40–60)
LDL CHOLESTEROL CALCULATED: 48 MG/DL
POTASSIUM SERPL-SCNC: 4.6 MMOL/L (ref 3.5–5.1)
SODIUM BLD-SCNC: 139 MMOL/L (ref 136–145)
TRIGLYCERIDE, FASTING: 45 MG/DL (ref 0–150)
VLDLC SERPL CALC-MCNC: 9 MG/DL

## 2022-07-07 ENCOUNTER — PATIENT MESSAGE (OUTPATIENT)
Dept: CARDIOLOGY CLINIC | Age: 62
End: 2022-07-07

## 2022-07-08 RX ORDER — NITROGLYCERIN 0.4 MG/1
TABLET SUBLINGUAL
Qty: 25 TABLET | Refills: 3 | Status: SHIPPED | OUTPATIENT
Start: 2022-07-08 | End: 2022-09-01 | Stop reason: SDUPTHER

## 2022-07-08 RX ORDER — NITROGLYCERIN 0.4 MG/1
TABLET SUBLINGUAL
Qty: 25 TABLET | Refills: 3 | OUTPATIENT
Start: 2022-07-08

## 2022-07-08 NOTE — TELEPHONE ENCOUNTER
From: Gama Romero  To: Dr. Onel Willis  Sent: 7/7/2022 9:34 PM EDT  Subject: Nitroglycerin    Good morning Dr. Kristy Miles, or whomever it is that reads these messages. I will be hiking in the [facility] [place] for the next couple of weeks. I have only a few nitro tablets left and do not have any refills remaining on my prescription. Please send a new prescription for Nitroglycerin Tablets to the [facility] [address] and [facility] [address]. I am leaving [facility] Saturday, 07/09, so I need that prescription filled tomorrow. Sorry for the short notice, I only thought of it 30 minutes ago.   Daniel Krause, bdate: 1960

## 2022-07-26 ENCOUNTER — HOSPITAL ENCOUNTER (OUTPATIENT)
Dept: MRI IMAGING | Age: 62
Discharge: HOME | End: 2022-07-26
Payer: COMMERCIAL

## 2022-07-26 DIAGNOSIS — I42.8 NONISCHEMIC CARDIOMYOPATHY (HCC): ICD-10-CM

## 2022-07-26 DIAGNOSIS — E78.5 HYPERLIPIDEMIA, UNSPECIFIED HYPERLIPIDEMIA TYPE: ICD-10-CM

## 2022-07-26 DIAGNOSIS — I50.22 CHRONIC CLINICAL SYSTOLIC HEART FAILURE (HCC): ICD-10-CM

## 2022-07-26 PROCEDURE — 75565 CARD MRI VELOC FLOW MAPPING: CPT | Performed by: INTERNAL MEDICINE

## 2022-07-26 PROCEDURE — 75565 CARD MRI VELOC FLOW MAPPING: CPT

## 2022-07-26 PROCEDURE — A9585 GADOBUTROL INJECTION: HCPCS | Performed by: INTERNAL MEDICINE

## 2022-07-26 PROCEDURE — 6360000004 HC RX CONTRAST MEDICATION: Performed by: INTERNAL MEDICINE

## 2022-07-26 PROCEDURE — 75561 CARDIAC MRI FOR MORPH W/DYE: CPT | Performed by: INTERNAL MEDICINE

## 2022-07-26 RX ADMIN — GADOBUTROL 16 ML: 604.72 INJECTION INTRAVENOUS at 10:59

## 2022-07-27 PROBLEM — E78.5 HYPERLIPIDEMIA: Status: ACTIVE | Noted: 2022-07-27

## 2022-07-27 LAB
LV EF: 28 %
LVEF MODALITY: NORMAL

## 2022-07-29 ENCOUNTER — OFFICE VISIT (OUTPATIENT)
Dept: CARDIOLOGY CLINIC | Age: 62
End: 2022-07-29
Payer: COMMERCIAL

## 2022-07-29 VITALS
HEART RATE: 79 BPM | BODY MASS INDEX: 35.43 KG/M2 | SYSTOLIC BLOOD PRESSURE: 120 MMHG | WEIGHT: 233 LBS | DIASTOLIC BLOOD PRESSURE: 58 MMHG

## 2022-07-29 DIAGNOSIS — I50.22 CHRONIC CLINICAL SYSTOLIC HEART FAILURE (HCC): Primary | ICD-10-CM

## 2022-07-29 PROCEDURE — 99215 OFFICE O/P EST HI 40 MIN: CPT | Performed by: INTERNAL MEDICINE

## 2022-07-29 RX ORDER — CARVEDILOL 12.5 MG/1
12.5 TABLET ORAL 2 TIMES DAILY
Qty: 180 TABLET | Refills: 3 | Status: SHIPPED | OUTPATIENT
Start: 2022-07-29

## 2022-07-29 RX ORDER — SPIRONOLACTONE 25 MG/1
12.5 TABLET ORAL DAILY
Qty: 45 TABLET | Refills: 3 | Status: SHIPPED | OUTPATIENT
Start: 2022-07-29

## 2022-07-29 NOTE — PROGRESS NOTES
Cc: CAD, HFrEF, HLP    HPI:     Mr Valencia Talbert is a 55 yo overweight non-smoker man with h/o HTN, HLP, untreated VERN, HFrEF, CAD/MI, psoriasis. Patient's father and brother who were smokers had early CAD/MIs. Echo 07/2016: LVEDD 6.1, EF 20%, normal thickness. LHC 08/2016: Minimal CAD with luminal irregularities, EF 25-30%. Echo 01/2019: LVEDD 5.9, EF 30%, normal thickness, normal RV and mild valve disease. Ca score 0 (zero) 01/2019. He is very interested in finding out if there are any other potential causes for his cardiomyopathy which could be treatable, because he \"does not want only to treat symptoms\" with meds. Hence, he had a CMR and blood tests to r/o causes of [address] labs: serum free light chain assay, ferritin, MELY, ACE and TSH within normal limits, CRP mildly elevated. BMP normal.     CMR at [place] [address] 2/14/19: LV dilated, LVEF 40%, mild LVH, no LGE (hence no scar), normal ECV (hence no inflammation), normal RV size and function, normal valves and pericardium. Patient was admitted at the [facility] 6/27-6/29 due to atypical chest discomfort and was ruled out for acute MI.     ECG 6/28/2021: NSR, slight T wave inversions (new) inferior and lateral leads, cannot exclude ischemia. [facility]ha 6/28/21: Moderate size, moderate intensity, mid to distal inferior, inferior lateral and apical wall mixed defect, primarily reversible, consistent with ischemia. Severely dilated LV with EF 38%, moderate diffuse hypokinesis, akinetic apex. [facility] 6/29/2021: Mid RCA  with left-to-right collaterals, diagonal 75% stenosis, no intervention. [facility] [place] 06/2022: 102 / 45 / 48/ 45 on Crestor 20 daily    Patient wanted to get tested with cardiac MRI to find out the extent of myocardial damage he sustained with the RCA infarct. CMR at [facility] 07/2022: LVD, EF 28%, normal RV size with mildly decreased EF of 45%, LGE c/w basal to mid inferolateral wall subendocardial scar.       Patient returns to the clinic today for a follow-up. He went out hiking [place] and noticed increased exertional dyspnea. Histories     Past Medical History:   has a past medical history of Arthritis, CHF (congestive heart failure) ([address] CHF (congestive heart failure) ([facility] [address] Hypertension, benign, Kidney stone, Lipoma, Other drug allergy(995.27), Psoriasis, Thyroid disease, and Unspecified sleep apnea. Surgical History:   has a past surgical history that includes Tonsillectomy; hernia repair; lipoma resection; joint replacement (10/27/2017); and other surgical history (Left, 10/27/2017). Social History:   reports that he has never smoked. He has never used smokeless tobacco. He reports current alcohol use. He reports that he does not use drugs. Family History:  No evidence for sudden cardiac death or premature CAD      Medications:     Home medications were reviewed and are listed below    Prior to Admission medications    Medication Sig Start Date End Date Taking? Authorizing Provider   carvedilol (COREG) 12.5 MG tablet Take 1 tablet by mouth in the morning and 1 tablet before bedtime. 7/29/22  Yes Pati Gonzalez MD   spironolactone (ALDACTONE) 25 MG tablet Take 0.5 tablets by mouth in the morning. 7/29/22  Yes Pati Gonzalez MD   nitroGLYCERIN (NITROSTAT) 0.4 MG SL tablet up to max of 3 total doses.  If no relief after 1 dose, call 911. 7/8/22  Yes Pati Gonzalez MD   aspirin EC 81 MG EC tablet Take 1 tablet by mouth daily 5/27/22  Yes Pati Gonzalez MD   rosuvastatin (CRESTOR) 20 MG tablet Take 1 tablet by mouth daily 5/27/22  Yes Pati Gonzalez MD   empagliflozin (JARDIANCE) 10 MG tablet Take 1 tablet by mouth daily 5/27/22  Yes Pati Gonzalez MD   allopurinol (ZYLOPRIM) 100 MG tablet TAKE 1 TABLET BY MOUTH EVERY DAY 5/23/22  Yes Anamaria Briones MD   losartan (COZAAR) 50 MG tablet TAKE 1 TABLET BY MOUTH TWICE A DAY 5/23/22  Yes Anamaria Briones MD   furosemide (LASIX) 20 MG tablet TAKE 1 TABLET BY MOUTH EVERY DAY 10/26/21  Yes Nisha Emmanuel MD   clobetasol (TEMOVATE) 0.05 % ointment APPLY EXTERNALLY TO THE AFFECTED AREA TWICE DAILY 5/18/21  Yes Pearl Cabral MD   magnesium 30 MG tablet Take 400 mg by mouth daily    Yes Historical Provider, MD   Zinc Sulfate (ZINC 15 PO) Take by mouth   Yes Historical Provider, MD   vitamin E 400 UNIT capsule Take 400 Units by mouth daily   Yes Historical Provider, MD   Coenzyme Q10 (COQ10) 100 MG CAPS Take 4 tablets by mouth    Yes Historical Provider, MD   Glucosamine-Chondroit-Vit C-Mn (GLUCOSAMINE 1500 COMPLEX PO) Take by mouth   Yes Historical Provider, MD   Omega-3 Fatty Acids (FISH OIL) 1200 MG CAPS Take 2 capsules by mouth 3 tablets once a week    Yes Historical Provider, MD   B Complex Vitamins (VITAMIN-B COMPLEX) TABS Take by mouth   Yes Historical Provider, MD   vitamin D (CHOLECALCIFEROL) 400 UNIT TABS tablet Take 400 Units by mouth 2 times daily. 8/18/10  Yes Historical Provider, MD          Allergy:     Doxycycline, Hydrocodone, Iodides, Ketorolac tromethamine, Naproxen, Norco [hydrocodone-acetaminophen], Oxycodone-acetaminophen, Percocet [oxycodone-acetaminophen], and Clindamycin/lincomycin       Review of Systems:     All 12 point review of symptoms completed. Pertinent positives identified in the HPI, all other review of symptoms negative as below. CONSTITUTIONAL: No fatigue  SKIN: No rash or pruritis. EYES: No visual changes or diplopia. No scleral icterus. ENT: No Headaches, hearing loss or vertigo. No mouth sores or sore throat. CARDIOVASCULAR: No chest pain/chest pressure/chest discomfort. No palpitations. No edema. RESPIRATORY: +  dyspnea. No cough or wheezing, no sputum production. GASTROINTESTINAL: No N/V/D. No abdominal pain, appetite loss, blood in stools. GENITOURINARY: No dysuria, trouble voiding, or hematuria. MUSCULOSKELETAL:  No gait disturbance, weakness or joint complaints.   NEUROLOGICAL: No headache, diplopia, change in muscle strength, numbness or tingling. No change in gait, balance, coordination, mood, affect, memory, mentation, behavior. PSHYCH: No anxiety, loss of interest, change in sexual behavior, feelings of self-harm, or confusion. ENDOCRINE: No excessive thirst, fluid intake, or urination. No tremor. HEMATOLOGIC: No abnormal bruising or bleeding. ALLERGY: No nasal congestion or hives.       Physical Examination:     Vitals:    07/29/22 1336   BP: (!) 120/58   Pulse: 79   Weight: 233 lb (105.7 kg)       Wt Readings from Last 3 Encounters:   07/29/22 233 lb (105.7 kg)   05/27/22 232 lb 3.2 oz (105.3 kg)   07/07/21 240 lb (108.9 kg)         General Appearance:  Alert, cooperative, no distress, appears stated age Appropriate weight   Head:  Normocephalic, without obvious abnormality, atraumatic   Eyes:  PERRL, conjunctiva/corneas clear EOM intact  Ears normal   Throat no lesions       Nose: Nares normal, no drainage or sinus tenderness   Throat: Lips, mucosa, and tongue normal   Neck: Supple, symmetrical, trachea midline, no adenopathy, thyroid: not enlarged, symmetric, no tenderness/mass/nodules, no carotid bruit       Lungs:   Clear to auscultation bilaterally, respirations unlabored   Chest Wall:  No tenderness or deformity   Heart:  Regular rhythm with frequent extra beats, rate is controlled, S1, S2 normal, there is no murmur, there is no rub or gallop, cannot assess jvd, no bilateral lower extremity edema   Abdomen:   Soft, non-tender, bowel sounds active all four quadrants,  no masses, no organomegaly       Extremities: Extremities normal, atraumatic, no cyanosis   Pulses: 2+ and symmetric   Skin: Skin color, texture, turgor normal, no rashes or lesions   Pysch: Normal mood and affect   Neurologic: Normal gross motor and sensory exam.  Cranial nerves intact        Labs:     Lab Results   Component Value Date    WBC 11.1 (H) 06/28/2021    HGB 14.1 06/28/2021    HCT 40.2 (L) 06/28/2021    MCV 90.2 06/28/2021     06/28/2021     Lab Results   Component Value Date     06/10/2022    K 4.6 06/10/2022     06/10/2022    CO2 23 06/10/2022    BUN 22 (H) 06/10/2022    CREATININE 1.2 06/10/2022    GLUCOSE 167 (H) 06/10/2022    CALCIUM 9.2 06/10/2022    PROT 7.2 05/27/2020    LABALBU 4.4 05/27/2020    BILITOT 0.6 05/27/2020    ALKPHOS 102 05/27/2020    AST 21 05/27/2020    ALT 29 05/27/2020    LABGLOM >60 06/10/2022    GFRAA >60 06/10/2022    AGRATIO 1.6 05/27/2020    GLOB 2.8 05/27/2020         Lab Results   Component Value Date    CHOL 158 06/28/2021    CHOL 180 05/27/2020    CHOL 174 11/30/2018     Lab Results   Component Value Date    TRIG 216 (H) 06/28/2021    TRIG 175 (H) 05/27/2020    TRIG 133 11/30/2018     Lab Results   Component Value Date    HDL 45 06/10/2022    HDL 34 (L) 06/28/2021    HDL 41 05/27/2020     Lab Results   Component Value Date    LDLCHOLESTEROL 128 02/20/2008    LDLCALC 48 06/10/2022    LDLCALC 81 06/28/2021    LDLCALC 104 (H) 05/27/2020     Lab Results   Component Value Date    LABVLDL 9 06/10/2022    LABVLDL 43 06/28/2021    LABVLDL 35 05/27/2020     No results found for: [facility]    Lab Results   Component Value Date    INR 0.98 10/10/2017    INR 1.05 08/02/2016    PROTIME 11.1 10/10/2017    PROTIME 12.0 08/02/2016       The ASCVD Risk score (Jeralene Brunner., et al., 2013) failed to calculate for the following reasons: The valid total cholesterol range is 130 to 320 mg/dL      Assessment / Plan:      Diagnosis Orders   1. Chronic clinical systolic heart failure (HCC)             1. Chronic HFrEF:  It is mixed in etiology at this time (was purely nonischemic in 2016 with unremarkable LHC but now has evidence of occluded RCA with collaterals from the left, per LHC in 2021 and subendocardial scar per CMR). His NYHA FC is II, stage C and appears euvolemic and hemo stable.       -Increase Coreg 12.5 twice daily  -Will continue with losartan 50 bid for now but plan to switch to Entresto low dose next time.   -Cw spironolactone 12.5 daily, Lasix 20 daily.  -Cw Jardiance 10 daily per recent guidelines.   -Will refer to EP for ICD implant.   -We offered a Lifevest but patient politely declined. 2.  CAD status post MI:  Surprising results of recent LHC after reviewing all recent cardiac evaluation including LHC with minimal CAD in 2016 and CT calcium score of 0 in 2019. Patient did not have any typical ischemic symptoms. I suspect his significant CAD is most likely due to genetic predisposition.     -Continue with aspirin 81 mg daily, beta-blocker, Crestor 20 mg p.o. daily     3. Hyperlipidemia:  Lipid profile from 2022 acceptable on statin.      -Cw Crestor 20 daily          We will schedule a follow up visit in 1 month      I have spent 42 minutes of face to face time with the patient with more than 50% spent counseling and coordinating care. I have personally reviewed the reports and images of labs, radiological studies, cardiac studies including ECG's and telemetry, current and old medical records. The note was completed using EMR and Dragon dictation system. Every effort was made to ensure accuracy; however, inadvertent computerized transcription errors may be present. All questions and concerns were addressed to the patient/family. Alternatives to my treatment were discussed. I would like to thank you for providing me the opportunity to participate in the care of your patient. If you have any questions, please do not hesitate to contact me.      Moise Berry MD, [facility] - [address] Office Phone: [phone]  Fax: [phone]

## 2022-08-26 ENCOUNTER — OFFICE VISIT (OUTPATIENT)
Dept: CARDIOLOGY CLINIC | Age: 62
End: 2022-08-26
Payer: COMMERCIAL

## 2022-08-26 VITALS
DIASTOLIC BLOOD PRESSURE: 72 MMHG | BODY MASS INDEX: 35.8 KG/M2 | HEART RATE: 68 BPM | WEIGHT: 236.2 LBS | HEIGHT: 68 IN | SYSTOLIC BLOOD PRESSURE: 134 MMHG

## 2022-08-26 DIAGNOSIS — I25.10 CORONARY ARTERY DISEASE INVOLVING NATIVE CORONARY ARTERY OF NATIVE HEART WITHOUT ANGINA PECTORIS: ICD-10-CM

## 2022-08-26 DIAGNOSIS — E78.2 MIXED HYPERLIPIDEMIA: ICD-10-CM

## 2022-08-26 DIAGNOSIS — I10 HYPERTENSION, BENIGN: ICD-10-CM

## 2022-08-26 DIAGNOSIS — I50.22 CHRONIC SYSTOLIC HEART FAILURE (HCC): Primary | ICD-10-CM

## 2022-08-26 PROCEDURE — 99214 OFFICE O/P EST MOD 30 MIN: CPT | Performed by: NURSE PRACTITIONER

## 2022-08-26 NOTE — PROGRESS NOTES
CC CHF     HPI:  59 y.o. patient of Dr Melina Sandhu with HFpEF, CAD/MI, HTN, HLD and  VERN who is here for CHF management. He has appointment with Dr Javy Saavedra next month to discuss ICD. He denies cp, sob, LH/dizziness, syncope or falls. He feels skipped beats at times. He denies weight gain, orthopnea, PND or early satiety. He has abdominal bloating at times. No n/v/d, fever, or GI/ bleeding. He works a very physical job and sweats a lot . Past Medical History:   Diagnosis Date    Arthritis     CHF (congestive heart failure) (HCC)     CHF (congestive heart failure) (HCC)     Hypertension, benign     Kidney stone     Lipoma     removal righy shoulder    Other drug allergy(995.27)     Psoriasis     pt states it is worse since being on Metoprolol    Thyroid disease     enlarged    Unspecified sleep apnea     no CPAP. Has not done a sleep study     Past Surgical History:   Procedure Laterality Date    HERNIA REPAIR      right     JOINT REPLACEMENT  10/27/2017    left shoulder    LIPOMA RESECTION      OTHER SURGICAL HISTORY Left 10/27/2017    LEFT SHOULDER RESURFACTING HEMIARTHROPLASTY, OPEN BICEPS    TONSILLECTOMY       Family History   Problem Relation Age of Onset    Other Mother         DJD    Other Father         idiopathic cardiomyopathy     Social History     Tobacco Use    Smoking status: Never    Smokeless tobacco: Never   Substance Use Topics    Alcohol use: Yes     Comment: occ    Drug use: No     Allergies:Doxycycline, Hydrocodone, Iodides, Ketorolac tromethamine, Naproxen, Norco [hydrocodone-acetaminophen], Oxycodone-acetaminophen, Percocet [oxycodone-acetaminophen], and Clindamycin/lincomycin    Review of Systems  General: No changes in weight, fatigue, or night sweats. HEENT: No blurry or decreased vision. No changes in hearing, nasal discharge or sore throat. Cardiovascular:  See HPI. Respiratory: No cough, hemoptysis, or wheezing.    Gastrointestinal:  No abdominal pain, hematochezia, melana, constipation, diarrhea, or history of GI ulcers. Genito-Urinary: No dysuria or hematuria. No urgency or polyuria. Musculoskeletal:  No complaints of joint pain, joint swelling or muscular weakness/soreness. Neurological:  No dizziness, headaches, numbness/tingling, speech problems or weakness. Psychological:  No anxiety or depression. Hematological and Lymphatic: No abnormal bleeding or bruising, blood clots, jaundice or swollen lymph nodes. Endocrine:   No malaise/lethargy, palpitations, polydipsia/polyuria, temperature intolerance or unexpected weight changes  Skin:  No rashes or non-healing ulcers. Physical Exam:  /72   Pulse 68   Ht 5' 8\" (1.727 m)   Wt 236 lb 3.2 oz (107.1 kg)   BMI 35.91 kg/m²    General (appearance):  No acute distress  Eyes: anicteric   Neck: soft, No JVD  Ears/Nose/Mouth/Thorat: No cyanosis  CV: RRR   Respiratory:  clear, normal effort  GI: soft, non-tender, non-distended  Skin: Warm, dry. No rashes  Neuro/Psych: Alert and oriented x 3. Appropriate behavior  Ext:  No c/c.  No edema  Pulses:  2+ radial     Weight  Wt Readings from Last 3 Encounters:   08/26/22 236 lb 3.2 oz (107.1 kg)   07/29/22 233 lb (105.7 kg)   05/27/22 232 lb 3.2 oz (105.3 kg)          CBC:   Lab Results   Component Value Date    WBC 11.1 (H) 06/28/2021    HGB 14.1 06/28/2021    HCT 40.2 (L) 06/28/2021    MCV 90.2 06/28/2021     06/28/2021     BMP:  Lab Results   Component Value Date    CREATININE 1.2 06/10/2022    BUN 22 (H) 06/10/2022     06/10/2022    K 4.6 06/10/2022     06/10/2022    CO2 23 06/10/2022     Mag:   Lab Results   Component Value Date/Time    MG 2.10 02/20/2018 07:50 PM     LIVER PROFILE:   Lab Results   Component Value Date    ALT 29 05/27/2020    AST 21 05/27/2020    ALKPHOS 102 05/27/2020    BILITOT 0.6 05/27/2020     PT/INR:   Lab Results   Component Value Date    INR 0.98 10/10/2017    INR 1.05 08/02/2016    PROTIME 11.1 10/10/2017    PROTIME 12.0 08/02/2016 Pro-BNP   Lab Results   Component Value Date/Time    PROBNP 154 2021 07:57 PM    PROBNP 101 10/18/2019 12:06 PM    PROBNP 82 2018 10:32 AM     LIPIDS:  No components found for: [facility]  Lab Results   Component Value Date    TRIG 216 (H) 2021    TRIG 175 (H) 2020    TRIG 133 2018     Lab Results   Component Value Date    HDL 45 06/10/2022    HDL 34 (L) 2021    HDL 41 2020     Lab Results   Component Value Date    LDLCALC 48 06/10/2022    LDLCALC 81 2021    LDLCALC 104 (H) 2020     Lab Results   Component Value Date    LABVLDL 9 06/10/2022    LABVLDL 43 2021    LABVLDL 35 2020     TSH:  Lab Results   Component Value Date    TSH 0.41 2020       [id number] Cardiac MRI     Findings are consistent with a mixed myopathy with evidence of prior subendocardial infarction involving the lateral/inferolateral myocardium with severely reduced LV systolic function. No myocardial inflammation noted. -Dilated left ventricle with severely reduced systolic function with a calculated ejection fraction of 28% by Moses's method.   -Normal right ventricular size with mildly reduced systolic function with a calculated ejection fraction of 45% by Moses's method.   -No myocardial edema by T2w imaging/mapping. (Normal myocardium/skeletal ratio - normal < 1.9). -No significant intracardiac shunt. Calculated Qp:Qs: 1.05 (Phase contrast velocity encoding Ao/Pa)   -Normal myocardial resting perfusion imaging.   -On delayed enhancement imaging, there is presence of non transmural subendocardial enhancement involving the basal/mid inferolateral myocardium consistent with prior myocardial infarction. 2021 [facility]  LEFT MAIN:  Left main is free of significant obstructive disease. LEFT ANTERIOR DESCENDING:  The LAD courses to, but does not achieve the  apex. It gives off three diagonal branches. They are moderate in  distribution.   There is moderate calcification throughout the LAD and  mild diffuse disease. The first diagonal branch is a moderate  distribution vessel and has a long area of tubular stenosis approaching  75% throughout this area. Distal vessels are free of significant  obstructive disease. LEFT CIRCUMFLEX:  Circumflex consists of a high rising marginal versus  intermediate ramus branch, which is relatively a small in distribution. He has a mild diffuse disease, but no significant focal obstructive  lesions. LEFT CIRCUMFLEX:  Circumflex consists essentially of a single marginal  branch with multiple terminal divisions. It then courses around the AV  groove. There is mild diffuse, but no significant focal obstructive  lesions. RIGHT CORONARY ARTERY:  Right coronary artery is a large dominant  vessel. It is totally occluded in its mid portion. There are extensive  collaterals filling the entire PDA, distribution along the entire  inferior wall, and wraps the inferior apex. IMPRESSION:  1. Cardiomyopathy. 2.  Essentially single-vessel coronary artery disease consisting of a  totally occluded but collateralized right coronary artery and branch  vessel disease consisting of diagonal branch disease. 3.  Successful Angio-Seal of the right femoral arteriotomy site    6/28/2021 Nuc stress      There is a moderate size, moderate intensity, mid to distal    inferior/inferolateral wall and apical wall mixed (but primarily    reversible)defect, consistent with ischemia. Severely dilated LV with moderately depressed LV systolic function. Left ventricular ejection fraction of 38 %. There is moderate diffuse hypokinesis, apex is akinetic. Overall findings represent a high risk scan. Dr Tabatha Becerril was notified. 2019 calcium score: 0    2019 Echo   Technically difficult examination. Left ventricular cavity size is normal. Normal left ventricular wall   thickness.    Overall left ventricular systolic function appears reduced with an ejection   fraction of 30%. There is diffuse hypokinesis. Normal diastolic function. Trivial pulmonic regurgitation present. The aortic root is borderline dilated measuring 3.8cm. Medications:   Current Outpatient Medications   Medication Sig Dispense Refill    carvedilol (COREG) 12.5 MG tablet Take 1 tablet by mouth in the morning and 1 tablet before bedtime. 180 tablet 3    spironolactone (ALDACTONE) 25 MG tablet Take 0.5 tablets by mouth in the morning. 45 tablet 3    nitroGLYCERIN (NITROSTAT) 0.4 MG SL tablet up to max of 3 total doses. If no relief after 1 dose, call 911. 25 tablet 3    aspirin EC 81 MG EC tablet Take 1 tablet by mouth daily 90 tablet 3    rosuvastatin (CRESTOR) 20 MG tablet Take 1 tablet by mouth daily 90 tablet 3    empagliflozin (JARDIANCE) 10 MG tablet Take 1 tablet by mouth daily 90 tablet 3    allopurinol (ZYLOPRIM) 100 MG tablet TAKE 1 TABLET BY MOUTH EVERY DAY 90 tablet 1    losartan (COZAAR) 50 MG tablet TAKE 1 TABLET BY MOUTH TWICE A  tablet 1    furosemide (LASIX) 20 MG tablet TAKE 1 TABLET BY MOUTH EVERY DAY 90 tablet 3    clobetasol (TEMOVATE) 0.05 % ointment APPLY EXTERNALLY TO THE AFFECTED AREA TWICE DAILY 60 g 2    magnesium 30 MG tablet Take 400 mg by mouth daily       Zinc Sulfate (ZINC 15 PO) Take by mouth      vitamin E 400 UNIT capsule Take 400 Units by mouth daily      Coenzyme Q10 (COQ10) 100 MG CAPS Take 4 tablets by mouth       Glucosamine-Chondroit-Vit C-Mn (GLUCOSAMINE 1500 COMPLEX PO) Take by mouth      Omega-3 Fatty Acids (FISH OIL) 1200 MG CAPS Take 2 capsules by mouth 3 tablets once a week       B Complex Vitamins (VITAMIN-B COMPLEX) TABS Take by mouth      vitamin D (CHOLECALCIFEROL) 400 UNIT TABS tablet Take 400 Units by mouth 2 times daily. No current facility-administered medications for this visit. Assessment:  1. Chronic systolic heart failure ([address]    2.  Coronary artery disease involving native coronary artery of native heart without angina pectoris    3. Mixed hyperlipidemia    4. Hypertension, benign        Plan:  Chronic HFrEF: stable    Over 30 minutes spent with patient discussing plan of care, disease process and medications.     Discussed s/s decompensated HF   Discussed daily weights, low salt diet and compression stockings   Switch losartan to Entresto 24/26 mg po bid   Stop lasix    Coreg 12.5 mg po bid   Spironolactone 12.5 mg daily   Jardiance 10 mgpo daily   Consult EP for ICD    CAD: stable    Coreg   Entresto   ASA   Crestor  HTN: stable    /72   Coreg   Entresto   HLD: stable    Crestor      Follow up in 1 month     Reviewed most recent: CBC, BMP, LFT, Lipids, Mag, PT/INR, BNP, TSH  Reviewed most recent: ECG, Echo, Nuc stress test,  Calcium score, LHC

## 2022-09-01 DIAGNOSIS — M1A.0720 IDIOPATHIC CHRONIC GOUT OF LEFT FOOT WITHOUT TOPHUS: ICD-10-CM

## 2022-09-02 RX ORDER — NITROGLYCERIN 0.4 MG/1
TABLET SUBLINGUAL
Qty: 25 TABLET | Refills: 3 | Status: SHIPPED | OUTPATIENT
Start: 2022-09-02

## 2022-09-02 RX ORDER — CARVEDILOL 6.25 MG/1
TABLET ORAL
Qty: 180 TABLET | Refills: 1 | OUTPATIENT
Start: 2022-09-02

## 2022-09-02 NOTE — TELEPHONE ENCOUNTER
Requested Prescriptions     Pending Prescriptions Disp Refills    nitroGLYCERIN (NITROSTAT) 0.4 MG SL tablet 25 tablet 3     Sig: up to max of 3 total doses. If no relief after 1 dose, call 911.             Last Office Visit: 8/26/2022     Next Office Visit: 9/20/2022

## 2022-09-05 RX ORDER — ALLOPURINOL 100 MG/1
TABLET ORAL
Qty: 90 TABLET | Refills: 1 | Status: SHIPPED | OUTPATIENT
Start: 2022-09-05

## 2022-09-06 DIAGNOSIS — I50.22 CHRONIC SYSTOLIC (CONGESTIVE) HEART FAILURE (HCC): ICD-10-CM

## 2022-09-06 DIAGNOSIS — I10 ESSENTIAL (PRIMARY) HYPERTENSION: ICD-10-CM

## 2022-09-06 RX ORDER — FUROSEMIDE 20 MG/1
20 TABLET ORAL DAILY
Qty: 90 TABLET | Refills: 3
Start: 2022-09-06

## 2022-09-06 RX ORDER — LOSARTAN POTASSIUM 50 MG/1
50 TABLET ORAL DAILY
Qty: 90 TABLET | Refills: 3
Start: 2022-09-06

## 2022-09-14 NOTE — H&P (VIEW-ONLY)
[facility]   Cardiac Electrophysiology Consultation   Date: 9/20/2022  Reason for Consultation:   Consult Requesting Physician: No att. providers found     Chief Complaint:   Chief Complaint   Patient presents with    New Patient     Discuss ICD per GB         HPI: Kapil Walden is a 59 y.o. male referred by Dr. Aubrey Campos for [address]. PMH significant for HTN, HLD, untreated VERN, CAD/ MI, HFrEF, CMP with EF 20% (2016 with [facility] at that time showing minimal CAD), cardiac MRI (2/2019, [facility]) showed no scar and EF 40%, s/p LHC (6/2021) showed single vessel disease but collateralized RCA, cardiac MRI (7/2022, [facility]) showed EF 28% with LGE consistent with scar. He has been on optimal GDMT. Interval History: Today, he is here for consideration of ICD for primary prevention. He is very frustrated with all of the medications he is on and the side effects which cause him to take other meds to deal with the side effects. Assessment:  Mixed CMP / HFrEF, on Coreg, losartan, spironolactone, Jardiance  CAD, on ASA, Coreg, statin  HTN, stable on current meds  HLD, on statin  VERN, untreated    Plan:  Schedule for implantation of single chamber ICD  Follow up in 1 week with device tech for wound and device check  Follow up in 1 month with EP NP with device check  Continue to f/u with Dr. Aubrey Campos    Chronic systolic congestive heart failure with LV ejection fraction 28%  On maximally tolerated guideline directed medical therapy for more than 3 months. No recent revascularization within the past 3 months  No recent MI within the past 40 days  Narrow QRS complex. NYHA class 2    Hence he would qualify for ICD implantation for primary prevention of sudden cardiac arrest. (MADIT II, SCD HeFT and DEFINITE trials)    I had a detailed shared decision making interaction with the patient and he is willing to proceed with ICD implantation.      CONSENT:  The indications, risks, benefits and alternatives to ICD implantation have been reviewed in detail with the patient. Procedure will be performed with moderate sedation. The risks include perforation, bleeding, pneumothorax, pocket pain or hematoma, pocket or system infection, lead dislodgement, malfunctioning device and complications associated with defibrillation testing. I have used 3D anatomic model of the heart to explain in detail. Lay terms were used and patient's questions were answered in detail. The patient was specifically told that this device is meant to decrease the risk of sudden death and will not decrease the risk of myocardial infarction or symptomatic heart failure. Post-procedure wound care and restrictions were also discussed. A detailed instruction sheet will be given to the patient and family after procedure and before discharge from the hospital. Regular post-procedure follow-up in the device clinic was emphasized. The patient was told that getting one or multiple shocks is likely during the life of the device but the timing or the number of shocks cannot be predicted. The patient verbalized understanding and wishes to proceed with the procedure. Past Medical History:   Diagnosis Date    Arthritis     CHF (congestive heart failure) (HCC)     CHF (congestive heart failure) (HCC)     Hypertension, benign     Kidney stone     Lipoma     removal righy shoulder    Other drug allergy(995.27)     Psoriasis     pt states it is worse since being on Metoprolol    Thyroid disease     enlarged    Unspecified sleep apnea     no CPAP. Has not done a sleep study        Past Surgical History:   Procedure Laterality Date    HERNIA REPAIR      right     JOINT REPLACEMENT  10/27/2017    left shoulder    LIPOMA RESECTION      OTHER SURGICAL HISTORY Left 10/27/2017    LEFT SHOULDER RESURFACTING HEMIARTHROPLASTY, OPEN BICEPS    TONSILLECTOMY         Allergies:   Allergies   Allergen Reactions    Doxycycline Dermatitis    Hydrocodone Other (See Comments)     States that skin peels off    Iodides     Ketorolac Tromethamine Swelling    Naproxen Other (See Comments)     unknown    Norco [Hydrocodone-Acetaminophen] Dermatitis    Oxycodone-Acetaminophen Hives    Percocet [Oxycodone-Acetaminophen]      Skin falls off hands    Clindamycin/Lincomycin Rash     \"sunburn rash\" and impending feeling of gloom       Medication:   Prior to Admission medications    Medication Sig Start Date End Date Taking? Authorizing Provider   furosemide (LASIX) 20 MG tablet Take 1 tablet by mouth daily 9/6/22  Yes Enoc Ran, APRN - CNP   losartan (COZAAR) 50 MG tablet Take 1 tablet by mouth daily 9/6/22  Yes Enoc Ran, APRN - CNP   allopurinol (ZYLOPRIM) 100 MG tablet TAKE 1 TABLET BY MOUTH EVERY DAY 9/5/22  Yes Summer Albarado MD   nitroGLYCERIN (NITROSTAT) 0.4 MG SL tablet up to max of 3 total doses. If no relief after 1 dose, call 911. 9/2/22  Yes Enoc Baptiste APRN - CNP   carvedilol (COREG) 12.5 MG tablet Take 1 tablet by mouth in the morning and 1 tablet before bedtime.  7/29/22  Yes Prabhjot Carrillo MD   spironolactone (ALDACTONE) 25 MG tablet Take 0.5 tablets by mouth in the morning. 7/29/22  Yes Prabhjot Carrillo MD   aspirin EC 81 MG EC tablet Take 1 tablet by mouth daily 5/27/22  Yes Prabhjot Carrillo MD   rosuvastatin (CRESTOR) 20 MG tablet Take 1 tablet by mouth daily 5/27/22  Yes Prabhjot Carrillo MD   empagliflozin (JARDIANCE) 10 MG tablet Take 1 tablet by mouth daily 5/27/22  Yes Prabhjot Carrillo MD   clobetasol (TEMOVATE) 0.05 % ointment APPLY EXTERNALLY TO THE AFFECTED AREA TWICE DAILY 5/18/21  Yes Summer Albarado MD   magnesium 30 MG tablet Take 400 mg by mouth daily    Yes Historical Provider, MD   Zinc Sulfate (ZINC 15 PO) Take by mouth   Yes Historical Provider, MD   vitamin E 400 UNIT capsule Take 400 Units by mouth daily   Yes Historical Provider, MD   Coenzyme Q10 (COQ10) 100 MG CAPS Take 4 tablets by mouth    Yes Historical Provider, MD   Glucosamine-Chondroit-Vit C-Mn (GLUCOSAMINE 1500 COMPLEX PO) Take by mouth   Yes Historical Provider, MD   Omega-3 Fatty Acids (FISH OIL) 1200 MG CAPS Take 2 capsules by mouth 3 tablets once a week    Yes Historical Provider, MD   B Complex Vitamins (VITAMIN-B COMPLEX) TABS Take by mouth   Yes Historical Provider, MD   vitamin D (CHOLECALCIFEROL) 400 UNIT TABS tablet Take 400 Units by mouth 2 times daily. 8/18/10  Yes Historical Provider, MD       Social History:   reports that he has never smoked. He has never used smokeless tobacco. He reports current alcohol use. He reports that he does not use drugs. Family History:  family history includes Other in his father and mother. Reviewed. Denies family history of sudden cardiac death, arrhythmia, premature CAD    Review of System:    General ROS: negative for - chills, fever   Psychological ROS: negative for - anxiety or depression  Ophthalmic ROS: negative for - eye pain or loss of vision  ENT ROS: negative for - epistaxis, headaches, nasal discharge, sore throat   Allergy and Immunology ROS: negative for - hives, nasal congestion   Hematological and Lymphatic ROS: negative for - bleeding problems, blood clots, bruising or jaundice  Endocrine ROS: negative for - skin changes, temperature intolerance or unexpected weight changes  Respiratory ROS: negative for - cough, hemoptysis, pleuritic pain, SOB, sputum changes or wheezing  Cardiovascular ROS: Per HPI.    Gastrointestinal ROS: negative for - abdominal pain, blood in stools, diarrhea, hematemesis, melena, nausea/vomiting or swallowing difficulty/pain  Genito-Urinary ROS: negative for - dysuria or incontinence  Musculoskeletal ROS: negative for - joint swelling or muscle pain  Neurological ROS: negative for - confusion, dizziness, gait disturbance, headaches, numbness/tingling, seizures, speech problems, tremors, visual changes or weakness  Dermatological ROS: negative for - rash    Physical Examination:  Vitals:    09/20/22 1512   BP: 118/70   Pulse: 80 Constitutional: Oriented. No distress. Head: Normocephalic and atraumatic. Mouth/Throat: Oropharynx is clear and moist.   Eyes: Conjunctivae normal. EOM are normal.   Neck: Normal range of motion. Neck supple. No rigidity. No JVD present. Cardiovascular: Normal rate, regular rhythm, S1&S2 and intact distal pulses. Pulmonary/Chest: Bilateral respiratory sounds. No wheezes. No rhonchi. Abdominal: Soft. Bowel sounds present. No distension, No tenderness. Musculoskeletal: No tenderness. No edema    Lymphadenopathy: Has no cervical adenopathy. Neurological: Alert and oriented. Cranial nerve appears intact, No Gross deficit   Skin: Skin is warm and dry. No rash noted. Psychiatric: Has a normal mood, affect and behavior     Labs:  Reviewed. ECG: reviewed, Sinus  Rhythm, with QRS duration 98 ms. No pathologic Q waves, ventricular pre-excitation, or QT prolongation. Studies:   Echo 1/8/19  Summary   Technically difficult examination. Left ventricular cavity size is normal. Normal left ventricular wall   thickness. Overall left ventricular systolic function appears reduced with an ejection   fraction of 30%. There is diffuse hypokinesis. Normal diastolic function. Trivial pulmonic regurgitation present. The aortic root is borderline dilated measuring 3.8cm    2. Cardiac MRI 7/26/22 [facility]):  CONCLUSIONS       Findings are consistent with a mixed myopathy with evidence of prior subendocardial infarction involving the lateral/inferolateral myocardium with severely reduced LV systolic function. No myocardial inflammation noted. -Dilated left ventricle with severely reduced systolic function with a calculated ejection fraction of 28% by Moses's method.   -Normal right ventricular size with mildly reduced systolic function with a calculated ejection fraction of 45% by Moses's method.   -No myocardial edema by T2w imaging/mapping. (Normal myocardium/skeletal ratio - normal < 1.9). -No significant intracardiac shunt. Calculated Qp:Qs: 1.05 (Phase contrast velocity encoding Ao/Pa)   -Normal myocardial resting perfusion imaging.   -On delayed enhancement imaging, there is presence of non transmural subendocardial enhancement involving the basal/mid inferolateral myocardium consistent with prior myocardial infarction. Cardiac MRI 2/14/19 [facility]):  IMPRESSION:   1. Dilated left ventricle with mildly reduced systolic function. Calculated left ventricular ejection fraction was 40%, left ventricular end diastolic volume was 123 ml (117 ml/m2) , left ventricular mass was 232 g ( 100 g/m2) c/w left ventricular hypertrophy. . Global hypokinesis. No late gadolinium enhancement . Normal ECV of 27% and normal  T2 of 51 ms. Findings c/w non ischemic cardiomyopathy without evidence of an active inflammatory process   2. Normal right ventricular size and function without segmental wall motion abnormalities. 3.  No significant valvular abnormalities. 4.  Normal pericardium and great vessels. 3. Stress Test 6/2021:    Summary    There is a moderate size, moderate intensity, mid to distal    inferior/inferolateral wall and apical wall mixed (but primarily    reversible)defect, consistent with ischemia. Severely dilated LV with moderately depressed LV systolic function. Left ventricular ejection fraction of 38 %. There is moderate diffuse hypokinesis, apex is akinetic. Overall findings represent a high risk scan. Dr Juan J Nolasco was notified. 4. Cath 6/2021 (Dr. Rivas Clemente):   IMPRESSION:  1. Cardiomyopathy. 2.  Essentially single-vessel coronary artery disease consisting of a  totally occluded but collateralized right coronary artery and branch  vessel disease consisting of diagonal branch disease. 3.  Successful Angio-Seal of the right femoral arteriotomy site. The MCOT, echocardiogram, stress test, and coronary angiography/PCI were reviewed by myself and used for my plan of care.      - The patient is counseled to follow a low salt diet to assure blood pressure remains controlled for cardiovascular risk factor modification.   - The patient is counseled to avoid excess caffeine, and energy drinks as this may exacerbated ectopy and arrhythmia. - The patient is counseled to get regular exercise 3-5 times per week to control cardiovascular risk factors. - The patient is counseled to lose weigt to control cardiovascular risk factors. -The patient is counseled about the health hazards of smoking including cardiovascular side effects and its impact on morbidity and mortality. Thank you for allowing me to participate in the care of Lynda Sandhu. All questions and concerns were addressed to the patient/family. Alternatives to my treatment were discussed. Samantha Carlin RN, am scribing for and in the presence of Dr. Sherlyn Martini. 09/20/22 3:29 PM  Jessica Lopez RN    I, Cherise Arnett MD, personally performed the services prescribed in this documentation as scribed by Ms. Jessica Lopez RN in my presence and it is both accurate and complete.      Cherise Arnett MD  Cardiac Electrophysiology  [facility]

## 2022-09-14 NOTE — PROGRESS NOTES
[address]   Cardiac Electrophysiology Consultation   Date: 9/20/2022  Reason for Consultation:   Consult Requesting Physician: No att. providers found     Chief Complaint:   Chief Complaint   Patient presents with    New Patient     Discuss ICD per GB         HPI: Claudia Rivero is a 59 y.o. male referred by Dr. Iam Cohen for [address]. PMH significant for HTN, HLD, untreated VERN, CAD/ MI, HFrEF, CMP with EF 20% (2016 with [facility] at that time showing minimal CAD), cardiac MRI (2/2019, [facility]) showed no scar and EF 40%, s/p LHC (6/2021) showed single vessel disease but collateralized RCA, cardiac MRI (7/2022, [facility]) showed EF 28% with LGE consistent with scar. He has been on optimal GDMT. Interval History: Today, he is here for consideration of ICD for primary prevention. He is very frustrated with all of the medications he is on and the side effects which cause him to take other meds to deal with the side effects. Assessment:  Mixed CMP / HFrEF, on Coreg, losartan, spironolactone, Jardiance  CAD, on ASA, Coreg, statin  HTN, stable on current meds  HLD, on statin  VERN, untreated    Plan:  Schedule for implantation of single chamber ICD  Follow up in 1 week with device tech for wound and device check  Follow up in 1 month with EP NP with device check  Continue to f/u with Dr. Iam Cohen    Chronic systolic congestive heart failure with LV ejection fraction 28%  On maximally tolerated guideline directed medical therapy for more than 3 months. No recent revascularization within the past 3 months  No recent MI within the past 40 days  Narrow QRS complex. NYHA class 2    Hence he would qualify for ICD implantation for primary prevention of sudden cardiac arrest. (MADIT II, SCD HeFT and DEFINITE trials)    I had a detailed shared decision making interaction with the patient and he is willing to proceed with ICD implantation.      CONSENT:  The indications, risks, benefits and alternatives to ICD implantation have been reviewed in detail with the patient. Procedure will be performed with moderate sedation. The risks include perforation, bleeding, pneumothorax, pocket pain or hematoma, pocket or system infection, lead dislodgement, malfunctioning device and complications associated with defibrillation testing. I have used 3D anatomic model of the heart to explain in detail. Lay terms were used and patient's questions were answered in detail. The patient was specifically told that this device is meant to decrease the risk of sudden death and will not decrease the risk of myocardial infarction or symptomatic heart failure. Post-procedure wound care and restrictions were also discussed. A detailed instruction sheet will be given to the patient and family after procedure and before discharge from the hospital. Regular post-procedure follow-up in the device clinic was emphasized. The patient was told that getting one or multiple shocks is likely during the life of the device but the timing or the number of shocks cannot be predicted. The patient verbalized understanding and wishes to proceed with the procedure. Past Medical History:   Diagnosis Date    Arthritis     CHF (congestive heart failure) (HCC)     CHF (congestive heart failure) (HCC)     Hypertension, benign     Kidney stone     Lipoma     removal righy shoulder    Other drug allergy(995.27)     Psoriasis     pt states it is worse since being on Metoprolol    Thyroid disease     enlarged    Unspecified sleep apnea     no CPAP. Has not done a sleep study        Past Surgical History:   Procedure Laterality Date    HERNIA REPAIR      right     JOINT REPLACEMENT  10/27/2017    left shoulder    LIPOMA RESECTION      OTHER SURGICAL HISTORY Left 10/27/2017    LEFT SHOULDER RESURFACTING HEMIARTHROPLASTY, OPEN BICEPS    TONSILLECTOMY         Allergies:   Allergies   Allergen Reactions    Doxycycline Dermatitis    Hydrocodone Other (See Comments)     States that skin peels off    Iodides     Ketorolac Tromethamine Swelling    Naproxen Other (See Comments)     unknown    Norco [Hydrocodone-Acetaminophen] Dermatitis    Oxycodone-Acetaminophen Hives    Percocet [Oxycodone-Acetaminophen]      Skin falls off hands    Clindamycin/Lincomycin Rash     \"sunburn rash\" and impending feeling of gloom       Medication:   Prior to Admission medications    Medication Sig Start Date End Date Taking? Authorizing Provider   furosemide (LASIX) 20 MG tablet Take 1 tablet by mouth daily 9/6/22  Yes MYKEL Shaw CNP   losartan (COZAAR) 50 MG tablet Take 1 tablet by mouth daily 9/6/22  Yes MYKEL Shaw CNP   allopurinol (ZYLOPRIM) 100 MG tablet TAKE 1 TABLET BY MOUTH EVERY DAY 9/5/22  Yes Jarek Varner MD   nitroGLYCERIN (NITROSTAT) 0.4 MG SL tablet up to max of 3 total doses. If no relief after 1 dose, call 911. 9/2/22  Yes MYKEL Shaw CNP   carvedilol (COREG) 12.5 MG tablet Take 1 tablet by mouth in the morning and 1 tablet before bedtime.  7/29/22  Yes Rut Garcia MD   spironolactone (ALDACTONE) 25 MG tablet Take 0.5 tablets by mouth in the morning. 7/29/22  Yes Rut Garcia MD   aspirin EC 81 MG EC tablet Take 1 tablet by mouth daily 5/27/22  Yes Rut Garcia MD   rosuvastatin (CRESTOR) 20 MG tablet Take 1 tablet by mouth daily 5/27/22  Yes Rut Garcia MD   empagliflozin (JARDIANCE) 10 MG tablet Take 1 tablet by mouth daily 5/27/22  Yes Rut Garcia MD   clobetasol (TEMOVATE) 0.05 % ointment APPLY EXTERNALLY TO THE AFFECTED AREA TWICE DAILY 5/18/21  Yes Jarek Varner MD   magnesium 30 MG tablet Take 400 mg by mouth daily    Yes Historical Provider, MD   Zinc Sulfate (ZINC 15 PO) Take by mouth   Yes Historical Provider, MD   vitamin E 400 UNIT capsule Take 400 Units by mouth daily   Yes Historical Provider, MD   Coenzyme Q10 (COQ10) 100 MG CAPS Take 4 tablets by mouth    Yes Historical Provider, MD   Glucosamine-Chondroit-Vit C-Mn (GLUCOSAMINE 1500 COMPLEX PO) Take by mouth   Yes Historical Provider, MD   Omega-3 Fatty Acids (FISH OIL) 1200 MG CAPS Take 2 capsules by mouth 3 tablets once a week    Yes Historical Provider, MD   B Complex Vitamins (VITAMIN-B COMPLEX) TABS Take by mouth   Yes Historical Provider, MD   vitamin D (CHOLECALCIFEROL) 400 UNIT TABS tablet Take 400 Units by mouth 2 times daily. 8/18/10  Yes Historical Provider, MD       Social History:   reports that he has never smoked. He has never used smokeless tobacco. He reports current alcohol use. He reports that he does not use drugs. Family History:  family history includes Other in his father and mother. Reviewed. Denies family history of sudden cardiac death, arrhythmia, premature CAD    Review of System:    General ROS: negative for - chills, fever   Psychological ROS: negative for - anxiety or depression  Ophthalmic ROS: negative for - eye pain or loss of vision  ENT ROS: negative for - epistaxis, headaches, nasal discharge, sore throat   Allergy and Immunology ROS: negative for - hives, nasal congestion   Hematological and Lymphatic ROS: negative for - bleeding problems, blood clots, bruising or jaundice  Endocrine ROS: negative for - skin changes, temperature intolerance or unexpected weight changes  Respiratory ROS: negative for - cough, hemoptysis, pleuritic pain, SOB, sputum changes or wheezing  Cardiovascular ROS: Per HPI.    Gastrointestinal ROS: negative for - abdominal pain, blood in stools, diarrhea, hematemesis, melena, nausea/vomiting or swallowing difficulty/pain  Genito-Urinary ROS: negative for - dysuria or incontinence  Musculoskeletal ROS: negative for - joint swelling or muscle pain  Neurological ROS: negative for - confusion, dizziness, gait disturbance, headaches, numbness/tingling, seizures, speech problems, tremors, visual changes or weakness  Dermatological ROS: negative for - rash    Physical Examination:  Vitals:    09/20/22 1512   BP: 118/70   Pulse: 80 Constitutional: Oriented. No distress. Head: Normocephalic and atraumatic. Mouth/Throat: Oropharynx is clear and moist.   Eyes: Conjunctivae normal. EOM are normal.   Neck: Normal range of motion. Neck supple. No rigidity. No JVD present. Cardiovascular: Normal rate, regular rhythm, S1&S2 and intact distal pulses. Pulmonary/Chest: Bilateral respiratory sounds. No wheezes. No rhonchi. Abdominal: Soft. Bowel sounds present. No distension, No tenderness. Musculoskeletal: No tenderness. No edema    Lymphadenopathy: Has no cervical adenopathy. Neurological: Alert and oriented. Cranial nerve appears intact, No Gross deficit   Skin: Skin is warm and dry. No rash noted. Psychiatric: Has a normal mood, affect and behavior     Labs:  Reviewed. ECG: reviewed, Sinus  Rhythm, with QRS duration 98 ms. No pathologic Q waves, ventricular pre-excitation, or QT prolongation. Studies:   Echo 1/8/19  Summary   Technically difficult examination. Left ventricular cavity size is normal. Normal left ventricular wall   thickness. Overall left ventricular systolic function appears reduced with an ejection   fraction of 30%. There is diffuse hypokinesis. Normal diastolic function. Trivial pulmonic regurgitation present. The aortic root is borderline dilated measuring 3.8cm    2. Cardiac MRI 7/26/22 [facility]):  CONCLUSIONS       Findings are consistent with a mixed myopathy with evidence of prior subendocardial infarction involving the lateral/inferolateral myocardium with severely reduced LV systolic function. No myocardial inflammation noted. -Dilated left ventricle with severely reduced systolic function with a calculated ejection fraction of 28% by Moses's method.   -Normal right ventricular size with mildly reduced systolic function with a calculated ejection fraction of 45% by Moses's method.   -No myocardial edema by T2w imaging/mapping. (Normal myocardium/skeletal ratio - normal < 1.9). The patient is counseled to follow a low salt diet to assure blood pressure remains controlled for cardiovascular risk factor modification.   - The patient is counseled to avoid excess caffeine, and energy drinks as this may exacerbated ectopy and arrhythmia. - The patient is counseled to get regular exercise 3-5 times per week to control cardiovascular risk factors. - The patient is counseled to lose weigt to control cardiovascular risk factors. -The patient is counseled about the health hazards of smoking including cardiovascular side effects and its impact on morbidity and mortality. Thank you for allowing me to participate in the care of Kevin Brantley. All questions and concerns were addressed to the patient/family. Alternatives to my treatment were discussed. Juli Louise RN, am scribing for and in the presence of Dr. Danyel Gutierrez. 09/20/22 3:29 PM  Blanche Choi RN    I, Ric Pruitt MD, personally performed the services prescribed in this documentation as scribed by Ms. Blanche Choi RN in my presence and it is both accurate and complete.      Ric Pruitt MD  Cardiac Electrophysiology  [place] [id number]

## 2022-09-15 DIAGNOSIS — I50.22 CHRONIC SYSTOLIC (CONGESTIVE) HEART FAILURE (HCC): Primary | ICD-10-CM

## 2022-09-15 DIAGNOSIS — E78.2 MIXED HYPERLIPIDEMIA: ICD-10-CM

## 2022-09-15 DIAGNOSIS — I10 ESSENTIAL (PRIMARY) HYPERTENSION: ICD-10-CM

## 2022-09-15 DIAGNOSIS — I25.10 CORONARY ARTERY DISEASE INVOLVING NATIVE CORONARY ARTERY OF NATIVE HEART WITHOUT ANGINA PECTORIS: ICD-10-CM

## 2022-09-16 DIAGNOSIS — I10 ESSENTIAL (PRIMARY) HYPERTENSION: ICD-10-CM

## 2022-09-16 DIAGNOSIS — I50.22 CHRONIC SYSTOLIC (CONGESTIVE) HEART FAILURE (HCC): ICD-10-CM

## 2022-09-16 DIAGNOSIS — E78.2 MIXED HYPERLIPIDEMIA: ICD-10-CM

## 2022-09-16 DIAGNOSIS — I25.10 CORONARY ARTERY DISEASE INVOLVING NATIVE CORONARY ARTERY OF NATIVE HEART WITHOUT ANGINA PECTORIS: ICD-10-CM

## 2022-09-16 LAB
A/G RATIO: 1.6 (ref 1.1–2.2)
ALBUMIN SERPL-MCNC: 4.3 G/DL (ref 3.4–5)
ALP BLD-CCNC: 114 U/L (ref 40–129)
ALT SERPL-CCNC: 22 U/L (ref 10–40)
ANION GAP SERPL CALCULATED.3IONS-SCNC: 15 MMOL/L (ref 3–16)
AST SERPL-CCNC: 20 U/L (ref 15–37)
BILIRUB SERPL-MCNC: 0.7 MG/DL (ref 0–1)
BILIRUBIN DIRECT: <0.2 MG/DL (ref 0–0.3)
BILIRUBIN URINE: NEGATIVE
BILIRUBIN, INDIRECT: NORMAL MG/DL (ref 0–1)
BLOOD, URINE: NEGATIVE
BUN BLDV-MCNC: 23 MG/DL (ref 7–20)
C-REACTIVE PROTEIN: 5 MG/L (ref 0–5.1)
CALCIUM SERPL-MCNC: 9.1 MG/DL (ref 8.3–10.6)
CHLORIDE BLD-SCNC: 101 MMOL/L (ref 99–110)
CHOLESTEROL, TOTAL: 146 MG/DL (ref 0–199)
CLARITY: CLEAR
CO2: 22 MMOL/L (ref 21–32)
COLOR: ABNORMAL
CREAT SERPL-MCNC: 1 MG/DL (ref 0.8–1.3)
GFR AFRICAN AMERICAN: >60
GFR NON-AFRICAN AMERICAN: >60
GLUCOSE BLD-MCNC: 165 MG/DL (ref 70–99)
GLUCOSE URINE: NEGATIVE MG/DL
HDLC SERPL-MCNC: 41 MG/DL (ref 40–60)
KETONES, URINE: NEGATIVE MG/DL
LDL CHOLESTEROL CALCULATED: 86 MG/DL
LEUKOCYTE ESTERASE, URINE: NEGATIVE
MICROSCOPIC EXAMINATION: ABNORMAL
NITRITE, URINE: NEGATIVE
PH UA: 5.5 (ref 5–8)
POTASSIUM SERPL-SCNC: 4.6 MMOL/L (ref 3.5–5.1)
PROTEIN UA: NEGATIVE MG/DL
SODIUM BLD-SCNC: 138 MMOL/L (ref 136–145)
SPECIFIC GRAVITY UA: 1.02 (ref 1–1.03)
TOTAL PROTEIN: 7 G/DL (ref 6.4–8.2)
TRIGL SERPL-MCNC: 97 MG/DL (ref 0–150)
TROPONIN: <0.01 NG/ML
URIC ACID, SERUM: 5 MG/DL (ref 3.5–7.2)
URINE TYPE: ABNORMAL
UROBILINOGEN, URINE: 0.2 E.U./DL
VITAMIN D 25-HYDROXY: 77.6 NG/ML
VLDLC SERPL CALC-MCNC: 19 MG/DL

## 2022-09-16 PROCEDURE — 81003 URINALYSIS AUTO W/O SCOPE: CPT | Performed by: NURSE PRACTITIONER

## 2022-09-17 LAB
ESTIMATED AVERAGE GLUCOSE: 154.2 MG/DL
HBA1C MFR BLD: 7 %

## 2022-09-20 ENCOUNTER — OFFICE VISIT (OUTPATIENT)
Dept: CARDIOLOGY CLINIC | Age: 62
End: 2022-09-20
Payer: COMMERCIAL

## 2022-09-20 ENCOUNTER — TELEPHONE (OUTPATIENT)
Dept: CARDIOLOGY CLINIC | Age: 62
End: 2022-09-20

## 2022-09-20 VITALS
DIASTOLIC BLOOD PRESSURE: 70 MMHG | SYSTOLIC BLOOD PRESSURE: 118 MMHG | HEART RATE: 80 BPM | BODY MASS INDEX: 34.36 KG/M2 | WEIGHT: 226 LBS

## 2022-09-20 DIAGNOSIS — I10 ESSENTIAL (PRIMARY) HYPERTENSION: ICD-10-CM

## 2022-09-20 DIAGNOSIS — E78.2 MIXED HYPERLIPIDEMIA: ICD-10-CM

## 2022-09-20 DIAGNOSIS — I25.10 CORONARY ARTERY DISEASE INVOLVING NATIVE CORONARY ARTERY OF NATIVE HEART WITHOUT ANGINA PECTORIS: ICD-10-CM

## 2022-09-20 DIAGNOSIS — I42.8 OTHER CARDIOMYOPATHY (HCC): Primary | ICD-10-CM

## 2022-09-20 DIAGNOSIS — I50.22 CHRONIC SYSTOLIC (CONGESTIVE) HEART FAILURE (HCC): ICD-10-CM

## 2022-09-20 PROCEDURE — 93000 ELECTROCARDIOGRAM COMPLETE: CPT | Performed by: INTERNAL MEDICINE

## 2022-09-20 PROCEDURE — 99205 OFFICE O/P NEW HI 60 MIN: CPT | Performed by: INTERNAL MEDICINE

## 2022-09-20 NOTE — LETTER
[facility]  EP Procedure Sheet  9/20/22           [id number]     Sasha Fitzgerald  1960    Physician: Dr. Sima Moise    EP Procedures   Pacemaker implant  EP Study   x ICD implant---single chamber   Atrial flutter ablation     Biv implant  Atrial fibrillation ablation    Generator Change  SVT ablation    Lead revision  VT ablation    Lead extraction +/- upgrade  AVN ablation    Loop implant  Cardioversion     Other:   JAE     Equipment  x Medtronic   REEMA Mapping System    [place]  [address] [address]  CryoAblation    Biotronik  Laser Lead Extraction    Special Equipment       EP Procedures Scheduling Request  Time Requested     Specific Day    Anesthesia xxx   CT surgery backup    Location [facility]     Pre-Procedure Labs / Imaging   PT/INR  Type & cross    CBC  Units PRBC    BMP/Mg  Units FFP    Venogram  CXR    Echo  Pulmonary CTA for Pulmonary vein mapping

## 2022-09-20 NOTE — PATIENT INSTRUCTIONS
Implantation of Implantable Cardioverter Defibrillator      Date of Procedure:     Time of Arrival:     Cardiologist performing procedure: Dr. Patti Powell at [facility] through the main entrance. Check in at the Outpatient Diagnostic desk on the 1st floor. Do not eat or drink anything after midnight the night before the procedure. You may brush your teeth and rinse the morning of the procedure. Take your routine medications the morning of the procedure. However, if you are taking diabetic medications, please HOLD on the day of the procedure (including insulin). If you take Lantus insulin, take HALF of your usual dose the night before. Do NOT stop taking aspirin. Lab work is due on ______. You do not need to be fasting for these labs. Please use Hibiclens soap (or any antibacterial soap such as Dial) to wash neck, chest, and abdomen the night before (or the morning of) the procedure. Do not apply any lotion, powder, or deodorant after you shower and the morning of the procedure. Please bring a list of your medications to the hospital with you. You must have someone available to drive you home the same (or next) day. If you are discharged the same day, you will need someone to stay with you at home the night of the procedure. If you are unable to make this appointment, please call [address] [facility], at [phone].

## 2022-09-20 NOTE — TELEPHONE ENCOUNTER
LVM asking pt to call back to schedule single chamber ICD. Per Scott Dhaliwal, his insurance plan does not require PA. Already gave the pt written instructions at [address] today. We just need to establish a date/time and go over preop instructions.

## 2022-09-22 ENCOUNTER — PREP FOR PROCEDURE (OUTPATIENT)
Dept: CARDIOLOGY CLINIC | Age: 62
End: 2022-09-22

## 2022-09-22 RX ORDER — CEFAZOLIN SODIUM 1 G/3ML
2000 INJECTION, POWDER, FOR SOLUTION INTRAMUSCULAR; INTRAVENOUS ONCE
Status: CANCELLED | OUTPATIENT
Start: 2022-10-05

## 2022-09-22 NOTE — TELEPHONE ENCOUNTER
Spoke with pt and scheduled ICD for 10/5 at 1400, arriving to [facility] at 1200. Reviewed instructions given to pt at OV earlier this week, asked he get labs done on 9/30. Pt verbalized understanding.

## 2022-10-05 ENCOUNTER — ANESTHESIA EVENT (OUTPATIENT)
Dept: CARDIAC CATH/INVASIVE PROCEDURES | Age: 62
End: 2022-10-05

## 2022-10-05 ENCOUNTER — HOSPITAL ENCOUNTER (OUTPATIENT)
Dept: CARDIAC CATH/INVASIVE PROCEDURES | Age: 62
Discharge: HOME | End: 2022-10-05
Payer: COMMERCIAL

## 2022-10-05 ENCOUNTER — ANESTHESIA (OUTPATIENT)
Dept: CARDIAC CATH/INVASIVE PROCEDURES | Age: 62
End: 2022-10-05

## 2022-10-05 ENCOUNTER — HOSPITAL ENCOUNTER (OUTPATIENT)
Dept: GENERAL RADIOLOGY | Age: 62
Discharge: HOME | End: 2022-10-05
Payer: COMMERCIAL

## 2022-10-05 ENCOUNTER — PROCEDURE VISIT (OUTPATIENT)
Dept: CARDIOLOGY CLINIC | Age: 62
End: 2022-10-05

## 2022-10-05 VITALS
BODY MASS INDEX: 35.4 KG/M2 | TEMPERATURE: 97.9 F | HEIGHT: 68 IN | SYSTOLIC BLOOD PRESSURE: 155 MMHG | DIASTOLIC BLOOD PRESSURE: 92 MMHG | RESPIRATION RATE: 18 BRPM | OXYGEN SATURATION: 98 % | HEART RATE: 69 BPM | WEIGHT: 233.6 LBS

## 2022-10-05 DIAGNOSIS — Z95.810 CARDIAC DEFIBRILLATOR IN SITU: Primary | ICD-10-CM

## 2022-10-05 DIAGNOSIS — I42.0 DILATED CARDIOMYOPATHY (HCC): ICD-10-CM

## 2022-10-05 DIAGNOSIS — I50.22 CHRONIC CLINICAL SYSTOLIC HEART FAILURE (HCC): ICD-10-CM

## 2022-10-05 LAB
ANION GAP SERPL CALCULATED.3IONS-SCNC: 10 MMOL/L (ref 3–16)
BUN BLDV-MCNC: 16 MG/DL (ref 7–20)
CALCIUM SERPL-MCNC: 9 MG/DL (ref 8.3–10.6)
CHLORIDE BLD-SCNC: 102 MMOL/L (ref 99–110)
CO2: 27 MMOL/L (ref 21–32)
CREAT SERPL-MCNC: 1.1 MG/DL (ref 0.8–1.3)
EKG ATRIAL RATE: 70 BPM
EKG DIAGNOSIS: NORMAL
EKG P AXIS: 47 DEGREES
EKG P-R INTERVAL: 158 MS
EKG Q-T INTERVAL: 406 MS
EKG QRS DURATION: 98 MS
EKG QTC CALCULATION (BAZETT): 438 MS
EKG R AXIS: 50 DEGREES
EKG T AXIS: -43 DEGREES
EKG VENTRICULAR RATE: 70 BPM
GFR AFRICAN AMERICAN: >60
GFR NON-AFRICAN AMERICAN: >60
GLUCOSE BLD-MCNC: 151 MG/DL (ref 70–99)
HCT VFR BLD CALC: 41.8 % (ref 40.5–52.5)
HEMOGLOBIN: 14.5 G/DL (ref 13.5–17.5)
INR BLD: 1.06 (ref 0.87–1.14)
MCH RBC QN AUTO: 31 PG (ref 26–34)
MCHC RBC AUTO-ENTMCNC: 34.6 G/DL (ref 31–36)
MCV RBC AUTO: 89.5 FL (ref 80–100)
PDW BLD-RTO: 13.1 % (ref 12.4–15.4)
PLATELET # BLD: 153 K/UL (ref 135–450)
PMV BLD AUTO: 7.9 FL (ref 5–10.5)
POTASSIUM SERPL-SCNC: 4.5 MMOL/L (ref 3.5–5.1)
PROTHROMBIN TIME: 13.7 SEC (ref 11.7–14.5)
RBC # BLD: 4.67 M/UL (ref 4.2–5.9)
SODIUM BLD-SCNC: 139 MMOL/L (ref 136–145)
WBC # BLD: 8.8 K/UL (ref 4–11)

## 2022-10-05 PROCEDURE — 80048 BASIC METABOLIC PNL TOTAL CA: CPT

## 2022-10-05 PROCEDURE — 85027 COMPLETE CBC AUTOMATED: CPT

## 2022-10-05 PROCEDURE — 93010 ELECTROCARDIOGRAM REPORT: CPT | Performed by: INTERNAL MEDICINE

## 2022-10-05 PROCEDURE — 2709999900 HC NON-CHARGEABLE SUPPLY

## 2022-10-05 PROCEDURE — 93005 ELECTROCARDIOGRAM TRACING: CPT | Performed by: INTERNAL MEDICINE

## 2022-10-05 PROCEDURE — 6360000002 HC RX W HCPCS: Performed by: NURSE ANESTHETIST, CERTIFIED REGISTERED

## 2022-10-05 PROCEDURE — 33249 INSJ/RPLCMT DEFIB W/LEAD(S): CPT | Performed by: INTERNAL MEDICINE

## 2022-10-05 PROCEDURE — 2500000003 HC RX 250 WO HCPCS: Performed by: NURSE ANESTHETIST, CERTIFIED REGISTERED

## 2022-10-05 PROCEDURE — 2580000003 HC RX 258: Performed by: INTERNAL MEDICINE

## 2022-10-05 PROCEDURE — C1722 AICD, SINGLE CHAMBER: HCPCS

## 2022-10-05 PROCEDURE — 33249 INSJ/RPLCMT DEFIB W/LEAD(S): CPT

## 2022-10-05 PROCEDURE — C1894 INTRO/SHEATH, NON-LASER: HCPCS

## 2022-10-05 PROCEDURE — 6360000002 HC RX W HCPCS: Performed by: INTERNAL MEDICINE

## 2022-10-05 PROCEDURE — 71045 X-RAY EXAM CHEST 1 VIEW: CPT

## 2022-10-05 PROCEDURE — C1777 LEAD, AICD, ENDO SINGLE COIL: HCPCS

## 2022-10-05 PROCEDURE — C1892 INTRO/SHEATH,FIXED,PEEL-AWAY: HCPCS

## 2022-10-05 PROCEDURE — 3700000001 HC ADD 15 MINUTES (ANESTHESIA)

## 2022-10-05 PROCEDURE — 3700000000 HC ANESTHESIA ATTENDED CARE

## 2022-10-05 PROCEDURE — 85610 PROTHROMBIN TIME: CPT

## 2022-10-05 PROCEDURE — 2580000003 HC RX 258: Performed by: NURSE ANESTHETIST, CERTIFIED REGISTERED

## 2022-10-05 PROCEDURE — 6360000002 HC RX W HCPCS

## 2022-10-05 RX ORDER — SODIUM CHLORIDE 0.9 % (FLUSH) 0.9 %
5-40 SYRINGE (ML) INJECTION PRN
Status: DISCONTINUED | OUTPATIENT
Start: 2022-10-05 | End: 2022-10-08 | Stop reason: HOSPADM

## 2022-10-05 RX ORDER — SODIUM CHLORIDE 9 MG/ML
INJECTION, SOLUTION INTRAVENOUS PRN
Status: DISCONTINUED | OUTPATIENT
Start: 2022-10-05 | End: 2022-10-08 | Stop reason: HOSPADM

## 2022-10-05 RX ORDER — SODIUM CHLORIDE, SODIUM LACTATE, POTASSIUM CHLORIDE, CALCIUM CHLORIDE 600; 310; 30; 20 MG/100ML; MG/100ML; MG/100ML; MG/100ML
INJECTION, SOLUTION INTRAVENOUS CONTINUOUS PRN
Status: DISCONTINUED | OUTPATIENT
Start: 2022-10-05 | End: 2022-10-05 | Stop reason: SDUPTHER

## 2022-10-05 RX ORDER — LIDOCAINE HYDROCHLORIDE 20 MG/ML
INJECTION, SOLUTION EPIDURAL; INFILTRATION; INTRACAUDAL; PERINEURAL PRN
Status: DISCONTINUED | OUTPATIENT
Start: 2022-10-05 | End: 2022-10-05 | Stop reason: SDUPTHER

## 2022-10-05 RX ORDER — CEFAZOLIN SODIUM 1 G/3ML
2000 INJECTION, POWDER, FOR SOLUTION INTRAMUSCULAR; INTRAVENOUS ONCE
Status: DISCONTINUED | OUTPATIENT
Start: 2022-10-05 | End: 2022-10-05 | Stop reason: SDUPTHER

## 2022-10-05 RX ORDER — SODIUM CHLORIDE 0.9 % (FLUSH) 0.9 %
5-40 SYRINGE (ML) INJECTION EVERY 12 HOURS SCHEDULED
Status: DISCONTINUED | OUTPATIENT
Start: 2022-10-05 | End: 2022-10-08 | Stop reason: HOSPADM

## 2022-10-05 RX ORDER — PROPOFOL 10 MG/ML
INJECTION, EMULSION INTRAVENOUS CONTINUOUS PRN
Status: DISCONTINUED | OUTPATIENT
Start: 2022-10-05 | End: 2022-10-05 | Stop reason: SDUPTHER

## 2022-10-05 RX ADMIN — CEFAZOLIN 2000 MG: 2 INJECTION, POWDER, FOR SOLUTION INTRAMUSCULAR; INTRAVENOUS at 16:10

## 2022-10-05 RX ADMIN — LIDOCAINE HYDROCHLORIDE 100 MG: 20 INJECTION, SOLUTION EPIDURAL; INFILTRATION; INTRACAUDAL; PERINEURAL at 16:13

## 2022-10-05 RX ADMIN — PROPOFOL 150 MCG/KG/MIN: 10 INJECTION, EMULSION INTRAVENOUS at 16:00

## 2022-10-05 RX ADMIN — SODIUM CHLORIDE, SODIUM LACTATE, POTASSIUM CHLORIDE, AND CALCIUM CHLORIDE: .6; .31; .03; .02 INJECTION, SOLUTION INTRAVENOUS at 15:55

## 2022-10-05 ASSESSMENT — LIFESTYLE VARIABLES: SMOKING_STATUS: 0

## 2022-10-05 NOTE — PLAN OF CARE
Reviewed post op instructions with patient and wife. Reviewed CXR which showed stable lead placement. D/c instructions on chart and appmts made.

## 2022-10-05 NOTE — DISCHARGE INSTRUCTIONS
[facility] office at:  [phone]  Dago Arguello RN      Permanent Pacemaker (PPM)   Implantable Cardioverter Defibrillator (ICD)  Care Instructions      Your permanent pacemaker/ICD is a sophisticated piece of electronic equipment and both the wound and the device need special care during your recovery period and into the future. Please use these instructions as guide. If you have any questions please call the office. Wound Care:   Keep the incision site clean and dry for one week. Do not get the incision or bandage wet. You may sponge bathe but DO NOT shower for the first seven days or until after your first follow up office visit. Do not remove the steri strips (the pieces of \"tape\" that cover the incision). These will eventually fall off on their own. DO NOT apply soaps, ointments,  lotion or powder to the incision site. Wear comfortable clothes that will not rub on the incision site. Avoid bra straps or suspenders. At your one week follow up appointment your bandage will be removed and you will be given further instruction on care of the site at that time. Your bra strap may lay directly over the incision. If it does - please do not wear the bra strap on the incisional side for 4 weeks to prevent irritation. When to contact the office:  Changes in how your incision site is healing including:  Increase in swelling and/or tenderness   Increase in redness at the incision site or surrounding the device  Drainage from the incision  If you experience:  Lightheadedness, dizziness or passing out  Increase in fatigue or shortness of breath  Fever (temperature greater than 100 degrees F)  Chills   Prolonged hiccups or chest discomfort  If you have any questions     Activity:   Do not raise your elbow above shoulder level or reach behind your back for 4 weeks after your procedure.   DO NOT lift more than 8 pounds with your affected arm for 4 weeks after your procedure. (A gallon of milk is 8 pounds). Avoid excessive pushing, pulling or twisting. DO NOT drive until instructed it is OK by your provider. Wear a sling only as a reminder to limit the activity of your affected arm. It is important to remember to still use your affected arm to maintain mobility but no excessive movements. No sports activities until approved by your provider. Precautions: You may use common household appliances, if they are in good repair (even microwaves). You should not lean against them while they are on. When using cellular and cordless phones, keep them at least 6 inches away from your device. (Use on the opposite side of your device.)  Do not hold or carry strong magnets. You may NOT perform welding. Airport and security screening devices should be avoided. You will need to show your ID card and ask for a hand search. Always tell your health care professional (including the dentist) that you have a device and show your ID card. ID Card: You will be provided a temporary ID card at the time of your hospital discharge and a permanent ID card in approximately 8 weeks. It is very important that you carry this ID card with you at ALL times. Consider purchasing a medical alert bracelet identifying your device and . Please refer to your pacemaker booklet for more information about your device. Follow Up Care: The pacemaker/ICD DOES NOT replace your current medications. It is important for you to continue your medications as prescribed. You will be given your first follow up appointment approximately one week after your procedure to check your wound and device. You will then have a follow up approximately one month after that appointment and then every 3-6 months thereafter. If you are unsure of your follow up appointment [address]     FOLLOW-UP APPOINTMENTS    Follow-up with device technician,  in 7-10 days for a wound and device check. Follow-up with NP at 1 month for a wound and device check. Follow-up with NP at 3 months for an appointment and device check. [address], [address] Phone: [phone]. If you are unable to make this appointment, please call to reschedule.

## 2022-10-05 NOTE — PROCEDURES
[facility]     Electrophysiology Procedure Note       Date of Procedure: 10/5/2022  Patient's Name: Frances Alexandre  YOB: 1960   Medical Record Number: [id number]  Procedure Performed by: Gregorio Mcdowell MD    Procedures performed:    Insertion of MRI compatible right ventricular defibrillator lead under fluoroscopy. Implantation of a MRI compatible Medtronic ICD. Electronic analysis of lead and device. Anesthesia: Local and Monitored Anesthesia Care  Mallampati airway assessment class: I  ASA class: 1  Estimated blood loss less than 20 cc      Indication of the procedure:    Frances Alexandre is a 59 y.o. male with non-ischemic cardiomyopathy, LVEF 28%, NYHA Class 2, QRS duration of less than 100 ms who has been on guideline-directed medical therapy for more than 3 months [Beta-blocker therapy and ACE-i/ARB therapy and with patient prognosis of life expectancy of > 1 year. Details of procedure: The patient was brought to the electrophysiology laboratory in stable condition. The patient was in a fasting and non-sedated state. The risks, benefits and alternatives of the procedure were discussed with the patient. The risks including, but not limited to, the risks of vascular injury, bleeding, infection, device malfunction, lead dislodgement, radiation exposure, injury to cardiac and surrounding structures (including pneumothorax), stroke, myocardial infarction and death were discussed in detail. The patient opted to proceed with the device implantation. Written informed consent was signed and placed in the chart. Prophylactic antibiotic using Ancef 2 g IV was given. The patient was prepped and draped in sterile fashion. A timeout protocol was completed to identify the patient and the procedure being performed. IV sedation was provided with IV Versed and IV Fentanyl.  The patient was monitored continuously with ECG, pulse oximetry, blood pressure monitoring, and direct observation. An incision was made in the left upper pectoral area in a transverse line roughly 1 cm from the clavicle after administration of lidocaine/bupivicaine combination. Using electrocautery and blunt dissection, a pocket was created. Central venous access into the left extrathoracic subclavian vein was obtained using the modified Seldinger technique. After central venous access was obtained, a sheath was placed in the axillary vein. A right ventricular lead was advanced into the RV apical septal position under fluoroscopic guidance and using a series of curved and straight stylets. The lead was actively fixated. After confirming appropriate function and no diaphragmatic stimulation at maximum output, the sheath was split and removed. The lead was secured to the underlying tissue using suture material.      A new sheath was advanced over a second previously placed wire into the vein. The atrial lead was advanced to the right atrial appendage and actively fixated under fluoroscopic guidance. After confirming appropriate function and no diaphragmatic stimulation at maximum output, the sheath was split and removed. The lead was secured to the underlying tissue using suture. The leads were then connected to the new ICD pulse generator which was then placed into the cleaned pocket. Copious amount (> 200 cc) of saline flush was used to irrigate the pocket. The pocket was then closed using a 2-0 Vicryl and 3-0 Vicryl subcutaneous layer and a subcuticular layer using 4-0 Vicryl. The skin was covered with pressure dressing. Defibrillation threshold testing was not performed. All sponge and needle counts were reported as correct at the end of the procedure. The patient tolerated the procedure well and there were no complications. Post-sedation evaluation was completed. Patient was transported to the holding area in stable condition.     Device and Leads information:              Impression:    Pre- and post-procedural diagnoses of  with successful implantation of a Medtronic ICD. Plan:     The patient will be observed for two hours and have usual post-implant care, including chest x-ray, and antibiotic therapy and interrogation of the device. If there are no complications, the patient will be discharged home with a 1-week wound check with our clinic nurse and a 1 month follow-up with EP NP. Thank you for allowing us to participate in the care of your patient. If you have any questions, please do not hesitate to contact me.     Aron Voss MD   Cardiac Electrophysiology  [address] [place] [address]  Office [phone]  [employer]

## 2022-10-05 NOTE — ANESTHESIA PRE PROCEDURE
Department of Anesthesiology  Preprocedure Note       Name:  Anirudh Patiño   Age:  59 y.o.  :  1960                                          MRN:  [id number]         Date:  10/5/2022      Surgeon: * Surgery not found *    Procedure:     Medications prior to admission:   Prior to Admission medications    Medication Sig Start Date End Date Taking? Authorizing Provider   furosemide (LASIX) 20 MG tablet Take 1 tablet by mouth daily 22   MYKEL Vergara - CNP   losartan (COZAAR) 50 MG tablet Take 1 tablet by mouth daily 22   Ainsley Acosta, APRN - CNP   allopurinol (ZYLOPRIM) 100 MG tablet TAKE 1 TABLET BY MOUTH EVERY DAY 22   Nathan Myrick MD   nitroGLYCERIN (NITROSTAT) 0.4 MG SL tablet up to max of 3 total doses. If no relief after 1 dose, call 911. 22   MYKEL Vergara - CNP   carvedilol (COREG) 12.5 MG tablet Take 1 tablet by mouth in the morning and 1 tablet before bedtime.  22   Lino Ewing MD   spironolactone (ALDACTONE) 25 MG tablet Take 0.5 tablets by mouth in the morning. 22   Lino Ewing MD   aspirin EC 81 MG EC tablet Take 1 tablet by mouth daily 22   Lino Ewing MD   rosuvastatin (CRESTOR) 20 MG tablet Take 1 tablet by mouth daily 22   Lino Ewing MD   empagliflozin (JARDIANCE) 10 MG tablet Take 1 tablet by mouth daily 22   Lino Ewing MD   clobetasol (TEMOVATE) 0.05 % ointment APPLY EXTERNALLY TO THE AFFECTED AREA TWICE DAILY 21   Nathan Myrick MD   magnesium 30 MG tablet Take 400 mg by mouth daily     Historical Provider, MD   Zinc Sulfate (ZINC 15 PO) Take by mouth    Historical Provider, MD   vitamin E 400 UNIT capsule Take 400 Units by mouth daily    Historical Provider, MD   Coenzyme Q10 (COQ10) 100 MG CAPS Take 4 tablets by mouth     Historical Provider, MD   Glucosamine-Chondroit-Vit C-Mn (GLUCOSAMINE 1500 COMPLEX PO) Take by mouth    Historical Provider, MD   Omega-3 Fatty Acids (FISH OIL) 1200 MG CAPS Take 2 capsules by mouth 3 tablets once a week     Historical Provider, MD   B Complex Vitamins (VITAMIN-B COMPLEX) TABS Take by mouth    Historical Provider, MD   vitamin D (CHOLECALCIFEROL) 400 UNIT TABS tablet Take 400 Units by mouth 2 times daily. 8/18/10   Historical Provider, MD       Current medications:    Current Outpatient Medications   Medication Sig Dispense Refill    furosemide (LASIX) 20 MG tablet Take 1 tablet by mouth daily 90 tablet 3    losartan (COZAAR) 50 MG tablet Take 1 tablet by mouth daily 90 tablet 3    allopurinol (ZYLOPRIM) 100 MG tablet TAKE 1 TABLET BY MOUTH EVERY DAY 90 tablet 1    nitroGLYCERIN (NITROSTAT) 0.4 MG SL tablet up to max of 3 total doses. If no relief after 1 dose, call 911. 25 tablet 3    carvedilol (COREG) 12.5 MG tablet Take 1 tablet by mouth in the morning and 1 tablet before bedtime. 180 tablet 3    spironolactone (ALDACTONE) 25 MG tablet Take 0.5 tablets by mouth in the morning. 45 tablet 3    aspirin EC 81 MG EC tablet Take 1 tablet by mouth daily 90 tablet 3    rosuvastatin (CRESTOR) 20 MG tablet Take 1 tablet by mouth daily 90 tablet 3    empagliflozin (JARDIANCE) 10 MG tablet Take 1 tablet by mouth daily 90 tablet 3    clobetasol (TEMOVATE) 0.05 % ointment APPLY EXTERNALLY TO THE AFFECTED AREA TWICE DAILY 60 g 2    magnesium 30 MG tablet Take 400 mg by mouth daily       Zinc Sulfate (ZINC 15 PO) Take by mouth      vitamin E 400 UNIT capsule Take 400 Units by mouth daily      Coenzyme Q10 (COQ10) 100 MG CAPS Take 4 tablets by mouth       Glucosamine-Chondroit-Vit C-Mn (GLUCOSAMINE 1500 COMPLEX PO) Take by mouth      Omega-3 Fatty Acids (FISH OIL) 1200 MG CAPS Take 2 capsules by mouth 3 tablets once a week       B Complex Vitamins (VITAMIN-B COMPLEX) TABS Take by mouth      vitamin D (CHOLECALCIFEROL) 400 UNIT TABS tablet Take 400 Units by mouth 2 times daily.        Current Facility-Administered Medications   Medication Dose Route Frequency Provider Last Rate Last Admin    iohexol (OMNIPAQUE 180) injection 1.94 mL  350 mg IntraVENous ONCE PRN Alecia Lindsay MD        ceFAZolin (ANCEF) 2,000 mg in sodium chloride 0.9 % 50 mL IVPB (mini-bag)  2,000 mg IntraVENous Once Alecia Lindsay MD           Allergies: Allergies   Allergen Reactions    Doxycycline Dermatitis    Grass Pollen(K-O-R-T-Swt Miles) Other (See Comments)    Hydrocodone Other (See Comments)     States that skin peels off    Iodides     Ketorolac Tromethamine Swelling    Naproxen Other (See Comments)     unknown    Norco [Hydrocodone-Acetaminophen] Dermatitis    Oxycodone-Acetaminophen Hives    Percocet [Oxycodone-Acetaminophen]      Skin falls off hands    Yeast-Related Products Other (See Comments)    Clindamycin/Lincomycin Rash     \"sunburn rash\" and impending feeling of gloom    Short Ragweed Pollen Ext Rash       Problem List:    Patient Active Problem List   Diagnosis Code    Sleep apnea G47.30    Hypertension, benign I10    Psoriasis L40.9    Well adult exam Z00.00    Thyroid nodule E04.1    Left shoulder pain M25.512    Primary osteoarthritis of left shoulder M19.012    Abnormal myocardial perfusion study R94.39    Pre-syncope R55    Chronic clinical systolic heart failure (HCC) I50.22    Chest pain R07.9    CHINO (acute kidney injury) (HCC) N17.9    Dilated cardiomyopathy (HCC) I42.0    Coronary artery disease involving native coronary artery of native heart without angina pectoris I25.10    Hyperlipidemia E78.5       Past Medical History:        Diagnosis Date    Arthritis     CHF (congestive heart failure) (HCC)     CHF (congestive heart failure) (HCC)     Hypertension, benign     Kidney stone     Lipoma     removal righy shoulder    Other drug allergy(995.27)     Psoriasis     pt states it is worse since being on Metoprolol    Thyroid disease     enlarged    Unspecified sleep apnea     no CPAP.  Has not done a sleep study       Past Surgical History:        Procedure Laterality Date    HERNIA REPAIR      right     JOINT REPLACEMENT  10/27/2017    left shoulder    LIPOMA RESECTION      OTHER SURGICAL HISTORY Left 10/27/2017    LEFT SHOULDER RESURFACTING HEMIARTHROPLASTY, OPEN BICEPS    TONSILLECTOMY         Social History:    Social History     Tobacco Use    Smoking status: Never    Smokeless tobacco: Never   Substance Use Topics    Alcohol use: Yes     Comment: occ                                Counseling given: Not Answered      Vital Signs (Current):   Vitals:    10/05/22 1127 10/05/22 1143   BP:  (!) 155/92   Pulse:  69   Resp:  18   Temp:  97.9 °F (36.6 °C)   TempSrc:  Oral   SpO2:  98%   Weight: 233 lb 9.6 oz (106 kg) 233 lb 9.6 oz (106 kg)   Height: 5' 8\" (1.727 m) 5' 8\" (1.727 m)                                              BP Readings from Last 3 Encounters:   10/05/22 (!) 155/92   09/20/22 118/70   08/26/22 134/72       NPO Status: Time of last liquid consumption: 2300                        Time of last solid consumption: 2300                                                      BMI:   Wt Readings from Last 3 Encounters:   10/05/22 233 lb 9.6 oz (106 kg)   09/20/22 226 lb (102.5 kg)   08/26/22 236 lb 3.2 oz (107.1 kg)     Body mass index is 35.52 kg/m².     CBC:   Lab Results   Component Value Date/Time    WBC 8.8 10/05/2022 12:09 PM    RBC 4.67 10/05/2022 12:09 PM    HGB 14.5 10/05/2022 12:09 PM    HCT 41.8 10/05/2022 12:09 PM    MCV 89.5 10/05/2022 12:09 PM    RDW 13.1 10/05/2022 12:09 PM     10/05/2022 12:09 PM       CMP:   Lab Results   Component Value Date/Time     10/05/2022 12:09 PM    K 4.5 10/05/2022 12:09 PM    K 4.2 06/30/2021 04:54 AM     10/05/2022 12:09 PM    CO2 27 10/05/2022 12:09 PM    BUN 16 10/05/2022 12:09 PM    CREATININE 1.1 10/05/2022 12:09 PM    GFRAA >60 10/05/2022 12:09 PM    GFRAA >60 03/04/2013 10:55 AM    AGRATIO 1.6 09/16/2022 11:16 AM    LABGLOM >60 10/05/2022 12:09 PM GLUCOSE 151 10/05/2022 12:09 PM    PROT 7.0 09/16/2022 11:16 AM    PROT 7.4 03/04/2013 10:55 AM    CALCIUM 9.0 10/05/2022 12:09 PM    BILITOT 0.7 09/16/2022 11:16 AM    ALKPHOS 114 09/16/2022 11:16 AM    AST 20 09/16/2022 11:16 AM    ALT 22 09/16/2022 11:16 AM       POC Tests: No results for input(s): POCGLU, POCNA, POCK, POCCL, POCBUN, POCHEMO, POCHCT in the last 72 hours.     Coags:   Lab Results   Component Value Date/Time    PROTIME 13.7 10/05/2022 12:09 PM    INR 1.06 10/05/2022 12:09 PM    APTT 24.1 10/10/2017 01:58 PM       HCG (If Applicable): No results found for: PREGTESTUR, PREGSERUM, HCG, HCGQUANT     ABGs: No results found for: PHART, PO2ART, IAR3YMF, DNA9QIG, BEART, X9WPAGLH     Type & Screen (If Applicable):  No results found for: LABABO, LABRH    Drug/Infectious Status (If Applicable):  No results found for: HIV, HEPCAB    COVID-19 Screening (If Applicable):   Lab Results   Component Value Date/Time    COVID19 NOT DETECTED 12/22/2020 02:09 PM           Anesthesia Evaluation  Patient summary reviewed and Nursing notes reviewed no history of anesthetic complications:   Airway: Mallampati: II  TM distance: >3 FB   Neck ROM: full  Mouth opening: > = 3 FB   Dental: normal exam   (+) caps      Pulmonary: breath sounds clear to auscultation  (+) sleep apnea:      (-) not a current smoker (never)                           Cardiovascular:  Exercise tolerance: good (>4 METS),   (+) hypertension: moderate, CAD: non-obstructive, CHF:,     (-) past MI    NYHA Classification: II  ECG reviewed  Rhythm: regular  Rate: normal  Echocardiogram reviewed         Beta Blocker:  Dose within 24 Hrs      ROS comment: Results for orders placed or performed during the hospital encounter of 01/08/19  -ECHO Complete 2D W Doppler W Color:        Result                      Value             Ref Range           Left Ventricular Eject*     30                                    LVEF MODALITY               ECHO Neuro/Psych:   Negative Neuro/Psych ROS              GI/Hepatic/Renal:        (-) GERD       Endo/Other: Negative Endo/Other ROS                    Abdominal:             Vascular: negative vascular ROS. Other Findings:           Anesthesia Plan      general     ASA 3       Induction: intravenous. MIPS: Prophylactic antiemetics administered. Anesthetic plan and risks discussed with spouse and patient. Plan discussed with CRNA.     Attending anesthesiologist reviewed and agrees with Preprocedure content                Yasmin Hassan DO   10/5/2022

## 2022-10-05 NOTE — INTERVAL H&P NOTE
Update History & Physical    The patient's History and Physical of September 20, by me was reviewed with the patient and I examined the patient. There was no change. The surgical site was confirmed by the patient and me. Plan: The risks, benefits, expected outcome, and alternative to the recommended procedure have been discussed with the patient. Patient understands and wants to proceed with the procedure.      Electronically signed by Marlen Parrish MD on 10/5/2022 at 3:35 PM

## 2022-10-10 PROBLEM — Z95.810 CARDIAC DEFIBRILLATOR IN SITU: Status: ACTIVE | Noted: 2022-10-10

## 2022-10-12 ENCOUNTER — NURSE ONLY (OUTPATIENT)
Dept: CARDIOLOGY CLINIC | Age: 62
End: 2022-10-12
Payer: COMMERCIAL

## 2022-10-12 ENCOUNTER — TELEPHONE (OUTPATIENT)
Dept: CARDIOLOGY CLINIC | Age: 62
End: 2022-10-12

## 2022-10-12 DIAGNOSIS — I50.22 CHRONIC CLINICAL SYSTOLIC HEART FAILURE (HCC): ICD-10-CM

## 2022-10-12 DIAGNOSIS — Z95.810 CARDIAC DEFIBRILLATOR IN SITU: Primary | ICD-10-CM

## 2022-10-12 DIAGNOSIS — I42.0 DILATED CARDIOMYOPATHY (HCC): ICD-10-CM

## 2022-10-12 PROCEDURE — 93282 PRGRMG EVAL IMPLANTABLE DFB: CPT | Performed by: INTERNAL MEDICINE

## 2022-10-12 PROCEDURE — 93290 INTERROG DEV EVAL ICPMS IP: CPT | Performed by: INTERNAL MEDICINE

## 2022-10-12 NOTE — TELEPHONE ENCOUNTER
Initial remote transmission/pairing received. Appears pt is now connected using Smart Phone  soco:  MONITOR STATUS  Transmission Received  MONITOR MODEL  Smart Phone or Tablet  EMAIL  [email or web address]  Verified: 12-Oct-2022 2:44 PM  PHONE  Patient must verify mobile phone number  MESSAGE PREFERENCES  Last update:   12-Oct-2022 2:43 PM  Email:   ON  Text Message:   OFF  Soco Notifications:   ON    Please be sure pt returns relay monitor to [employer] or office to be shipped back to company. Thanks!

## 2022-10-12 NOTE — PROGRESS NOTES
Patient comes in for a 1 week wound check and programming evaluation on their Medtronic [place] XT SC ICD. Home monitor shipped on 10/7/22. Discussed remote monitoring and frequencies, provided literature, and communications. Pt is considering the [employer] Heart roxi, will contact [place]. Outer dressing was removed prior to visit from left chest device site. Incision is well approximated, CDI, SS remain. Revied post op wound care and restrictions. Pt informed to call office if he develops fever, chills, increased swelling, redness or drainage from site. Pt voiced an understanding. All sensing and pacing parameters are within normal range. Battery life 13.8 years   <0.1%%. AT/AF burden 0%  . 3 per hour  0 shocks/monitored episodes  Patient remains on Carvedilol, Furosemide  Discussed shock plan and demonstrated shock tones. Corrected the time in the . Changed the EGM source to [facility] & 2. Turned pre arrhythmia EGM from continuous to 1 month. Turned on additional shock and carelink alerts. Please see interrogation for more detail. Optivol is initializing. Discussed office communication. Patient will follow up in 1 month in office. We will continue to monitor remotely.

## 2022-10-12 NOTE — TELEPHONE ENCOUNTER
Patient called requesting to speak with Device Tech regarding additional  questions regarding Device patient is trying to download roxi on his phone and has been instructed to get information from [facility] please advise

## 2022-10-26 NOTE — RESULT ENCOUNTER NOTE
Unremarkable device check.    Continue to monitor    Henrietta Viramontes MD   Cardiac Electrophysiology  [address] [facility]  Office [phone]

## 2022-11-08 NOTE — H&P (VIEW-ONLY)
[address]   Electrophysiology  Office Visit  Date: 11/14/2022    Chief Complaint   Patient presents with    Cardiomyopathy    Congestive Heart Failure     Cardiac HX: Lizzie Kuhn is a 57 y.o. man with a h/o HTN and, HLD, VERN, CAD, NICMP, EF 30% (echo, 2019), cardiac MRI (2/2019, [facility]), showed no scar, EF 40%, s/p LHC (6/2021, Dr. Shellie Huerta) showed single-vessel disease but collateralized RCA, cardiac MRI (7/2022, [facility]) showed an EF 28%, LGE consistent with scar on optimal GDMT, s/p single-chamber MDT ICD (10/5/2022, Dr. Owen Galarza). Interval History/HPI: Patient is here for follow-up for nonischemic cardiomyopathy and ICD management. Patient was implanted with a single-chamber MDT ICD on 10/5/2022. Approximately 2 weeks post device implant he started with peeling of his hands, a red nonpapular rash on his abdomen and small red nonraised rash under his arms bilaterally. He does have a history of psoriasis which is extensive on his shins and on the back of his hands that he states is due to the carvedilol and losartan. He does not follow with a dermatologist.  He also has a metallic taste in his mouth. He is concerned about a titanium allergy. [employer] Assistant is currently working on obtaining the testing needed to test for a titanium allergy. He apparently pulled off a scab on his incision last Friday and was placed on Keflex 500 mg twice a day. He did not start the antibiotic until yesterday. His incision today was reviewed with NP that saw him last week who felt that the opening was larger. There was purulent drainage on the old dressing. There is no erythema around the incision or the opening. He has not had a fever or chills. He does have chest pain at times for which he takes nitroglycerin. He does have some shortness of breath with exertion. He denies PND, orthopnea or lower extremity edema. Review of his device today shows 0.1% , no arrhythmias, other parameters stable.     Home medications: Current Outpatient Medications on File Prior to Visit   Medication Sig Dispense Refill    furosemide (LASIX) 20 MG tablet TAKE 1 TABLET BY MOUTH EVERY DAY 90 tablet 3    cephALEXin (KEFLEX) 500 MG capsule Take 1 capsule by mouth 2 times daily for 10 days 20 capsule 0    losartan (COZAAR) 50 MG tablet Take 1 tablet by mouth daily 90 tablet 3    allopurinol (ZYLOPRIM) 100 MG tablet TAKE 1 TABLET BY MOUTH EVERY DAY 90 tablet 1    nitroGLYCERIN (NITROSTAT) 0.4 MG SL tablet up to max of 3 total doses. If no relief after 1 dose, call 911. 25 tablet 3    carvedilol (COREG) 12.5 MG tablet Take 1 tablet by mouth in the morning and 1 tablet before bedtime. 180 tablet 3    spironolactone (ALDACTONE) 25 MG tablet Take 0.5 tablets by mouth in the morning. 45 tablet 3    aspirin EC 81 MG EC tablet Take 1 tablet by mouth daily 90 tablet 3    rosuvastatin (CRESTOR) 20 MG tablet Take 1 tablet by mouth daily 90 tablet 3    empagliflozin (JARDIANCE) 10 MG tablet Take 1 tablet by mouth daily 90 tablet 3    clobetasol (TEMOVATE) 0.05 % ointment APPLY EXTERNALLY TO THE AFFECTED AREA TWICE DAILY 60 g 2    magnesium 30 MG tablet Take 400 mg by mouth daily       Zinc Sulfate (ZINC 15 PO) Take by mouth      vitamin E 400 UNIT capsule Take 400 Units by mouth daily      Coenzyme Q10 (COQ10) 100 MG CAPS Take 4 tablets by mouth       Omega-3 Fatty Acids (FISH OIL) 1200 MG CAPS Take 2 capsules by mouth 3 tablets once a week       B Complex Vitamins (VITAMIN-B COMPLEX) TABS Take by mouth      vitamin D (CHOLECALCIFEROL) 400 UNIT TABS tablet Take 400 Units by mouth 2 times daily. No current facility-administered medications on file prior to visit.        Past Medical History:   Diagnosis Date    Arthritis     CHF (congestive heart failure) (HCC)     CHF (congestive heart failure) (HCC)     Hypertension, benign     Kidney stone     Lipoma     removal righy shoulder    Other drug allergy(995.27)     Psoriasis     pt states it is worse since being on Metoprolol    Thyroid disease     enlarged    Unspecified sleep apnea     no CPAP. Has not done a sleep study        Past Surgical History:   Procedure Laterality Date    HERNIA REPAIR      right     JOINT REPLACEMENT  10/27/2017    left shoulder    LIPOMA RESECTION      OTHER SURGICAL HISTORY Left 10/27/2017    LEFT SHOULDER RESURFACTING HEMIARTHROPLASTY, OPEN BICEPS    TONSILLECTOMY         Allergies   Allergen Reactions    Doxycycline Dermatitis    Grass Pollen(K-O-R-T-Swt Miles) Other (See Comments)    Hydrocodone Other (See Comments)     States that skin peels off    Iodides     Ketorolac Tromethamine Swelling    Naproxen Other (See Comments)     unknown    Norco [Hydrocodone-Acetaminophen] Dermatitis    Oxycodone-Acetaminophen Hives    Percocet [Oxycodone-Acetaminophen]      Skin falls off hands    Yeast-Related Products Other (See Comments)    Clindamycin/Lincomycin Rash     \"sunburn rash\" and impending feeling of gloom    Short Ragweed Pollen Ext Rash       Social History:  Reviewed. reports that he has never smoked. He has never used smokeless tobacco. He reports current alcohol use. He reports that he does not use drugs. Family History:  Reviewed. family history includes Other in his father and mother. Review of System:    Constitutional: No fevers, chills. Eyes: No visual changes or diplopia. No scleral icterus. ENT: No Headaches. No mouth sores or sore throat. Cardiovascular: No for chest pain, Yes for dyspnea on exertion, No for palpitations or No for loss of consciousness. No cough, hemoptysis, No for pleuritic pain, or phlebitis. Respiratory: No for cough or wheezing. No hematemesis. Gastrointestinal: No abdominal pain, blood in stools. Genitourinary: No dysuria, or hematuria. Musculoskeletal: No gait disturbance,    Integumentary: No rash or pruritis. Neurological: No headache, change in muscle strength, numbness or tingling.    Psychiatric: No anxiety, or depression. Endocrine: No temperature intolerance. No excessive thirst, fluid intake, or urination. Hem/Lymph: No abnormal bruising or bleeding, blood clots or swollen lymph nodes. Allergic/Immunologic: No nasal congestion or hives. Physical Examination:  Vitals:    11/14/22 1407   BP: 132/84   Pulse: 78         Wt Readings from Last 3 Encounters:   11/14/22 232 lb (105.2 kg)   10/05/22 233 lb 9.6 oz (106 kg)   09/20/22 226 lb (102.5 kg)       Constitutional: Oriented. No distress. Head: Normocephalic and atraumatic. Mouth/Throat: Oropharynx is clear and moist.   Eyes: Conjunctivae clear without jaunduice. PERRL. Neck: Neck supple. No rigidity. No JVD present. Cardiovascular: Normal rate, regular rhythm, S1&S2. Pulmonary/Chest: Bilateral respiratory sounds. No wheezes, No rhonchi. Abdominal: Soft. Bowel sounds present. No distension, No tenderness. Musculoskeletal: No tenderness. No edema    Lymphadenopathy: Has no cervical adenopathy. Neurological: Alert and oriented. Cranial nerve appears intact, No Gross deficit   Skin: Skin is warm and dry. No rash noted. Psychiatric: Has a normal mood, affect and behavior     Labs:  Reviewed. No results for input(s): NA, K, CL, CO2, PHOS, BUN, CREATININE, CA in the last 72 hours. Invalid input(s):  TSH  No results for input(s): WBC, HGB, HCT, MCV, PLT in the last 72 hours.   Lab Results   Component Value Date/Time    TROPONINI <0.01 09/16/2022 11:16 AM     Lab Results   Component Value Date/Time    BNP 11.0 02/16/2012 05:25 PM     Lab Results   Component Value Date/Time    PROTIME 13.7 10/05/2022 12:09 PM    PROTIME 11.1 10/10/2017 01:58 PM    PROTIME 12.0 08/02/2016 10:14 AM    INR 1.06 10/05/2022 12:09 PM    INR 0.98 10/10/2017 01:58 PM    INR 1.05 08/02/2016 10:14 AM     Lab Results   Component Value Date/Time    CHOL 146 09/16/2022 11:16 AM    HDL 41 09/16/2022 11:16 AM    HDL 39 03/09/2012 10:24 AM    TRIG 97 09/16/2022 11:16 AM       ECG: Personally reviewed: NSR, HR 82, , QRS 98, QTc 396    ECHO:  1/18/2019   Summary   Technically difficult examination. Left ventricular cavity size is normal. Normal left ventricular wall   thickness. Overall left ventricular systolic function appears reduced with an ejection   fraction of 30%. There is diffuse hypokinesis. Normal diastolic function. Trivial pulmonic regurgitation present. The aortic root is borderline dilated measuring 3.8cm    Stress Test: 6/28/2021  Summary    There is a moderate size, moderate intensity, mid to distal    inferior/inferolateral wall and apical wall mixed (but primarily    reversible)defect, consistent with ischemia. Severely dilated LV with moderately depressed LV systolic function. Left ventricular ejection fraction of 38 %. There is moderate diffuse hypokinesis, apex is akinetic. Overall findings represent a high risk scan. Dr Juan J Nolasco was notified. Cardiac Angiography:    Problem List:   Patient Active Problem List    Diagnosis Date Noted    Hyperlipidemia 07/27/2022    Chronic clinical systolic heart failure ([address] 08/16/2016    Abnormal myocardial perfusion study 08/09/2016    Pre-syncope 08/09/2016    Left shoulder pain 04/20/2016    Primary osteoarthritis of left shoulder 04/20/2016    Well adult exam 03/04/2013    Thyroid nodule 03/04/2013    Sleep apnea     Hypertension, benign     Psoriasis     [employer]IC [place] 10/10/2022    Coronary artery disease involving native coronary artery of native heart without angina pectoris 07/07/2021    Dilated cardiomyopathy ([address] 06/29/2021    CHINO (acute kidney injury) ([address]     Chest pain 06/27/2021        Assessment:   1. Nonischemic cardiomyopathy ([address]    2. Chronic systolic CHF (congestive heart failure) ([address]    3. ICD (implantable cardioverter-defibrillator), single, in situ    4. Infection involving implantable cardioverter-defibrillator (ICD), subsequent encounter    5.  Coronary artery disease involving native coronary artery of native heart without angina pectoris        Cardiac HX: Lizzie Kuhn is a 57 y.o. man with a h/o HTN and, HLD, VERN, CAD, NICMP, EF 30% (echo, 2019), cardiac [facility] ([facility] showed no scar, EF 40%, s/p LHC (6/2021, Dr. Shellie Huerta) showed single-vessel disease but collateralized RCA, cardiac MRI (7/2022, [facility]) showed an EF 28%, LGE consistent with scar on optimal GDMT, s/p single-chamber MDT ICD (10/5/2022, Dr. Owen Galarza). NICMP  - EF 28%  - NYHA class I/II  - QRS 98  - On losartan 50 mg daily, labetalol 12.5 mg twice a day, spironolactone 12.5 mg daily, Jardiance 10 mg daily  - S/p single ch MDT ICD (10/5/22)  - Review of device shows 0.1% , no arrhythmias, other parameters stable. - L chest incision with open area w/ purulent drainage on dressing  - On Keflex - started 11/13/2022 - ordered 11/11/22  - Will review with Dr. Owen Galarza  - Reviewed activity with patient  - Titanium allergy testing - waiting on [employer]tronic  - F/u in 1 week  - ECG ordered and results personally reviewed     CAD  - No s/s  - LHC  - On ASA, statin    EF of 28%  ARB/BB for systolic HF  ASA and Statin for CAD  No known AF  No Tobacco use. All questions and concerns were addressed to the patient/family. Alternatives to my treatment were discussed. The note was completed using EMR. Every effort was made to ensure accuracy; however, inadvertent computerized transcription errors may be present. Patient received education regarding their diagnosis, treatment and medications while in the office today. [facility]         I  have spent 40 minutes in care of the patient including direct face to face time, chart preparation, reviewing diagnostic testing, other provider notes and coordinating patient care.

## 2022-11-08 NOTE — PROGRESS NOTES
[facility]   Electrophysiology  Office Visit  Date: 11/14/2022    Chief Complaint   Patient presents with    Cardiomyopathy    Congestive Heart Failure     Cardiac HX: Mario Mathis is a 57 y.o. man with a h/o HTN and, HLD, VERN, CAD, NICMP, EF 30% (echo, 2019), cardiac MRI (2/2019, [facility]), showed no scar, EF 40%, s/p LHC (6/2021, Dr. Whit Phan) showed single-vessel disease but collateralized RCA, cardiac MRI (7/2022, [facility]) showed an EF 28%, LGE consistent with scar on optimal GDMT, s/p single-chamber MDT ICD (10/5/2022, Dr. Joyce Haddad). Interval History/HPI: Patient is here for follow-up for nonischemic cardiomyopathy and ICD management. Patient was implanted with a single-chamber MDT ICD on 10/5/2022. Approximately 2 weeks post device implant he started with peeling of his hands, a red nonpapular rash on his abdomen and small red nonraised rash under his arms bilaterally. He does have a history of psoriasis which is extensive on his shins and on the back of his hands that he states is due to the carvedilol and losartan. He does not follow with a dermatologist.  He also has a metallic taste in his mouth. He is concerned about a titanium allergy. [employer] is currently working on obtaining the testing needed to test for a titanium allergy. He apparently pulled off a scab on his incision last Friday and was placed on Keflex 500 mg twice a day. He did not start the antibiotic until yesterday. His incision today was reviewed with NP that saw him last week who felt that the opening was larger. There was purulent drainage on the old dressing. There is no erythema around the incision or the opening. He has not had a fever or chills. He does have chest pain at times for which he takes nitroglycerin. He does have some shortness of breath with exertion. He denies PND, orthopnea or lower extremity edema. Review of his device today shows 0.1% , no arrhythmias, other parameters stable.     Home medications: Current Outpatient Medications on File Prior to Visit   Medication Sig Dispense Refill    furosemide (LASIX) 20 MG tablet TAKE 1 TABLET BY MOUTH EVERY DAY 90 tablet 3    cephALEXin (KEFLEX) 500 MG capsule Take 1 capsule by mouth 2 times daily for 10 days 20 capsule 0    losartan (COZAAR) 50 MG tablet Take 1 tablet by mouth daily 90 tablet 3    allopurinol (ZYLOPRIM) 100 MG tablet TAKE 1 TABLET BY MOUTH EVERY DAY 90 tablet 1    nitroGLYCERIN (NITROSTAT) 0.4 MG SL tablet up to max of 3 total doses. If no relief after 1 dose, call 911. 25 tablet 3    carvedilol (COREG) 12.5 MG tablet Take 1 tablet by mouth in the morning and 1 tablet before bedtime. 180 tablet 3    spironolactone (ALDACTONE) 25 MG tablet Take 0.5 tablets by mouth in the morning. 45 tablet 3    aspirin EC 81 MG EC tablet Take 1 tablet by mouth daily 90 tablet 3    rosuvastatin (CRESTOR) 20 MG tablet Take 1 tablet by mouth daily 90 tablet 3    empagliflozin (JARDIANCE) 10 MG tablet Take 1 tablet by mouth daily 90 tablet 3    clobetasol (TEMOVATE) 0.05 % ointment APPLY EXTERNALLY TO THE AFFECTED AREA TWICE DAILY 60 g 2    magnesium 30 MG tablet Take 400 mg by mouth daily       Zinc Sulfate (ZINC 15 PO) Take by mouth      vitamin E 400 UNIT capsule Take 400 Units by mouth daily      Coenzyme Q10 (COQ10) 100 MG CAPS Take 4 tablets by mouth       Omega-3 Fatty Acids (FISH OIL) 1200 MG CAPS Take 2 capsules by mouth 3 tablets once a week       B Complex Vitamins (VITAMIN-B COMPLEX) TABS Take by mouth      vitamin D (CHOLECALCIFEROL) 400 UNIT TABS tablet Take 400 Units by mouth 2 times daily. No current facility-administered medications on file prior to visit.        Past Medical History:   Diagnosis Date    Arthritis     CHF (congestive heart failure) (HCC)     CHF (congestive heart failure) (HCC)     Hypertension, benign     Kidney stone     Lipoma     removal righy shoulder    Other drug allergy(995.27)     Psoriasis     pt states it is worse since being on Metoprolol    Thyroid disease     enlarged    Unspecified sleep apnea     no CPAP. Has not done a sleep study        Past Surgical History:   Procedure Laterality Date    HERNIA REPAIR      right     JOINT REPLACEMENT  10/27/2017    left shoulder    LIPOMA RESECTION      OTHER SURGICAL HISTORY Left 10/27/2017    LEFT SHOULDER RESURFACTING HEMIARTHROPLASTY, OPEN BICEPS    TONSILLECTOMY         Allergies   Allergen Reactions    Doxycycline Dermatitis    Grass Pollen(K-O-R-T-Swt Miles) Other (See Comments)    Hydrocodone Other (See Comments)     States that skin peels off    Iodides     Ketorolac Tromethamine Swelling    Naproxen Other (See Comments)     unknown    Norco [Hydrocodone-Acetaminophen] Dermatitis    Oxycodone-Acetaminophen Hives    Percocet [Oxycodone-Acetaminophen]      Skin falls off hands    Yeast-Related Products Other (See Comments)    Clindamycin/Lincomycin Rash     \"sunburn rash\" and impending feeling of gloom    Short Ragweed Pollen Ext Rash       Social History:  Reviewed. reports that he has never smoked. He has never used smokeless tobacco. He reports current alcohol use. He reports that he does not use drugs. Family History:  Reviewed. family history includes Other in his father and mother. Review of System:    Constitutional: No fevers, chills. Eyes: No visual changes or diplopia. No scleral icterus. ENT: No Headaches. No mouth sores or sore throat. Cardiovascular: No for chest pain, Yes for dyspnea on exertion, No for palpitations or No for loss of consciousness. No cough, hemoptysis, No for pleuritic pain, or phlebitis. Respiratory: No for cough or wheezing. No hematemesis. Gastrointestinal: No abdominal pain, blood in stools. Genitourinary: No dysuria, or hematuria. Musculoskeletal: No gait disturbance,    Integumentary: No rash or pruritis. Neurological: No headache, change in muscle strength, numbness or tingling.    Psychiatric: No anxiety, or depression. Endocrine: No temperature intolerance. No excessive thirst, fluid intake, or urination. Hem/Lymph: No abnormal bruising or bleeding, blood clots or swollen lymph nodes. Allergic/Immunologic: No nasal congestion or hives. Physical Examination:  Vitals:    11/14/22 1407   BP: 132/84   Pulse: 78         Wt Readings from Last 3 Encounters:   11/14/22 232 lb (105.2 kg)   10/05/22 233 lb 9.6 oz (106 kg)   09/20/22 226 lb (102.5 kg)       Constitutional: Oriented. No distress. Head: Normocephalic and atraumatic. Mouth/Throat: Oropharynx is clear and moist.   Eyes: Conjunctivae clear without jaunduice. PERRL. Neck: Neck supple. No rigidity. No JVD present. Cardiovascular: Normal rate, regular rhythm, S1&S2. Pulmonary/Chest: Bilateral respiratory sounds. No wheezes, No rhonchi. Abdominal: Soft. Bowel sounds present. No distension, No tenderness. Musculoskeletal: No tenderness. No edema    Lymphadenopathy: Has no cervical adenopathy. Neurological: Alert and oriented. Cranial nerve appears intact, No Gross deficit   Skin: Skin is warm and dry. No rash noted. Psychiatric: Has a normal mood, affect and behavior     Labs:  Reviewed. No results for input(s): NA, K, CL, CO2, PHOS, BUN, CREATININE, CA in the last 72 hours. Invalid input(s):  TSH  No results for input(s): WBC, HGB, HCT, MCV, PLT in the last 72 hours.   Lab Results   Component Value Date/Time    TROPONINI <0.01 09/16/2022 11:16 AM     Lab Results   Component Value Date/Time    BNP 11.0 02/16/2012 05:25 PM     Lab Results   Component Value Date/Time    PROTIME 13.7 10/05/2022 12:09 PM    PROTIME 11.1 10/10/2017 01:58 PM    PROTIME 12.0 08/02/2016 10:14 AM    INR 1.06 10/05/2022 12:09 PM    INR 0.98 10/10/2017 01:58 PM    INR 1.05 08/02/2016 10:14 AM     Lab Results   Component Value Date/Time    CHOL 146 09/16/2022 11:16 AM    HDL 41 09/16/2022 11:16 AM    HDL 39 03/09/2012 10:24 AM    TRIG 97 09/16/2022 11:16 AM       ECG: Personally reviewed: NSR, HR 82, , QRS 98, QTc 396    ECHO:  1/18/2019   Summary   Technically difficult examination. Left ventricular cavity size is normal. Normal left ventricular wall   thickness. Overall left ventricular systolic function appears reduced with an ejection   fraction of 30%. There is diffuse hypokinesis. Normal diastolic function. Trivial pulmonic regurgitation present. The aortic root is borderline dilated measuring 3.8cm    Stress Test: 6/28/2021  Summary    There is a moderate size, moderate intensity, mid to distal    inferior/inferolateral wall and apical wall mixed (but primarily    reversible)defect, consistent with ischemia. Severely dilated LV with moderately depressed LV systolic function. Left ventricular ejection fraction of 38 %. There is moderate diffuse hypokinesis, apex is akinetic. Overall findings represent a high risk scan. Dr Osorio Altamirano was notified. Cardiac Angiography:    Problem List:   Patient Active Problem List    Diagnosis Date Noted    Hyperlipidemia 07/27/2022    Chronic clinical systolic heart failure ([address] 08/16/2016    Abnormal myocardial perfusion study 08/09/2016    Pre-syncope 08/09/2016    Left shoulder pain 04/20/2016    Primary osteoarthritis of left shoulder 04/20/2016    Well adult exam 03/04/2013    Thyroid nodule 03/04/2013    Sleep apnea     Hypertension, benign     Psoriasis     [employer] [place] 10/10/2022    Coronary artery disease involving native coronary artery of native heart without angina pectoris 07/07/2021    Dilated cardiomyopathy ([address] 06/29/2021    CHINO (acute kidney injury) ([address]     Chest pain 06/27/2021        Assessment:   1. Nonischemic cardiomyopathy ([address]    2. Chronic systolic CHF (congestive heart failure) ([address]    3. ICD (implantable cardioverter-defibrillator), single, in situ    4. Infection involving implantable cardioverter-defibrillator (ICD), subsequent encounter    5.  Coronary artery disease involving native coronary artery of native heart without angina pectoris        Cardiac HX: Altagracia Haddad is a 57 y.o. man with a h/o HTN and, HLD, VERN, CAD, NICMP, EF 30% (echo, 2019), cardiac MRI (2/2019, [facility]), showed no scar, EF 40%, s/p LHC (6/2021, Dr. Denton Hewitt) showed single-vessel disease but collateralized RCA, cardiac MRI (7/2022, [facility]) showed an EF 28%, LGE consistent with scar on optimal GDMT, s/p single-chamber MDT ICD (10/5/2022, Dr. Kimmy Hale). NICMP  - EF 28%  - NYHA class I/II  - QRS 98  - On losartan 50 mg daily, labetalol 12.5 mg twice a day, spironolactone 12.5 mg daily, Jardiance 10 mg daily  - S/p single ch MDT ICD (10/5/22)  - Review of device shows 0.1% , no arrhythmias, other parameters stable. - L chest incision with open area w/ purulent drainage on dressing  - On Keflex - started 11/13/2022 - ordered 11/11/22  - Will review with Dr. Kimmy Hale  - Reviewed activity with patient  - Titanium allergy testing - waiting on [employer]  - F/u in 1 week  - ECG ordered and results personally reviewed     CAD  - No s/s  - LHC  - On ASA, statin    EF of 28%  ARB/BB for systolic HF  ASA and Statin for CAD  No known AF  No Tobacco use. All questions and concerns were addressed to the patient/family. Alternatives to my treatment were discussed. The note was completed using EMR. Every effort was made to ensure accuracy; however, inadvertent computerized transcription errors may be present. Patient received education regarding their diagnosis, treatment and medications while in the office today. Beth Grider CNP  [facility]         I  have spent 40 minutes in care of the patient including direct face to face time, chart preparation, reviewing diagnostic testing, other provider notes and coordinating patient care.

## 2022-11-10 ENCOUNTER — TELEPHONE (OUTPATIENT)
Dept: CARDIOLOGY CLINIC | Age: 62
End: 2022-11-10

## 2022-11-10 NOTE — TELEPHONE ENCOUNTER
I have been in clinic all day and apologize for delay response. Please contact patient and see if he can stop by tomorrow, [phone], for a RN to assess incision site as I will be in [address] [place] [address] covering Dr. Ashley Sumner clinic.      THanks

## 2022-11-10 NOTE — TELEPHONE ENCOUNTER
Patient had a device placed 10/05/2022    And is having a scab where the incision was, he has not touched this to aggravate this    And is bigger than it was previously,no redness but is larger    He was wondering if he could have this checked out,     He will be out this way if he could have someone take a     Look at this to make sure this is healing properly. Please advise.     #[phone]

## 2022-11-11 ENCOUNTER — NURSE ONLY (OUTPATIENT)
Dept: CARDIOLOGY CLINIC | Age: 62
End: 2022-11-11

## 2022-11-11 RX ORDER — CEPHALEXIN 500 MG/1
500 CAPSULE ORAL 2 TIMES DAILY
Qty: 20 CAPSULE | Refills: 0 | Status: SHIPPED | OUTPATIENT
Start: 2022-11-11 | End: 2022-11-21

## 2022-11-11 RX ORDER — FUROSEMIDE 20 MG/1
TABLET ORAL
Qty: 90 TABLET | Refills: 3 | Status: SHIPPED | OUTPATIENT
Start: 2022-11-11

## 2022-11-11 NOTE — TELEPHONE ENCOUNTER
Requested Prescriptions     Pending Prescriptions Disp Refills    furosemide (LASIX) 20 MG tablet [Pharmacy Med Name: FUROSEMIDE 20 MG TABLET] 90 tablet 3     Sig: TAKE 1 TABLET BY MOUTH EVERY DAY              Last Office Visit: 7/7/2021     Next Office Visit: [phone]        Last Labs: [phone]

## 2022-11-11 NOTE — PROGRESS NOTES
Patient presented to the office today for wound check. He had ICD placed in October 5, 2022 by Dr. Stefan Mao. At his 1 week follow-up appointment his incision was noted to be well approximated, CDI with remaining Steri-Strips. Patient states that once his Steri-Strips fell off he noticed a reddened area on the medial end of his incision. He states that he noted that it turned into a scab this week and it was getting bigger over the last few days and decided to come in for wound check. When assessing wound in office today, patient decided to remove scab on medial end of left chest incision. Area has small opening with scant amount of bloody and purulent drainage. Area cleansed and dressing applied. Patient denies any fevers, chills, tenderness, redness, or increased edema surrounding incision site. I have asked patient to monitor for signs and symptoms of infection and to start on Keflex 500 twice daily. I have asked him to keep the dressing on until his follow-up appointment on Monday and to hold off on showering until his wound is reassessed. He has his 4-week follow-up scheduled in the office on Monday and will reassess at that time. He has also been complaining of a rash on his abdomen and intermittently on his arms. States he is also had a feeling of tightness in his hands, along with a metallic taste in his mouth and dryness in his mucous membranes all of which started shortly after he had his device implanted. He is wondering if he is having allergic reaction to the device. He has been taking Benadryl with some relief of his symptoms. States he has been researching online and wants to know how to get tested for this. Advised patient that I would reach out to Dr. Stefan Mao regarding these things for further follow-up. Picture of wound uploaded under media tab. Sonya LARSON present during assessment.

## 2022-11-14 ENCOUNTER — NURSE ONLY (OUTPATIENT)
Dept: CARDIOLOGY CLINIC | Age: 62
End: 2022-11-14
Payer: COMMERCIAL

## 2022-11-14 ENCOUNTER — TELEPHONE (OUTPATIENT)
Dept: CARDIOLOGY | Age: 62
End: 2022-11-14

## 2022-11-14 ENCOUNTER — OFFICE VISIT (OUTPATIENT)
Dept: CARDIOLOGY CLINIC | Age: 62
End: 2022-11-14
Payer: COMMERCIAL

## 2022-11-14 VITALS
SYSTOLIC BLOOD PRESSURE: 132 MMHG | BODY MASS INDEX: 35.28 KG/M2 | HEART RATE: 78 BPM | WEIGHT: 232 LBS | DIASTOLIC BLOOD PRESSURE: 84 MMHG

## 2022-11-14 DIAGNOSIS — T82.7XXD INFECTION INVOLVING IMPLANTABLE CARDIOVERTER-DEFIBRILLATOR (ICD), SUBSEQUENT ENCOUNTER: ICD-10-CM

## 2022-11-14 DIAGNOSIS — Z95.810 CARDIAC DEFIBRILLATOR IN SITU: Primary | ICD-10-CM

## 2022-11-14 DIAGNOSIS — I42.0 DILATED CARDIOMYOPATHY (HCC): ICD-10-CM

## 2022-11-14 DIAGNOSIS — Z95.810 ICD (IMPLANTABLE CARDIOVERTER-DEFIBRILLATOR), SINGLE, IN SITU: ICD-10-CM

## 2022-11-14 DIAGNOSIS — I42.8 NONISCHEMIC CARDIOMYOPATHY (HCC): Primary | ICD-10-CM

## 2022-11-14 DIAGNOSIS — I50.22 CHRONIC SYSTOLIC CHF (CONGESTIVE HEART FAILURE) (HCC): ICD-10-CM

## 2022-11-14 DIAGNOSIS — I25.10 CORONARY ARTERY DISEASE INVOLVING NATIVE CORONARY ARTERY OF NATIVE HEART WITHOUT ANGINA PECTORIS: ICD-10-CM

## 2022-11-14 DIAGNOSIS — I50.22 CHRONIC CLINICAL SYSTOLIC HEART FAILURE (HCC): ICD-10-CM

## 2022-11-14 PROCEDURE — 93282 PRGRMG EVAL IMPLANTABLE DFB: CPT | Performed by: INTERNAL MEDICINE

## 2022-11-14 PROCEDURE — 3074F SYST BP LT 130 MM HG: CPT | Performed by: NURSE PRACTITIONER

## 2022-11-14 PROCEDURE — 99215 OFFICE O/P EST HI 40 MIN: CPT | Performed by: NURSE PRACTITIONER

## 2022-11-14 PROCEDURE — 3078F DIAST BP <80 MM HG: CPT | Performed by: NURSE PRACTITIONER

## 2022-11-14 NOTE — PROGRESS NOTES
Patient comes in for a 1 month wound and programming evaluation on their [employer]tronic Cobalt single chamber ICD. Home monitor is successfully communicating. Incision to be reassessed by NPFW. MDT is working with special request dept in regards to allergy testing/titanium reaction. UL/FW aware. Pt has been updated. All sensing and pacing parameters are within normal range. Battery life 13.7 years   0.1%. Since 10.12.2022:  0 shocks/monitored episodes. .2/hr  Patient remains on furosemide, carvedilol, asa 81 mg, mag. No changes need to be made at this time. Please see interrogation for more detail. [employer] is still initializing. Patient will see NPFW today and follow up in 3 months in office or remotely.

## 2022-11-14 NOTE — TELEPHONE ENCOUNTER
Reviewed patient with Dr. Owen Galarza. Incision reviewed. Patient with s/s of possible titanium allergy. He has a red rash on abd, peeling skin on his hands, a papular rash underneath his arms. At this point he felt the best recourse for the patient is to remove the device. Reviewed with patient who is agreeable. Will get this scheduled.

## 2022-11-15 ENCOUNTER — TELEPHONE (OUTPATIENT)
Dept: CARDIOLOGY CLINIC | Age: 62
End: 2022-11-15

## 2022-11-15 NOTE — TELEPHONE ENCOUNTER
Removal of Implantable Cardioverter Defibrillator      Date of Procedure:     Time of Arrival:    Cardiologist performing procedure: Dr. Delight Kehr at [facility] through the main entrance. Check in at the Outpatient Diagnostic desk on the 1st floor. Do not eat or drink anything after midnight the night before the procedure. You may brush your teeth and rinse the morning of the procedure. Take your routine medications the morning of the procedure. However, if you are taking diabetic medications, please HOLD on the day of the procedure (including insulin). If you take Lantus insulin, take HALF of your usual dose the night before. Do NOT stop taking aspirin. Please use Hibiclens soap (or any antibacterial soap such as Dial) to wash neck, chest, and abdomen the night before (or the morning of) the procedure. Do not apply any lotion, powder, or deodorant after you shower and the morning of the procedure. Please bring a list of your medications to the hospital with you. You must have someone available to drive you home the same (or next) day. If you are discharged the same day, you will need someone to stay with you at home the night of the procedure. If you are unable to make this appointment, please call [id number] [facility], at [phone].

## 2022-11-15 NOTE — TELEPHONE ENCOUNTER
Spoke with the patient. He is a former nurse. He stated he has done some research and believes he has a titanium allergy. He agreed to have the device removed and let it heal then look at further options down the road. We went over the pre-procedure instructions and he verbalized understanding. Procedure- ICD Removal  Date: 11/18 or 11/29  Arrival Time:  pm  Procedure Time: 1:00 pm or 2pm    Qgenda update & cath lab alerted.

## 2022-11-16 NOTE — TELEPHONE ENCOUNTER
[facility] for patient to return call that I was able to get him scheduled this week for his procedure with Dr Sima Moise. We went over all pre-procedure instructions on the call yesterday and he verbalized understanding them.      Procedure- ICD Explant  Date: 11/18/2022  Arrival Time: 11:00 am   Procedure Time: 1:00 pm     Qgenda update & cath lab alerted

## 2022-11-18 ENCOUNTER — PROCEDURE VISIT (OUTPATIENT)
Dept: CARDIOLOGY CLINIC | Age: 62
End: 2022-11-18

## 2022-11-18 ENCOUNTER — ANESTHESIA (OUTPATIENT)
Dept: CARDIAC CATH/INVASIVE PROCEDURES | Age: 62
End: 2022-11-18

## 2022-11-18 ENCOUNTER — HOSPITAL ENCOUNTER (OUTPATIENT)
Dept: CARDIAC CATH/INVASIVE PROCEDURES | Age: 62
Discharge: HOME | End: 2022-11-18
Payer: COMMERCIAL

## 2022-11-18 ENCOUNTER — ANESTHESIA EVENT (OUTPATIENT)
Dept: CARDIAC CATH/INVASIVE PROCEDURES | Age: 62
End: 2022-11-18

## 2022-11-18 VITALS
RESPIRATION RATE: 16 BRPM | OXYGEN SATURATION: 97 % | BODY MASS INDEX: 34.56 KG/M2 | HEART RATE: 68 BPM | TEMPERATURE: 98.1 F | HEIGHT: 68 IN | SYSTOLIC BLOOD PRESSURE: 131 MMHG | WEIGHT: 228 LBS | DIASTOLIC BLOOD PRESSURE: 82 MMHG

## 2022-11-18 DIAGNOSIS — Z95.810 CARDIAC DEFIBRILLATOR IN SITU: ICD-10-CM

## 2022-11-18 LAB
ANION GAP SERPL CALCULATED.3IONS-SCNC: 10 MMOL/L (ref 3–16)
BUN BLDV-MCNC: 20 MG/DL (ref 7–20)
CALCIUM SERPL-MCNC: 9.3 MG/DL (ref 8.3–10.6)
CHLORIDE BLD-SCNC: 101 MMOL/L (ref 99–110)
CO2: 24 MMOL/L (ref 21–32)
CREAT SERPL-MCNC: 1 MG/DL (ref 0.8–1.3)
GFR SERPL CREATININE-BSD FRML MDRD: >60 ML/MIN/{1.73_M2}
GLUCOSE BLD-MCNC: 202 MG/DL (ref 70–99)
HCT VFR BLD CALC: 47.6 % (ref 40.5–52.5)
HEMOGLOBIN: 16.6 G/DL (ref 13.5–17.5)
INR BLD: 1.15 (ref 0.87–1.14)
MCH RBC QN AUTO: 31 PG (ref 26–34)
MCHC RBC AUTO-ENTMCNC: 34.9 G/DL (ref 31–36)
MCV RBC AUTO: 88.8 FL (ref 80–100)
PDW BLD-RTO: 13 % (ref 12.4–15.4)
PLATELET # BLD: 154 K/UL (ref 135–450)
PMV BLD AUTO: 8.9 FL (ref 5–10.5)
POTASSIUM SERPL-SCNC: 4.4 MMOL/L (ref 3.5–5.1)
PROTHROMBIN TIME: 14.6 SEC (ref 11.7–14.5)
RBC # BLD: 5.36 M/UL (ref 4.2–5.9)
SODIUM BLD-SCNC: 135 MMOL/L (ref 136–145)
WBC # BLD: 8.9 K/UL (ref 4–11)

## 2022-11-18 PROCEDURE — 87070 CULTURE OTHR SPECIMN AEROBIC: CPT

## 2022-11-18 PROCEDURE — 2720000010 HC SURG SUPPLY STERILE

## 2022-11-18 PROCEDURE — 87077 CULTURE AEROBIC IDENTIFY: CPT

## 2022-11-18 PROCEDURE — 85610 PROTHROMBIN TIME: CPT

## 2022-11-18 PROCEDURE — 6360000002 HC RX W HCPCS: Performed by: NURSE ANESTHETIST, CERTIFIED REGISTERED

## 2022-11-18 PROCEDURE — 6360000002 HC RX W HCPCS: Performed by: INTERNAL MEDICINE

## 2022-11-18 PROCEDURE — 2709999900 HC NON-CHARGEABLE SUPPLY

## 2022-11-18 PROCEDURE — 2500000003 HC RX 250 WO HCPCS: Performed by: NURSE ANESTHETIST, CERTIFIED REGISTERED

## 2022-11-18 PROCEDURE — 93005 ELECTROCARDIOGRAM TRACING: CPT | Performed by: INTERNAL MEDICINE

## 2022-11-18 PROCEDURE — 87205 SMEAR GRAM STAIN: CPT

## 2022-11-18 PROCEDURE — 87075 CULTR BACTERIA EXCEPT BLOOD: CPT

## 2022-11-18 PROCEDURE — 85027 COMPLETE CBC AUTOMATED: CPT

## 2022-11-18 PROCEDURE — 6360000002 HC RX W HCPCS

## 2022-11-18 PROCEDURE — 2500000003 HC RX 250 WO HCPCS

## 2022-11-18 PROCEDURE — 80048 BASIC METABOLIC PNL TOTAL CA: CPT

## 2022-11-18 PROCEDURE — 2580000003 HC RX 258: Performed by: NURSE ANESTHETIST, CERTIFIED REGISTERED

## 2022-11-18 PROCEDURE — 3700000001 HC ADD 15 MINUTES (ANESTHESIA)

## 2022-11-18 PROCEDURE — 2580000003 HC RX 258: Performed by: INTERNAL MEDICINE

## 2022-11-18 PROCEDURE — 33241 REMOVE PULSE GENERATOR: CPT

## 2022-11-18 PROCEDURE — 33244 REMOVE ELCTRD TRANSVENOUSLY: CPT

## 2022-11-18 PROCEDURE — 3700000000 HC ANESTHESIA ATTENDED CARE

## 2022-11-18 RX ORDER — LIDOCAINE HYDROCHLORIDE 20 MG/ML
INJECTION, SOLUTION INTRAVENOUS PRN
Status: DISCONTINUED | OUTPATIENT
Start: 2022-11-18 | End: 2022-11-18 | Stop reason: SDUPTHER

## 2022-11-18 RX ORDER — PROPOFOL 10 MG/ML
INJECTION, EMULSION INTRAVENOUS CONTINUOUS PRN
Status: DISCONTINUED | OUTPATIENT
Start: 2022-11-18 | End: 2022-11-18 | Stop reason: SDUPTHER

## 2022-11-18 RX ORDER — SODIUM CHLORIDE, SODIUM LACTATE, POTASSIUM CHLORIDE, CALCIUM CHLORIDE 600; 310; 30; 20 MG/100ML; MG/100ML; MG/100ML; MG/100ML
INJECTION, SOLUTION INTRAVENOUS CONTINUOUS PRN
Status: DISCONTINUED | OUTPATIENT
Start: 2022-11-18 | End: 2022-11-18 | Stop reason: SDUPTHER

## 2022-11-18 RX ORDER — MIDAZOLAM HYDROCHLORIDE 1 MG/ML
INJECTION INTRAMUSCULAR; INTRAVENOUS PRN
Status: DISCONTINUED | OUTPATIENT
Start: 2022-11-18 | End: 2022-11-18 | Stop reason: SDUPTHER

## 2022-11-18 RX ORDER — LIDOCAINE HYDROCHLORIDE 20 MG/ML
INJECTION, SOLUTION INFILTRATION; PERINEURAL PRN
Status: DISCONTINUED | OUTPATIENT
Start: 2022-11-18 | End: 2022-11-18 | Stop reason: SDUPTHER

## 2022-11-18 RX ADMIN — SODIUM CHLORIDE, SODIUM LACTATE, POTASSIUM CHLORIDE, AND CALCIUM CHLORIDE: .6; .31; .03; .02 INJECTION, SOLUTION INTRAVENOUS at 15:06

## 2022-11-18 RX ADMIN — CEFAZOLIN 2000 MG: 2 INJECTION, POWDER, FOR SOLUTION INTRAMUSCULAR; INTRAVENOUS at 15:38

## 2022-11-18 RX ADMIN — PROPOFOL 100 MCG/KG/MIN: 10 INJECTION, EMULSION INTRAVENOUS at 15:47

## 2022-11-18 RX ADMIN — MIDAZOLAM HYDROCHLORIDE 2 MG: 2 INJECTION, SOLUTION INTRAMUSCULAR; INTRAVENOUS at 15:25

## 2022-11-18 RX ADMIN — LIDOCAINE HYDROCHLORIDE 50 MG: 20 INJECTION, SOLUTION INFILTRATION; PERINEURAL at 15:37

## 2022-11-18 NOTE — INTERVAL H&P NOTE
Update History & Physical    The patient's History and Physical of November 14, was reviewed with the patient and I examined the patient. Status post single-chamber ICD implantation for primary prevention of sudden cardiac arrest  He did well for the next 2 weeks  Started getting rashes all over the body, metallic taste of his oral cavity, tingling some numbing sensations in both extremities and peeling of skin from both palms, rashes and itching more on the axilla and gluteal creases and also on the abdominal cavity. Hence, I was concerned about allergic reaction to his device placement. Meanwhile, patient also noted there is a small scab on the medial side of the incision. Hence, he picked it by himself. In that spot seems like a through and through communication. Hence, I recommended complete device extraction. I also reached out to the [employer] medical affairs and technical support. They are sending a unused pacemaker so that we can perform allergic testing. If the patient is allergic to titanium, then her options include proceeding with a gold plated ICD implantation. This would take at least 8 weeks after a special order. Patient verbalized understanding. He is willing to proceed with his complete system extraction. Once the system was extracted, I also plan to send culture samples from his device pocket      The surgical site was confirmed by the patient and me. Plan: The risks, benefits, expected outcome, and alternative to the recommended procedure have been discussed with the patient. Patient understands and wants to proceed with the procedure.      Electronically signed by Shan Gaston MD on 11/18/2022 at 4:31 PM

## 2022-11-18 NOTE — ANESTHESIA PRE PROCEDURE
Department of Anesthesiology  Preprocedure Note       Name:  Thad Ellis   Age:  57 y.o.  :  1960                                          MRN:  [id number]         Date:  2022      Surgeon: * No surgeons listed *    Procedure: * No procedures listed *    Medications prior to admission:   Prior to Admission medications    Medication Sig Start Date End Date Taking? Authorizing Provider   furosemide (LASIX) 20 MG tablet TAKE 1 TABLET BY MOUTH EVERY DAY 22   Romina Parra MD   cephALEXin [facility]) 500 MG capsule Take 1 capsule by mouth 2 times daily for 10 days 22  MYKEL Auguste CNP   losartan (COZAAR) 50 MG tablet Take 1 tablet by mouth daily 22   MYKEL Escobedo CNP   allopurinol (ZYLOPRIM) 100 MG tablet TAKE 1 TABLET BY MOUTH EVERY DAY 22   Cynthia Phalen, MD   nitroGLYCERIN (NITROSTAT) 0.4 MG SL tablet up to max of 3 total doses. If no relief after 1 dose, call 911. 22   MYKEL Escobedo CNP   carvedilol (COREG) 12.5 MG tablet Take 1 tablet by mouth in the morning and 1 tablet before bedtime.  22   Romina Parra MD   spironolactone (ALDACTONE) 25 MG tablet Take 0.5 tablets by mouth in the morning. 22   Romina Parra MD   aspirin EC 81 MG EC tablet Take 1 tablet by mouth daily 22   Romina Parra MD   rosuvastatin (CRESTOR) 20 MG tablet Take 1 tablet by mouth daily 22   Romina Parra MD   empagliflozin (JARDIANCE) 10 MG tablet Take 1 tablet by mouth daily 22   Romina Parra MD   clobetasol (TEMOVATE) 0.05 % ointment APPLY EXTERNALLY TO THE AFFECTED AREA TWICE DAILY 21   Cynthia Phalen, MD   magnesium 30 MG tablet Take 400 mg by mouth daily     Historical Provider, MD   Zinc Sulfate (ZINC 15 PO) Take by mouth    Historical Provider, MD   vitamin E 400 UNIT capsule Take 400 Units by mouth daily    Historical Provider, MD   Coenzyme Q10 (COQ10) 100 MG CAPS Take 4 tablets by mouth     Historical Provider, MD Omega-3 Fatty Acids (FISH OIL) 1200 MG CAPS Take 2 capsules by mouth 3 tablets once a week     Historical Provider, MD   B Complex Vitamins (VITAMIN-B COMPLEX) TABS Take by mouth    Historical Provider, MD   vitamin D (CHOLECALCIFEROL) 400 UNIT TABS tablet Take 400 Units by mouth 2 times daily. 8/18/10   Historical Provider, MD       Current medications:    Current Outpatient Medications   Medication Sig Dispense Refill    furosemide (LASIX) 20 MG tablet TAKE 1 TABLET BY MOUTH EVERY DAY 90 tablet 3    cephALEXin (KEFLEX) 500 MG capsule Take 1 capsule by mouth 2 times daily for 10 days 20 capsule 0    losartan (COZAAR) 50 MG tablet Take 1 tablet by mouth daily 90 tablet 3    allopurinol (ZYLOPRIM) 100 MG tablet TAKE 1 TABLET BY MOUTH EVERY DAY 90 tablet 1    nitroGLYCERIN (NITROSTAT) 0.4 MG SL tablet up to max of 3 total doses. If no relief after 1 dose, call 911. 25 tablet 3    carvedilol (COREG) 12.5 MG tablet Take 1 tablet by mouth in the morning and 1 tablet before bedtime. 180 tablet 3    spironolactone (ALDACTONE) 25 MG tablet Take 0.5 tablets by mouth in the morning. 45 tablet 3    aspirin EC 81 MG EC tablet Take 1 tablet by mouth daily 90 tablet 3    rosuvastatin (CRESTOR) 20 MG tablet Take 1 tablet by mouth daily 90 tablet 3    empagliflozin (JARDIANCE) 10 MG tablet Take 1 tablet by mouth daily 90 tablet 3    clobetasol (TEMOVATE) 0.05 % ointment APPLY EXTERNALLY TO THE AFFECTED AREA TWICE DAILY 60 g 2    magnesium 30 MG tablet Take 400 mg by mouth daily       Zinc Sulfate (ZINC 15 PO) Take by mouth      vitamin E 400 UNIT capsule Take 400 Units by mouth daily      Coenzyme Q10 (COQ10) 100 MG CAPS Take 4 tablets by mouth       Omega-3 Fatty Acids (FISH OIL) 1200 MG CAPS Take 2 capsules by mouth 3 tablets once a week       B Complex Vitamins (VITAMIN-B COMPLEX) TABS Take by mouth      vitamin D (CHOLECALCIFEROL) 400 UNIT TABS tablet Take 400 Units by mouth 2 times daily.        Current Facility-Administered Medications   Medication Dose Route Frequency Provider Last Rate Last Admin    ceFAZolin (ANCEF) 2,000 mg in sodium chloride 0.9 % 50 mL IVPB (mini-bag)  2,000 mg IntraVENous Once Letitia Ram MD           Allergies:     Allergies   Allergen Reactions    Doxycycline Dermatitis    Grass Pollen(K-O-R-T-Swt Miles) Other (See Comments)    Hydrocodone Other (See Comments)     States that skin peels off    Iodides     Ketorolac Tromethamine Swelling    Naproxen Other (See Comments)     unknown    Norco [Hydrocodone-Acetaminophen] Dermatitis    Oxycodone-Acetaminophen Hives    Percocet [Oxycodone-Acetaminophen]      Skin falls off hands    Yeast-Related Products Other (See Comments)    Clindamycin/Lincomycin Rash     \"sunburn rash\" and impending feeling of gloom    Short Ragweed Pollen Ext Rash    Titanium Dioxide Dermatitis, Itching, Other (See Comments) and Rash       Problem List:    Patient Active Problem List   Diagnosis Code    Sleep apnea G47.30    Hypertension, benign I10    Psoriasis L40.9    Well adult exam Z00.00    Thyroid nodule E04.1    Left shoulder pain M25.512    Primary osteoarthritis of left shoulder M19.012    Abnormal myocardial perfusion study R94.39    Pre-syncope R55    Chronic clinical systolic heart failure (HCC) I50.22    Chest pain R07.9    CHINO (acute kidney injury) (HCC) N17.9    Dilated cardiomyopathy (HCC) I42.0    Coronary artery disease involving native coronary artery of native heart without angina pectoris I25.10    Hyperlipidemia E78.5    [employer] [place] Z95.810       Past Medical History:        Diagnosis Date    Arthritis     CHF (congestive heart failure) (HCC)     CHF (congestive heart failure) (HCC)     Hypertension, benign     Kidney stone     Lipoma     removal righy shoulder    Other drug allergy(995.27)     Psoriasis     pt states it is worse since being on Metoprolol    Thyroid disease     enlarged    Unspecified sleep apnea     no CPAP. Has not done a sleep study       Past Surgical History:        Procedure Laterality Date    HERNIA REPAIR      right     JOINT REPLACEMENT  10/27/2017    left shoulder    LIPOMA RESECTION      OTHER SURGICAL HISTORY Left 10/27/2017    LEFT SHOULDER RESURFACTING HEMIARTHROPLASTY, OPEN BICEPS    TONSILLECTOMY         Social History:    Social History     Tobacco Use    Smoking status: Never    Smokeless tobacco: Never   Substance Use Topics    Alcohol use: Yes     Comment: occ                                Counseling given: Not Answered      Vital Signs (Current):   Vitals:    11/18/22 1152   BP: 131/82   Pulse: 68   Resp: 16   Temp: 98.1 °F (36.7 °C)   TempSrc: Oral   SpO2: 97%   Weight: 228 lb (103.4 kg)   Height: 5' 8\" (1.727 m)                                              BP Readings from Last 3 Encounters:   11/18/22 131/82   11/14/22 132/84   10/05/22 (!) 155/92       NPO Status:                                                                                 BMI:   Wt Readings from Last 3 Encounters:   11/18/22 228 lb (103.4 kg)   11/14/22 232 lb (105.2 kg)   10/05/22 233 lb 9.6 oz (106 kg)     Body mass index is 34.67 kg/m².     CBC:   Lab Results   Component Value Date/Time    WBC 8.9 11/18/2022 11:52 AM    RBC 5.36 11/18/2022 11:52 AM    HGB 16.6 11/18/2022 11:52 AM    HCT 47.6 11/18/2022 11:52 AM    MCV 88.8 11/18/2022 11:52 AM    RDW 13.0 11/18/2022 11:52 AM     11/18/2022 11:52 AM       CMP:   Lab Results   Component Value Date/Time     10/05/2022 12:09 PM    K 4.5 10/05/2022 12:09 PM    K 4.2 06/30/2021 04:54 AM     10/05/2022 12:09 PM    CO2 27 10/05/2022 12:09 PM    BUN 16 10/05/2022 12:09 PM    CREATININE 1.1 10/05/2022 12:09 PM    GFRAA >60 10/05/2022 12:09 PM    GFRAA >60 03/04/2013 10:55 AM    AGRATIO 1.6 09/16/2022 11:16 AM    LABGLOM >60 10/05/2022 12:09 PM    GLUCOSE 151 10/05/2022 12:09 PM    PROT 7.0 09/16/2022 11:16 AM    PROT 7.4 03/04/2013 10:55 AM    CALCIUM 9.0 10/05/2022 12:09 PM    BILITOT 0.7 09/16/2022 11:16 AM    ALKPHOS 114 09/16/2022 11:16 AM    AST 20 09/16/2022 11:16 AM    ALT 22 09/16/2022 11:16 AM       POC Tests: No results for input(s): POCGLU, POCNA, POCK, POCCL, POCBUN, POCHEMO, POCHCT in the last 72 hours. Coags:   Lab Results   Component Value Date/Time    PROTIME 13.7 10/05/2022 12:09 PM    INR 1.06 10/05/2022 12:09 PM    APTT 24.1 10/10/2017 01:58 PM       HCG (If Applicable): No results found for: PREGTESTUR, PREGSERUM, HCG, HCGQUANT     ABGs: No results found for: PHART, PO2ART, AHG0AMH, FGS9NWU, BEART, N9VSEHDN     Type & Screen (If Applicable):  No results found for: LABABO, LABRH    Drug/Infectious Status (If Applicable):  No results found for: HIV, HEPCAB    COVID-19 Screening (If Applicable):   Lab Results   Component Value Date/Time    COVID19 NOT DETECTED 12/22/2020 02:09 PM           Anesthesia Evaluation  Patient summary reviewed and Nursing notes reviewed no history of anesthetic complications:   Airway: Mallampati: II  TM distance: >3 FB   Neck ROM: full  Mouth opening: > = 3 FB   Dental:          Pulmonary:   (+) sleep apnea:                             Cardiovascular:    (+) hypertension:, pacemaker: AICD, CAD:, CHF:,                   Neuro/Psych:   Negative Neuro/Psych ROS              GI/Hepatic/Renal: Neg GI/Hepatic/Renal ROS            Endo/Other: Negative Endo/Other ROS                    Abdominal:             Vascular: negative vascular ROS. Other Findings:           Anesthesia Plan      general     ASA 1    (68-year-old male presents for removal of implanted internal cardiac defibrillator. Plan general anesthesia with ASA standard monitors. Questions answered. Patient agreeable with anesthetic plan.  )  Induction: intravenous. Anesthetic plan and risks discussed with patient. Plan discussed with CRNA.     Attending anesthesiologist reviewed and agrees with Preprocedure content                Sanam Silverman MD   11/18/2022

## 2022-11-21 LAB
CULTURE CATHETER TIP: ABNORMAL
EKG ATRIAL RATE: 69 BPM
EKG DIAGNOSIS: NORMAL
EKG P AXIS: 37 DEGREES
EKG P-R INTERVAL: 164 MS
EKG Q-T INTERVAL: 406 MS
EKG QRS DURATION: 96 MS
EKG QTC CALCULATION (BAZETT): 435 MS
EKG R AXIS: 5 DEGREES
EKG T AXIS: 238 DEGREES
EKG VENTRICULAR RATE: 69 BPM
ORGANISM: ABNORMAL

## 2022-11-21 PROCEDURE — 93010 ELECTROCARDIOGRAM REPORT: CPT | Performed by: INTERNAL MEDICINE

## 2022-11-22 ENCOUNTER — TELEPHONE (OUTPATIENT)
Dept: CARDIOLOGY CLINIC | Age: 62
End: 2022-11-22

## 2022-11-22 NOTE — TELEPHONE ENCOUNTER
Regarding: Allergy testing   ----- Message from Cecilia Peralta sent at 11/21/2022 12:01 PM EST -----       ----- Message from Nick Miller to Yvette Warren MD sent at 11/21/2022 11:28 AM -----   Good afternoon. I've had the weekend to think about the allergy testing I have been told I must have. Is it [employer] that is responsible for the decision to have me undergo allergy testing for titanium before I can get a new device? If it is not [employer] then whom? Why do they insist I do so? As I understand it, that device was to prevent me having an arrhythmia that might lead to my death. So, while I have no device, my risk of death is greater. It would seem then that whomever is responsible for placing me at greater risk has also placed themselves in a position to be, at least partially, culpable should I die as a result of not having that device. That being the case then I am wondering why my need for such a device is being treated in such a cavalier manner. So I am thinking perhaps I should discuss this situation with an . What do you think? Thanks, have a good day.

## 2022-11-23 LAB
ANAEROBIC CULTURE: ABNORMAL
GRAM STAIN RESULT: ABNORMAL
ORGANISM: ABNORMAL
WOUND/ABSCESS: ABNORMAL
WOUND/ABSCESS: ABNORMAL

## 2022-11-25 ENCOUNTER — NURSE ONLY (OUTPATIENT)
Dept: CARDIOLOGY CLINIC | Age: 62
End: 2022-11-25

## 2022-11-25 NOTE — PROGRESS NOTES
Removed dressing from L upper chest incision. Wound is CDI without redness, swelling, or oozing. Advised pt on wound care. Pt verbalized understanding. Pt has appointment with allergist the beginning of Dec. Gave pt titanium allergy testing kit from [facility] to take with him to the visit with allergist. Made f/u with [facility] the beginning of 1/2023.

## 2022-11-28 PROBLEM — Z95.810 CARDIAC DEFIBRILLATOR IN SITU: Status: RESOLVED | Noted: 2022-10-10 | Resolved: 2022-11-28

## 2022-11-29 RX ORDER — SPIRONOLACTONE 25 MG/1
TABLET ORAL
Qty: 90 TABLET | Refills: 3 | Status: SHIPPED | OUTPATIENT
Start: 2022-11-29

## 2022-11-29 NOTE — TELEPHONE ENCOUNTER
Requested Prescriptions     Pending Prescriptions Disp Refills    spironolactone (ALDACTONE) 25 MG tablet [Pharmacy Med Name: SPIRONOLACTONE 25 MG TABLET] 90 tablet 3     Sig: TAKE 1 TABLET BY MOUTH EVERY DAY            Last Office Visit: [phone]    Next Office [id number]:[phone]

## 2022-12-06 ENCOUNTER — TELEPHONE (OUTPATIENT)
Dept: CARDIOLOGY CLINIC | Age: 62
End: 2022-12-06

## 2022-12-06 NOTE — TELEPHONE ENCOUNTER
Patient returned [facility] call pertaining to making an appointment today to come in and look at his wound.   Patient wants a call back at [phone]

## 2022-12-07 ENCOUNTER — NURSE ONLY (OUTPATIENT)
Dept: CARDIOLOGY CLINIC | Age: 62
End: 2022-12-07

## 2022-12-07 NOTE — PROGRESS NOTES
Pt asked to come in for a wound check as he feels there is \"a rectangular shape under his skin\" at the area of device removal. Directly at the incision site, there is soft, bulbous tissue (same size of the incision) that feels as if it may be scar/fibrous tissue. Discussed with [facility] who also feels this is scar tissue. Offered pt a CXR; however, he respectfully declined. He is yet to see an allergist as his appt got cancelled by the provider. He is going to r/s today.

## 2022-12-20 ENCOUNTER — TELEPHONE (OUTPATIENT)
Dept: CARDIOLOGY CLINIC | Age: 62
End: 2022-12-20

## 2022-12-22 RX ORDER — NITROGLYCERIN 0.4 MG/1
TABLET SUBLINGUAL
Qty: 75 TABLET | Refills: 1 | Status: SHIPPED | OUTPATIENT
Start: 2022-12-22

## 2022-12-22 NOTE — TELEPHONE ENCOUNTER
Requested Prescriptions     Pending Prescriptions Disp Refills    nitroGLYCERIN (NITROSTAT) 0.4 MG SL tablet [Pharmacy Med Name: NITROGLYCERIN 0.4 MG TABLET SL] 75 tablet 1     Sig: TAKE 1 TAB BY MOUTH AS NEEDED, MAY REPEAT UP TO 2 TIMES IF NO RELIEF.  IF NO RELIEF AFTER ONE [id number]            Last Office Visit: 11/18/2022     Next Office Visit: 1/5/2023

## 2023-01-06 DIAGNOSIS — E78.2 MIXED HYPERLIPIDEMIA: ICD-10-CM

## 2023-01-06 DIAGNOSIS — E78.2 MIXED HYPERLIPIDEMIA: Primary | ICD-10-CM

## 2023-01-06 LAB
ALBUMIN SERPL-MCNC: 4.5 G/DL (ref 3.4–5)
ALP BLD-CCNC: 135 U/L (ref 40–129)
ALT SERPL-CCNC: 38 U/L (ref 10–40)
AST SERPL-CCNC: 27 U/L (ref 15–37)
BILIRUB SERPL-MCNC: 0.3 MG/DL (ref 0–1)
BILIRUBIN DIRECT: <0.2 MG/DL (ref 0–0.3)
BILIRUBIN, INDIRECT: ABNORMAL MG/DL (ref 0–1)
TOTAL PROTEIN: 7.5 G/DL (ref 6.4–8.2)

## 2023-01-06 RX ORDER — PREDNISONE 20 MG/1
20 TABLET ORAL DAILY
Qty: 5 TABLET | Refills: 0 | Status: SHIPPED | OUTPATIENT
Start: 2023-01-06 | End: 2023-01-11

## 2023-02-05 DIAGNOSIS — I10 ESSENTIAL (PRIMARY) HYPERTENSION: ICD-10-CM

## 2023-02-05 DIAGNOSIS — I50.22 CHRONIC SYSTOLIC (CONGESTIVE) HEART FAILURE (HCC): ICD-10-CM

## 2023-02-06 RX ORDER — LOSARTAN POTASSIUM 50 MG/1
TABLET ORAL
Qty: 180 TABLET | Refills: 1 | Status: SHIPPED | OUTPATIENT
Start: 2023-02-06

## 2023-02-13 RX ORDER — NITROGLYCERIN 0.4 MG/1
TABLET SUBLINGUAL
Qty: 25 TABLET | Refills: 1 | Status: SHIPPED | OUTPATIENT
Start: 2023-02-13

## 2023-02-13 NOTE — TELEPHONE ENCOUNTER
Requested Prescriptions     Pending Prescriptions Disp Refills    nitroGLYCERIN (NITROSTAT) 0.4 MG SL tablet [Pharmacy Med Name: NITROGLYCERIN 0.4 MG TABLET SL] 25 tablet 1     Sig: TAKE 1 TAB BY MOUTH AS NEEDED, MAY REPEAT UP TO 2 TIMES IF NO RELIEF.  IF NO RELIEF AFTER ONE [id number]          Last Office Visit: 11/18/2022     Next Office Visit: Visit date not found

## 2023-03-08 ENCOUNTER — TELEPHONE (OUTPATIENT)
Dept: CARDIOLOGY | Age: 63
End: 2023-03-08

## 2023-03-08 NOTE — TELEPHONE ENCOUNTER
Patient sent a communication via Epic to my staff (please see message sent 3/7/23 at 2:02pm) where he attributes his medical conditions including CAD, MI, heart failure to the medical therapy he has received so far from his cardiologist (myself) according to the [facility] guidelines. He thus appears to have lost trust and gloria in his therapy and blames his cardiologist for his maladies. He is also upset for not being able to have 17 labs and studies he wanted ordered by his cardiologist, per email sent earlier that day (such labs and studies are currently not indicated per my opinion). Under these circumstances, patient-physician relationship cannot be maintained in good gloria and in trust and therefore, I will ask patient to seek another cardiologist to manage his cardiac conditions. My staff and I will be available for any questions or concerns in the meantime.

## 2023-06-06 RX ORDER — ASPIRIN 81 MG/1
TABLET, COATED ORAL
Qty: 90 TABLET | Refills: 3 | Status: SHIPPED | OUTPATIENT
Start: 2023-06-06

## 2023-07-14 RX ORDER — NITROGLYCERIN 0.4 MG/1
TABLET SUBLINGUAL
Qty: 25 TABLET | Refills: 1 | Status: SHIPPED | OUTPATIENT
Start: 2023-07-14

## 2023-07-24 RX ORDER — EMPAGLIFLOZIN 10 MG/1
TABLET, FILM COATED ORAL
Qty: 90 TABLET | Refills: 3 | Status: SHIPPED | OUTPATIENT
Start: 2023-07-24

## 2023-10-13 NOTE — TELEPHONE ENCOUNTER
Rashardm to return call and schedule yeraly follow up with either Dr. Sukh Thayer or Je Morning NP for future medication refills

## 2023-10-13 NOTE — TELEPHONE ENCOUNTER
Requested Prescriptions     Pending Prescriptions Disp Refills    spironolactone (ALDACTONE) 25 MG tablet [Pharmacy Med Name: SPIRONOLACTONE 25 MG TABLET] 15 tablet 0     Sig: TAKE 0.5 TABLETS BY MOUTH IN THE MORNING.             Last Office Visit: 11/18/2022     Next Office Visit: Visit date not found         Last Labs: 01.06.2023

## 2023-10-16 RX ORDER — SPIRONOLACTONE 25 MG/1
TABLET ORAL
Qty: 15 TABLET | Refills: 0 | Status: SHIPPED | OUTPATIENT
Start: 2023-10-16

## 2023-12-07 RX ORDER — NITROGLYCERIN 0.4 MG/1
TABLET SUBLINGUAL
Qty: 25 TABLET | Refills: 1 | OUTPATIENT
Start: 2023-12-07

## 2024-01-30 ENCOUNTER — TELEPHONE (OUTPATIENT)
Dept: INTERNAL MEDICINE CLINIC | Age: 64
End: 2024-01-30

## 2024-01-30 NOTE — TELEPHONE ENCOUNTER
Disha from [facility] is asking for verbal orders for the pt for skilled nursing and physical therapy. Pt is being discharged from  today. Pt has not been seen by Dr. Gay since 4/23/21.    Please call and confirm with Disha at [phone]